# Patient Record
Sex: FEMALE | Race: WHITE | NOT HISPANIC OR LATINO | Employment: UNEMPLOYED | URBAN - METROPOLITAN AREA
[De-identification: names, ages, dates, MRNs, and addresses within clinical notes are randomized per-mention and may not be internally consistent; named-entity substitution may affect disease eponyms.]

---

## 2017-01-03 ENCOUNTER — COMMUNICATION - HEALTHEAST (OUTPATIENT)
Dept: OTOLARYNGOLOGY | Facility: CLINIC | Age: 57
End: 2017-01-03

## 2017-01-04 ENCOUNTER — COMMUNICATION - HEALTHEAST (OUTPATIENT)
Dept: ADMINISTRATIVE | Facility: CLINIC | Age: 57
End: 2017-01-04

## 2017-02-08 ENCOUNTER — OFFICE VISIT - HEALTHEAST (OUTPATIENT)
Dept: INTERNAL MEDICINE | Facility: CLINIC | Age: 57
End: 2017-02-08

## 2017-02-08 DIAGNOSIS — I10 ESSENTIAL HYPERTENSION: ICD-10-CM

## 2017-02-17 ENCOUNTER — COMMUNICATION - HEALTHEAST (OUTPATIENT)
Dept: INTERNAL MEDICINE | Facility: CLINIC | Age: 57
End: 2017-02-17

## 2017-02-17 DIAGNOSIS — I10 HTN (HYPERTENSION): ICD-10-CM

## 2017-03-08 ENCOUNTER — COMMUNICATION - HEALTHEAST (OUTPATIENT)
Dept: SCHEDULING | Facility: CLINIC | Age: 57
End: 2017-03-08

## 2017-03-08 DIAGNOSIS — I10 HTN (HYPERTENSION): ICD-10-CM

## 2017-03-10 ENCOUNTER — AMBULATORY - HEALTHEAST (OUTPATIENT)
Dept: LAB | Facility: CLINIC | Age: 57
End: 2017-03-10

## 2017-03-10 ENCOUNTER — COMMUNICATION - HEALTHEAST (OUTPATIENT)
Dept: INTERNAL MEDICINE | Facility: CLINIC | Age: 57
End: 2017-03-10

## 2017-03-10 DIAGNOSIS — I10 HTN (HYPERTENSION): ICD-10-CM

## 2017-04-19 ENCOUNTER — COMMUNICATION - HEALTHEAST (OUTPATIENT)
Dept: INTERNAL MEDICINE | Facility: CLINIC | Age: 57
End: 2017-04-19

## 2017-04-19 ENCOUNTER — RECORDS - HEALTHEAST (OUTPATIENT)
Dept: ADMINISTRATIVE | Facility: OTHER | Age: 57
End: 2017-04-19

## 2017-04-19 ENCOUNTER — OFFICE VISIT - HEALTHEAST (OUTPATIENT)
Dept: INTERNAL MEDICINE | Facility: CLINIC | Age: 57
End: 2017-04-19

## 2017-04-19 DIAGNOSIS — I10 HTN (HYPERTENSION): ICD-10-CM

## 2017-06-05 ENCOUNTER — AMBULATORY - HEALTHEAST (OUTPATIENT)
Dept: NURSING | Facility: CLINIC | Age: 57
End: 2017-06-05

## 2017-06-12 ENCOUNTER — COMMUNICATION - HEALTHEAST (OUTPATIENT)
Dept: SCHEDULING | Facility: CLINIC | Age: 57
End: 2017-06-12

## 2017-07-11 ENCOUNTER — OFFICE VISIT - HEALTHEAST (OUTPATIENT)
Dept: INTERNAL MEDICINE | Facility: CLINIC | Age: 57
End: 2017-07-11

## 2017-07-11 DIAGNOSIS — I10 ESSENTIAL HYPERTENSION WITH GOAL BLOOD PRESSURE LESS THAN 140/90: ICD-10-CM

## 2017-07-11 DIAGNOSIS — E78.00 PURE HYPERCHOLESTEROLEMIA: ICD-10-CM

## 2017-07-11 DIAGNOSIS — I10 HTN (HYPERTENSION): ICD-10-CM

## 2017-08-01 ENCOUNTER — COMMUNICATION - HEALTHEAST (OUTPATIENT)
Dept: INTERNAL MEDICINE | Facility: CLINIC | Age: 57
End: 2017-08-01

## 2017-10-10 ENCOUNTER — RECORDS - HEALTHEAST (OUTPATIENT)
Dept: ADMINISTRATIVE | Facility: OTHER | Age: 57
End: 2017-10-10

## 2017-11-20 ENCOUNTER — AMBULATORY - HEALTHEAST (OUTPATIENT)
Dept: NURSING | Facility: CLINIC | Age: 57
End: 2017-11-20

## 2018-01-08 ENCOUNTER — TRANSFERRED RECORDS (OUTPATIENT)
Dept: HEALTH INFORMATION MANAGEMENT | Facility: CLINIC | Age: 58
End: 2018-01-08
Payer: COMMERCIAL

## 2018-01-09 ENCOUNTER — RECORDS - HEALTHEAST (OUTPATIENT)
Dept: ADMINISTRATIVE | Facility: OTHER | Age: 58
End: 2018-01-09

## 2018-01-10 ENCOUNTER — OFFICE VISIT - HEALTHEAST (OUTPATIENT)
Dept: INTERNAL MEDICINE | Facility: CLINIC | Age: 58
End: 2018-01-10

## 2018-01-10 DIAGNOSIS — I10 ESSENTIAL HYPERTENSION: ICD-10-CM

## 2018-01-10 DIAGNOSIS — E78.00 PURE HYPERCHOLESTEROLEMIA: ICD-10-CM

## 2018-01-10 DIAGNOSIS — J34.9 SINUS DISEASE: ICD-10-CM

## 2018-01-10 DIAGNOSIS — Z12.11 SCREENING FOR COLON CANCER: ICD-10-CM

## 2018-01-10 DIAGNOSIS — G47.33 OBSTRUCTIVE SLEEP APNEA: ICD-10-CM

## 2018-01-10 LAB
ALBUMIN SERPL-MCNC: 3.9 G/DL (ref 3.5–5)
ALP SERPL-CCNC: 85 U/L (ref 45–120)
ALT SERPL W P-5'-P-CCNC: 16 U/L (ref 0–45)
ANION GAP SERPL CALCULATED.3IONS-SCNC: 11 MMOL/L (ref 5–18)
AST SERPL W P-5'-P-CCNC: 12 U/L (ref 0–40)
BILIRUB SERPL-MCNC: 0.6 MG/DL (ref 0–1)
BUN SERPL-MCNC: 15 MG/DL (ref 8–22)
CALCIUM SERPL-MCNC: 9.9 MG/DL (ref 8.5–10.5)
CHLORIDE BLD-SCNC: 106 MMOL/L (ref 98–107)
CHOLEST SERPL-MCNC: 240 MG/DL
CO2 SERPL-SCNC: 27 MMOL/L (ref 22–31)
CREAT SERPL-MCNC: 0.74 MG/DL (ref 0.6–1.1)
FASTING STATUS PATIENT QL REPORTED: YES
GFR SERPL CREATININE-BSD FRML MDRD: >60 ML/MIN/1.73M2
GLUCOSE BLD-MCNC: 76 MG/DL (ref 70–125)
HDLC SERPL-MCNC: 57 MG/DL
LDLC SERPL CALC-MCNC: 163 MG/DL
POTASSIUM BLD-SCNC: 4.7 MMOL/L (ref 3.5–5)
PROT SERPL-MCNC: 7.9 G/DL (ref 6–8)
SODIUM SERPL-SCNC: 144 MMOL/L (ref 136–145)
TRIGL SERPL-MCNC: 101 MG/DL

## 2018-01-23 ENCOUNTER — COMMUNICATION - HEALTHEAST (OUTPATIENT)
Dept: INTERNAL MEDICINE | Facility: CLINIC | Age: 58
End: 2018-01-23

## 2018-02-02 ENCOUNTER — COMMUNICATION - HEALTHEAST (OUTPATIENT)
Dept: SCHEDULING | Facility: CLINIC | Age: 58
End: 2018-02-02

## 2018-02-06 ENCOUNTER — OFFICE VISIT - HEALTHEAST (OUTPATIENT)
Dept: INTERNAL MEDICINE | Facility: CLINIC | Age: 58
End: 2018-02-06

## 2018-02-06 ENCOUNTER — RECORDS - HEALTHEAST (OUTPATIENT)
Dept: GENERAL RADIOLOGY | Facility: CLINIC | Age: 58
End: 2018-02-06

## 2018-02-06 DIAGNOSIS — R05.9 COUGH: ICD-10-CM

## 2018-02-06 DIAGNOSIS — G47.33 OBSTRUCTIVE SLEEP APNEA: ICD-10-CM

## 2018-02-06 DIAGNOSIS — E78.00 PURE HYPERCHOLESTEROLEMIA: ICD-10-CM

## 2018-02-06 DIAGNOSIS — Z11.59 NEED FOR HEPATITIS C SCREENING TEST: ICD-10-CM

## 2018-02-06 DIAGNOSIS — I10 ESSENTIAL HYPERTENSION: ICD-10-CM

## 2018-02-06 DIAGNOSIS — Z01.810 PREOP CARDIOVASCULAR EXAM: ICD-10-CM

## 2018-02-06 LAB
ANION GAP SERPL CALCULATED.3IONS-SCNC: 9 MMOL/L (ref 5–18)
ATRIAL RATE - MUSE: 77 BPM
BUN SERPL-MCNC: 9 MG/DL (ref 8–22)
CALCIUM SERPL-MCNC: 9.6 MG/DL (ref 8.5–10.5)
CHLORIDE BLD-SCNC: 104 MMOL/L (ref 98–107)
CO2 SERPL-SCNC: 28 MMOL/L (ref 22–31)
CREAT SERPL-MCNC: 0.74 MG/DL (ref 0.6–1.1)
DIASTOLIC BLOOD PRESSURE - MUSE: NORMAL MMHG
ERYTHROCYTE [DISTWIDTH] IN BLOOD BY AUTOMATED COUNT: 12 % (ref 11–14.5)
GFR SERPL CREATININE-BSD FRML MDRD: >60 ML/MIN/1.73M2
GLUCOSE BLD-MCNC: 89 MG/DL (ref 70–125)
HCT VFR BLD AUTO: 41.3 % (ref 35–47)
HGB BLD-MCNC: 13.7 G/DL (ref 12–16)
INR PPP: 1.03 (ref 0.9–1.1)
INTERPRETATION ECG - MUSE: NORMAL
MCH RBC QN AUTO: 28.8 PG (ref 27–34)
MCHC RBC AUTO-ENTMCNC: 33.3 G/DL (ref 32–36)
MCV RBC AUTO: 86 FL (ref 80–100)
P AXIS - MUSE: 25 DEGREES
PLATELET # BLD AUTO: 474 THOU/UL (ref 140–440)
PMV BLD AUTO: 6.8 FL (ref 7–10)
POTASSIUM BLD-SCNC: 4 MMOL/L (ref 3.5–5)
PR INTERVAL - MUSE: 140 MS
QRS DURATION - MUSE: 70 MS
QT - MUSE: 378 MS
QTC - MUSE: 427 MS
R AXIS - MUSE: 45 DEGREES
RBC # BLD AUTO: 4.77 MILL/UL (ref 3.8–5.4)
SODIUM SERPL-SCNC: 141 MMOL/L (ref 136–145)
SYSTOLIC BLOOD PRESSURE - MUSE: NORMAL MMHG
T AXIS - MUSE: 17 DEGREES
VENTRICULAR RATE- MUSE: 77 BPM
WBC: 8.4 THOU/UL (ref 4–11)

## 2018-02-06 ASSESSMENT — MIFFLIN-ST. JEOR: SCORE: 1371.4

## 2018-02-07 ENCOUNTER — RECORDS - HEALTHEAST (OUTPATIENT)
Dept: ADMINISTRATIVE | Facility: OTHER | Age: 58
End: 2018-02-07

## 2018-02-07 LAB
HCV AB SERPL QL IA: NEGATIVE
PAP SMEAR - HIM PATIENT REPORTED: NORMAL

## 2018-02-08 ENCOUNTER — COMMUNICATION - HEALTHEAST (OUTPATIENT)
Dept: SCHEDULING | Facility: CLINIC | Age: 58
End: 2018-02-08

## 2018-02-21 ENCOUNTER — TRANSFERRED RECORDS (OUTPATIENT)
Dept: HEALTH INFORMATION MANAGEMENT | Facility: CLINIC | Age: 58
End: 2018-02-21
Payer: COMMERCIAL

## 2018-03-05 ENCOUNTER — RECORDS - HEALTHEAST (OUTPATIENT)
Dept: ADMINISTRATIVE | Facility: OTHER | Age: 58
End: 2018-03-05

## 2018-04-02 ENCOUNTER — RECORDS - HEALTHEAST (OUTPATIENT)
Dept: ADMINISTRATIVE | Facility: OTHER | Age: 58
End: 2018-04-02

## 2018-05-08 ENCOUNTER — COMMUNICATION - HEALTHEAST (OUTPATIENT)
Dept: INTERNAL MEDICINE | Facility: CLINIC | Age: 58
End: 2018-05-08

## 2018-05-08 DIAGNOSIS — I10 HTN (HYPERTENSION): ICD-10-CM

## 2018-06-13 ENCOUNTER — OFFICE VISIT - HEALTHEAST (OUTPATIENT)
Dept: INTERNAL MEDICINE | Facility: CLINIC | Age: 58
End: 2018-06-13

## 2018-06-13 DIAGNOSIS — I10 ESSENTIAL HYPERTENSION: ICD-10-CM

## 2018-06-13 DIAGNOSIS — L65.9 HAIR LOSS: ICD-10-CM

## 2018-06-13 LAB — TSH SERPL DL<=0.005 MIU/L-ACNC: 1 UIU/ML (ref 0.3–5)

## 2018-06-17 LAB — DHEA SERPL-MCNC: 1.63 NG/ML (ref 0.63–4.7)

## 2018-08-07 ENCOUNTER — COMMUNICATION - HEALTHEAST (OUTPATIENT)
Dept: INTERNAL MEDICINE | Facility: CLINIC | Age: 58
End: 2018-08-07

## 2018-08-07 DIAGNOSIS — I10 ESSENTIAL HYPERTENSION WITH GOAL BLOOD PRESSURE LESS THAN 140/90: ICD-10-CM

## 2018-10-31 ENCOUNTER — RECORDS - HEALTHEAST (OUTPATIENT)
Dept: ADMINISTRATIVE | Facility: OTHER | Age: 58
End: 2018-10-31

## 2018-11-05 ENCOUNTER — AMBULATORY - HEALTHEAST (OUTPATIENT)
Dept: NURSING | Facility: CLINIC | Age: 58
End: 2018-11-05

## 2019-02-02 ENCOUNTER — COMMUNICATION - HEALTHEAST (OUTPATIENT)
Dept: INTERNAL MEDICINE | Facility: CLINIC | Age: 59
End: 2019-02-02

## 2019-02-02 DIAGNOSIS — I10 HTN (HYPERTENSION): ICD-10-CM

## 2019-02-26 ENCOUNTER — AMBULATORY - HEALTHEAST (OUTPATIENT)
Dept: INTERNAL MEDICINE | Facility: CLINIC | Age: 59
End: 2019-02-26

## 2019-02-26 ENCOUNTER — OFFICE VISIT - HEALTHEAST (OUTPATIENT)
Dept: INTERNAL MEDICINE | Facility: CLINIC | Age: 59
End: 2019-02-26

## 2019-02-26 DIAGNOSIS — E78.00 PURE HYPERCHOLESTEROLEMIA: ICD-10-CM

## 2019-02-26 DIAGNOSIS — I10 HTN (HYPERTENSION): ICD-10-CM

## 2019-02-26 DIAGNOSIS — I10 ESSENTIAL HYPERTENSION WITH GOAL BLOOD PRESSURE LESS THAN 140/90: ICD-10-CM

## 2019-02-26 LAB
ERYTHROCYTE [DISTWIDTH] IN BLOOD BY AUTOMATED COUNT: 12.8 % (ref 11–14.5)
HCT VFR BLD AUTO: 40.2 % (ref 35–47)
HGB BLD-MCNC: 13.7 G/DL (ref 12–16)
MCH RBC QN AUTO: 29.3 PG (ref 27–34)
MCHC RBC AUTO-ENTMCNC: 34 G/DL (ref 32–36)
MCV RBC AUTO: 86 FL (ref 80–100)
PLATELET # BLD AUTO: 290 THOU/UL (ref 140–440)
PMV BLD AUTO: 7.2 FL (ref 7–10)
RBC # BLD AUTO: 4.66 MILL/UL (ref 3.8–5.4)
WBC: 7 THOU/UL (ref 4–11)

## 2019-02-27 LAB
ALBUMIN SERPL-MCNC: 4.1 G/DL (ref 3.5–5)
ALP SERPL-CCNC: 88 U/L (ref 45–120)
ALT SERPL W P-5'-P-CCNC: 14 U/L (ref 0–45)
ANION GAP SERPL CALCULATED.3IONS-SCNC: 14 MMOL/L (ref 5–18)
AST SERPL W P-5'-P-CCNC: 15 U/L (ref 0–40)
BILIRUB SERPL-MCNC: 0.5 MG/DL (ref 0–1)
BUN SERPL-MCNC: 11 MG/DL (ref 8–22)
CALCIUM SERPL-MCNC: 9.8 MG/DL (ref 8.5–10.5)
CHLORIDE BLD-SCNC: 105 MMOL/L (ref 98–107)
CHOLEST SERPL-MCNC: 234 MG/DL
CO2 SERPL-SCNC: 23 MMOL/L (ref 22–31)
CREAT SERPL-MCNC: 0.74 MG/DL (ref 0.6–1.1)
FASTING STATUS PATIENT QL REPORTED: YES
GFR SERPL CREATININE-BSD FRML MDRD: >60 ML/MIN/1.73M2
GLUCOSE BLD-MCNC: 90 MG/DL (ref 70–125)
HDLC SERPL-MCNC: 61 MG/DL
LDLC SERPL CALC-MCNC: 149 MG/DL
POTASSIUM BLD-SCNC: 4 MMOL/L (ref 3.5–5)
PROT SERPL-MCNC: 7.5 G/DL (ref 6–8)
SODIUM SERPL-SCNC: 142 MMOL/L (ref 136–145)
TRIGL SERPL-MCNC: 121 MG/DL
TSH SERPL DL<=0.005 MIU/L-ACNC: 1.49 UIU/ML (ref 0.3–5)

## 2019-03-19 ENCOUNTER — RECORDS - HEALTHEAST (OUTPATIENT)
Dept: ADMINISTRATIVE | Facility: OTHER | Age: 59
End: 2019-03-19

## 2019-04-09 ENCOUNTER — TRANSFERRED RECORDS (OUTPATIENT)
Dept: HEALTH INFORMATION MANAGEMENT | Facility: CLINIC | Age: 59
End: 2019-04-09
Payer: COMMERCIAL

## 2019-04-09 ENCOUNTER — RECORDS - HEALTHEAST (OUTPATIENT)
Dept: ADMINISTRATIVE | Facility: OTHER | Age: 59
End: 2019-04-09

## 2019-04-09 LAB
HPV_EXT - HISTORICAL: NORMAL
PAP SMEAR - HIM PATIENT REPORTED: NORMAL

## 2019-06-10 ENCOUNTER — COMMUNICATION - HEALTHEAST (OUTPATIENT)
Dept: INTERNAL MEDICINE | Facility: CLINIC | Age: 59
End: 2019-06-10

## 2019-06-10 DIAGNOSIS — R05.9 COUGH: ICD-10-CM

## 2019-06-10 DIAGNOSIS — J34.9 SINUS DISEASE: ICD-10-CM

## 2019-06-11 ENCOUNTER — RECORDS - HEALTHEAST (OUTPATIENT)
Dept: ADMINISTRATIVE | Facility: OTHER | Age: 59
End: 2019-06-11

## 2019-06-24 ENCOUNTER — RECORDS - HEALTHEAST (OUTPATIENT)
Dept: HEALTH INFORMATION MANAGEMENT | Facility: CLINIC | Age: 59
End: 2019-06-24

## 2019-07-16 ENCOUNTER — OFFICE VISIT - HEALTHEAST (OUTPATIENT)
Dept: INTERNAL MEDICINE | Facility: CLINIC | Age: 59
End: 2019-07-16

## 2019-07-16 DIAGNOSIS — G47.33 OBSTRUCTIVE SLEEP APNEA: ICD-10-CM

## 2019-07-16 DIAGNOSIS — I10 ESSENTIAL HYPERTENSION WITH GOAL BLOOD PRESSURE LESS THAN 140/90: ICD-10-CM

## 2019-07-16 DIAGNOSIS — E78.00 PURE HYPERCHOLESTEROLEMIA: ICD-10-CM
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
## 2019-07-16 DIAGNOSIS — R05.3 CHRONIC COUGH: ICD-10-CM

## 2019-07-16 DIAGNOSIS — S30.861A TICK BITE OF ABDOMEN, INITIAL ENCOUNTER: ICD-10-CM

## 2019-07-16 ASSESSMENT — MIFFLIN-ST. JEOR: SCORE: 1434.05

## 2019-07-17 ENCOUNTER — COMMUNICATION - HEALTHEAST (OUTPATIENT)
Dept: INTERNAL MEDICINE | Facility: CLINIC | Age: 59
End: 2019-07-17

## 2019-07-17 LAB — B BURGDOR IGG+IGM SER QL: 0.09 INDEX VALUE

## 2019-07-29 ENCOUNTER — COMMUNICATION - HEALTHEAST (OUTPATIENT)
Dept: INTERNAL MEDICINE | Facility: CLINIC | Age: 59
End: 2019-07-29

## 2019-07-29 DIAGNOSIS — I10 HTN (HYPERTENSION): ICD-10-CM

## 2019-07-30 ENCOUNTER — HOSPITAL ENCOUNTER (OUTPATIENT)
Dept: CT IMAGING | Facility: CLINIC | Age: 59
Discharge: HOME | End: 2019-07-30
Attending: INTERNAL MEDICINE

## 2019-07-30 ENCOUNTER — HOSPITAL ENCOUNTER (OUTPATIENT)
Dept: RESPIRATORY THERAPY | Facility: CLINIC | Age: 59
Discharge: HOME | End: 2019-07-30
Attending: INTERNAL MEDICINE

## 2019-07-30 DIAGNOSIS — R05.3 CHRONIC COUGH: ICD-10-CM

## 2019-07-30 LAB — HGB BLD-MCNC: 14 G/DL (ref 12–16)

## 2019-08-01 ENCOUNTER — COMMUNICATION - HEALTHEAST (OUTPATIENT)
Dept: INTERNAL MEDICINE | Facility: CLINIC | Age: 59
End: 2019-08-01

## 2019-08-27 ENCOUNTER — MEDICAL CORRESPONDENCE (OUTPATIENT)
Dept: HEALTH INFORMATION MANAGEMENT | Facility: CLINIC | Age: 59
End: 2019-08-27

## 2019-08-27 ENCOUNTER — COMMUNICATION - HEALTHEAST (OUTPATIENT)
Dept: INTERNAL MEDICINE | Facility: CLINIC | Age: 59
End: 2019-08-27

## 2019-08-27 ENCOUNTER — OFFICE VISIT - HEALTHEAST (OUTPATIENT)
Dept: INTERNAL MEDICINE | Facility: CLINIC | Age: 59
End: 2019-08-27

## 2019-08-27 DIAGNOSIS — J30.89 NON-SEASONAL ALLERGIC RHINITIS DUE TO OTHER ALLERGIC TRIGGER: ICD-10-CM

## 2019-08-27 DIAGNOSIS — I25.10 ASYMPTOMATIC ARTERIOSCLEROSIS OF CORONARY ARTERY: ICD-10-CM

## 2019-08-27 DIAGNOSIS — I10 ESSENTIAL HYPERTENSION: ICD-10-CM

## 2019-08-27 DIAGNOSIS — J47.9 BRONCHIECTASIS WITHOUT ACUTE EXACERBATION (H): ICD-10-CM

## 2019-08-27 DIAGNOSIS — J34.9 SINUS DISEASE: ICD-10-CM

## 2019-08-27 DIAGNOSIS — R05.3 CHRONIC COUGH: ICD-10-CM

## 2019-08-27 ASSESSMENT — MIFFLIN-ST. JEOR: SCORE: 1447.66

## 2019-08-29 ENCOUNTER — COMMUNICATION - HEALTHEAST (OUTPATIENT)
Dept: INTERNAL MEDICINE | Facility: CLINIC | Age: 59
End: 2019-08-29

## 2019-08-29 DIAGNOSIS — J34.9 SINUS DISEASE: ICD-10-CM

## 2019-09-20 ENCOUNTER — OFFICE VISIT - HEALTHEAST (OUTPATIENT)
Dept: PULMONOLOGY | Facility: OTHER | Age: 59
End: 2019-09-20

## 2019-09-20 DIAGNOSIS — T78.40XA ALLERGIC STATE, INITIAL ENCOUNTER: ICD-10-CM

## 2019-09-20 DIAGNOSIS — R05.3 CHRONIC COUGH: ICD-10-CM

## 2019-09-20 DIAGNOSIS — J47.9 BRONCHIECTASIS WITHOUT ACUTE EXACERBATION (H): ICD-10-CM

## 2019-09-20 DIAGNOSIS — J45.30 MILD PERSISTENT ASTHMA WITHOUT COMPLICATION: ICD-10-CM

## 2019-09-20 ASSESSMENT — MIFFLIN-ST. JEOR: SCORE: 1431.78

## 2019-10-07 ENCOUNTER — OFFICE VISIT - HEALTHEAST (OUTPATIENT)
Dept: ALLERGY | Facility: CLINIC | Age: 59
End: 2019-10-07

## 2019-10-07 DIAGNOSIS — J30.1 SEASONAL ALLERGIC RHINITIS DUE TO POLLEN: ICD-10-CM

## 2019-10-07 DIAGNOSIS — J45.30 MILD PERSISTENT ASTHMA WITHOUT COMPLICATION: ICD-10-CM

## 2019-10-07 ASSESSMENT — MIFFLIN-ST. JEOR: SCORE: 1452.65

## 2019-10-29 ENCOUNTER — COMMUNICATION - HEALTHEAST (OUTPATIENT)
Dept: INTERNAL MEDICINE | Facility: CLINIC | Age: 59
End: 2019-10-29

## 2019-10-29 DIAGNOSIS — I25.10 CORONARY ATHEROSCLEROSIS OF NATIVE CORONARY ARTERY: Primary | ICD-10-CM

## 2019-10-29 PROCEDURE — 36415 COLL VENOUS BLD VENIPUNCTURE: CPT | Performed by: INTERNAL MEDICINE

## 2019-10-29 PROCEDURE — 80061 LIPID PANEL: CPT | Performed by: INTERNAL MEDICINE

## 2019-10-29 PROCEDURE — 80076 HEPATIC FUNCTION PANEL: CPT | Performed by: INTERNAL MEDICINE

## 2019-10-30 LAB
ALBUMIN SERPL-MCNC: 3.7 G/DL (ref 3.4–5)
ALP SERPL-CCNC: 90 U/L (ref 40–150)
ALT SERPL W P-5'-P-CCNC: 23 U/L (ref 0–50)
AST SERPL W P-5'-P-CCNC: 11 U/L (ref 0–45)
BILIRUB DIRECT SERPL-MCNC: 0.1 MG/DL (ref 0–0.2)
BILIRUB SERPL-MCNC: 0.6 MG/DL (ref 0.2–1.3)
CHOLEST SERPL-MCNC: 128 MG/DL
HDLC SERPL-MCNC: 49 MG/DL
LDLC SERPL CALC-MCNC: 55 MG/DL
NONHDLC SERPL-MCNC: 79 MG/DL
PROT SERPL-MCNC: 7.8 G/DL (ref 6.8–8.8)
TRIGL SERPL-MCNC: 122 MG/DL

## 2019-11-20 ENCOUNTER — COMMUNICATION - HEALTHEAST (OUTPATIENT)
Dept: INTERNAL MEDICINE | Facility: CLINIC | Age: 59
End: 2019-11-20

## 2019-11-20 DIAGNOSIS — I25.10 ASYMPTOMATIC ARTERIOSCLEROSIS OF CORONARY ARTERY: ICD-10-CM

## 2019-11-21 ENCOUNTER — RECORDS - HEALTHEAST (OUTPATIENT)
Dept: ADMINISTRATIVE | Facility: OTHER | Age: 59
End: 2019-11-21

## 2019-12-10 ENCOUNTER — OFFICE VISIT - HEALTHEAST (OUTPATIENT)
Dept: INTERNAL MEDICINE | Facility: CLINIC | Age: 59
End: 2019-12-10

## 2019-12-10 DIAGNOSIS — I25.10 ASYMPTOMATIC ARTERIOSCLEROSIS OF CORONARY ARTERY: ICD-10-CM

## 2019-12-10 DIAGNOSIS — C44.310 BCC (BASAL CELL CARCINOMA), FACE: ICD-10-CM

## 2019-12-10 DIAGNOSIS — J47.9 BRONCHIECTASIS WITHOUT ACUTE EXACERBATION (H): ICD-10-CM

## 2019-12-10 DIAGNOSIS — Z00.00 ROUTINE GENERAL MEDICAL EXAMINATION AT A HEALTH CARE FACILITY: ICD-10-CM

## 2019-12-10 DIAGNOSIS — J30.89 NON-SEASONAL ALLERGIC RHINITIS DUE TO OTHER ALLERGIC TRIGGER: ICD-10-CM

## 2019-12-10 DIAGNOSIS — G47.33 OBSTRUCTIVE SLEEP APNEA: ICD-10-CM

## 2019-12-10 DIAGNOSIS — I10 ESSENTIAL HYPERTENSION: ICD-10-CM

## 2019-12-10 LAB
ALBUMIN SERPL-MCNC: 4.1 G/DL (ref 3.5–5)
ALBUMIN UR-MCNC: NEGATIVE MG/DL
ALP SERPL-CCNC: 85 U/L (ref 45–120)
ALT SERPL W P-5'-P-CCNC: 22 U/L (ref 0–45)
ANION GAP SERPL CALCULATED.3IONS-SCNC: 11 MMOL/L (ref 5–18)
APPEARANCE UR: CLEAR
AST SERPL W P-5'-P-CCNC: 17 U/L (ref 0–40)
BACTERIA #/AREA URNS HPF: ABNORMAL HPF
BILIRUB SERPL-MCNC: 0.7 MG/DL (ref 0–1)
BILIRUB UR QL STRIP: NEGATIVE
BUN SERPL-MCNC: 9 MG/DL (ref 8–22)
CALCIUM SERPL-MCNC: 9.7 MG/DL (ref 8.5–10.5)
CHLORIDE BLD-SCNC: 106 MMOL/L (ref 98–107)
CHOLEST SERPL-MCNC: 148 MG/DL
CO2 SERPL-SCNC: 25 MMOL/L (ref 22–31)
COLOR UR AUTO: YELLOW
CREAT SERPL-MCNC: 0.71 MG/DL (ref 0.6–1.1)
ERYTHROCYTE [DISTWIDTH] IN BLOOD BY AUTOMATED COUNT: 12.6 % (ref 11–14.5)
FASTING STATUS PATIENT QL REPORTED: YES
GFR SERPL CREATININE-BSD FRML MDRD: >60 ML/MIN/1.73M2
GLUCOSE BLD-MCNC: 87 MG/DL (ref 70–125)
GLUCOSE UR STRIP-MCNC: NEGATIVE MG/DL
HCT VFR BLD AUTO: 40.5 % (ref 35–47)
HDLC SERPL-MCNC: 54 MG/DL
HGB BLD-MCNC: 13.7 G/DL (ref 12–16)
HGB UR QL STRIP: ABNORMAL
KETONES UR STRIP-MCNC: NEGATIVE MG/DL
LDLC SERPL CALC-MCNC: 72 MG/DL
LEUKOCYTE ESTERASE UR QL STRIP: NEGATIVE
MCH RBC QN AUTO: 29.6 PG (ref 27–34)
MCHC RBC AUTO-ENTMCNC: 34 G/DL (ref 32–36)
MCV RBC AUTO: 87 FL (ref 80–100)
NITRATE UR QL: NEGATIVE
PH UR STRIP: 6.5 [PH] (ref 5–8)
PLATELET # BLD AUTO: 286 THOU/UL (ref 140–440)
PMV BLD AUTO: 7.7 FL (ref 7–10)
POTASSIUM BLD-SCNC: 3.8 MMOL/L (ref 3.5–5)
PROT SERPL-MCNC: 8 G/DL (ref 6–8)
RBC # BLD AUTO: 4.65 MILL/UL (ref 3.8–5.4)
RBC #/AREA URNS AUTO: ABNORMAL HPF
SODIUM SERPL-SCNC: 142 MMOL/L (ref 136–145)
SP GR UR STRIP: 1.01 (ref 1–1.03)
SQUAMOUS #/AREA URNS AUTO: ABNORMAL LPF
TRIGL SERPL-MCNC: 111 MG/DL
TSH SERPL DL<=0.005 MIU/L-ACNC: 1.13 UIU/ML (ref 0.3–5)
UROBILINOGEN UR STRIP-ACNC: ABNORMAL
WBC #/AREA URNS AUTO: ABNORMAL HPF
WBC: 8.5 THOU/UL (ref 4–11)

## 2019-12-10 ASSESSMENT — MIFFLIN-ST. JEOR: SCORE: 1429.51

## 2019-12-12 ENCOUNTER — COMMUNICATION - HEALTHEAST (OUTPATIENT)
Dept: INTERNAL MEDICINE | Facility: CLINIC | Age: 59
End: 2019-12-12

## 2020-01-11 ENCOUNTER — COMMUNICATION - HEALTHEAST (OUTPATIENT)
Dept: INTERNAL MEDICINE | Facility: CLINIC | Age: 60
End: 2020-01-11

## 2020-01-11 DIAGNOSIS — I10 ESSENTIAL HYPERTENSION WITH GOAL BLOOD PRESSURE LESS THAN 140/90: ICD-10-CM

## 2020-02-03 ENCOUNTER — OFFICE VISIT (OUTPATIENT)
Dept: URGENT CARE | Facility: URGENT CARE | Age: 60
End: 2020-02-03
Payer: COMMERCIAL

## 2020-02-03 VITALS
TEMPERATURE: 98.1 F | WEIGHT: 197.1 LBS | OXYGEN SATURATION: 96 % | HEART RATE: 66 BPM | DIASTOLIC BLOOD PRESSURE: 86 MMHG | SYSTOLIC BLOOD PRESSURE: 138 MMHG
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
## 2020-02-03 DIAGNOSIS — S09.90XA CLOSED HEAD INJURY, INITIAL ENCOUNTER: Primary | ICD-10-CM

## 2020-02-03 PROCEDURE — 99204 OFFICE O/P NEW MOD 45 MIN: CPT | Performed by: FAMILY MEDICINE

## 2020-02-03 RX ORDER — DEXAMETHASONE 4 MG/1
TABLET ORAL
COMMUNITY
Start: 2019-12-13 | End: 2022-03-03

## 2020-02-03 RX ORDER — ROSUVASTATIN CALCIUM 10 MG/1
TABLET, COATED ORAL
COMMUNITY
Start: 2019-11-21 | End: 2021-10-06

## 2020-02-03 RX ORDER — AMLODIPINE BESYLATE 5 MG/1
TABLET ORAL
COMMUNITY
Start: 2020-01-14 | End: 2021-10-06

## 2020-02-03 RX ORDER — CLINDAMYCIN HCL 300 MG
CAPSULE ORAL
Refills: 1 | COMMUNITY
Start: 2019-10-02 | End: 2024-06-17

## 2020-02-03 RX ORDER — IRBESARTAN 300 MG/1
TABLET ORAL
COMMUNITY
Start: 2020-01-14 | End: 2021-10-06

## 2020-02-03 ASSESSMENT — ENCOUNTER SYMPTOMS
DECREASED CONCENTRATION: 0
WEAKNESS: 0
AGITATION: 0
FATIGUE: 0
BACK PAIN: 0
HEADACHES: 1
TROUBLE SWALLOWING: 0
BRUISES/BLEEDS EASILY: 0
SPEECH DIFFICULTY: 0
VOMITING: 0
NECK STIFFNESS: 0
DIZZINESS: 0
NAUSEA: 1
NUMBNESS: 0
RESPIRATORY NEGATIVE: 1

## 2020-02-03 NOTE — PROGRESS NOTES
"SUBJECTIVE:   Myriam Atkins is a 59 year old female presenting with a chief complaint of   Chief Complaint   Patient presents with     Urgent Care     Headache     Pt fell outside on ice and hit back of head on ice. Pt says she is a little nauseated and slightly dizzy. Pain \"changes\" and no specific pain number.        She is a new patient of [facility].    Head Injury  Onset of symptoms was this morning.  Mechanism of Injury: fell on [employer] driveway this morning around 9:30  Loss of consciousness: No LOC.    Course of illness is improving.  Pain in back of head and swelling have gone down a fair amount.  The head injury did not results in any open wound.   Current and Associated symptoms: Currently has mild headache posteriorly without radiation.  She also feels somewhat nauseated \"like motion sickness\" but she thinks this may be because she hasn't eaten anything yet this morning.  Treatment measures tried include: none.    Review of Systems   Constitutional: Negative for fatigue.   HENT: Negative for ear discharge, hearing loss and trouble swallowing.    Eyes: Negative for visual disturbance.   Respiratory: Negative.    Cardiovascular: Negative for chest pain.   Gastrointestinal: Positive for nausea. Negative for vomiting.   Musculoskeletal: Negative for back pain and neck stiffness.   Skin: Negative for rash.   Neurological: Positive for headaches. Negative for dizziness, speech difficulty, weakness and numbness.   Hematological: Does not bruise/bleed easily.   Psychiatric/Behavioral: Negative for agitation and decreased concentration.       No past medical history on file.   HTN  No personal history of any clotting issues.    No family history on file.   Dad has a platelet issue, but this developed in old age.  Mom had blood clots after surgery.    Current Outpatient Medications   Medication Sig Dispense Refill     amLODIPine (NORVASC) 5 MG tablet TAKE 1 TABLET(5 MG) BY MOUTH DAILY       cholecalciferol (VITAMIN " D-1000 MAX ST) 25 MCG (1000 UT) TABS Take 2,000 Units by mouth       clindamycin (CLEOCIN) 300 MG capsule TK 2  CS PO 1 H BEFORE DENTAL APPOINTMENT  1     FLOVENT  MCG/ACT inhaler INHALE 2 PUFFS PO D AT 8 AM       irbesartan (AVAPRO) 300 MG tablet TAKE 1 TABLET(300 MG) BY MOUTH DAILY       rosuvastatin (CRESTOR) 10 MG tablet TAKE 1 TABLET(10 MG) BY MOUTH AT BEDTIME       Social History     Tobacco Use     Smoking status: Never Smoker     Smokeless tobacco: Never Used   Substance Use Topics     Alcohol use: Not on file       OBJECTIVE  /86   Pulse 66   Temp 98.1  F (36.7  C) (Tympanic)   Wt 89.4 kg (197 lb 1.6 oz)   SpO2 96%     Physical Exam  Constitutional:       General: She is not in acute distress.     Appearance: Normal appearance. She is not ill-appearing.   HENT:      Head: Normocephalic. Contusion present. No Villatoro's sign or laceration.      Jaw: No pain on movement.        Comments: Only very minimal swelling in the area of the contusion.     Right Ear: Tympanic membrane, ear canal and external ear normal.      Left Ear: Tympanic membrane, ear canal and external ear normal.      Nose: No rhinorrhea.      Mouth/Throat:      Mouth: Mucous membranes are moist.      Pharynx: No oropharyngeal exudate or posterior oropharyngeal erythema.   Eyes:      General: No scleral icterus.        Right eye: No discharge.         Left eye: No discharge.      Extraocular Movements: Extraocular movements intact.      Conjunctiva/sclera: Conjunctivae normal.      Pupils: Pupils are equal, round, and reactive to light.   Neck:      Musculoskeletal: Normal range of motion and neck supple. No muscular tenderness.   Cardiovascular:      Rate and Rhythm: Normal rate and regular rhythm.      Heart sounds: Normal heart sounds. No murmur.   Pulmonary:      Effort: Pulmonary effort is normal.      Breath sounds: Normal breath sounds.   Musculoskeletal:         General: No deformity.   Skin:     General: Skin is warm.       Capillary Refill: Capillary refill takes less than 2 seconds.   Neurological:      General: No focal deficit present.      Mental Status: She is alert.      Cranial Nerves: No cranial nerve deficit.      Motor: No weakness.      Coordination: Coordination normal.      Gait: Gait normal.      Deep Tendon Reflexes: Reflexes normal.   Psychiatric:         Mood and Affect: Mood normal.         Behavior: Behavior normal.         Thought Content: Thought content normal.         Judgment: Judgment normal.         ASSESSMENT:      ICD-10-CM    1. Closed head injury, initial encounter S09.90XA    2. Fall due to slipping on ice or snow, initial encounter W00.9XXA         Medical Decision Making:  Based on the [place] [facility] CT guidelines, imaging is not indicated at this time.  No evidence of skull fracture or other serious trauma on exam at this time.  Possible very mild concussion, but current symptoms are quite minimal.    PLAN:  Continue to monitor symptoms and physical signs.  Use acetaminophen to help with soreness.  Ice to back of head.  Discussed that neck and back may feel more sore tomorrow and that it's best to try to stay ahead of the pain with OTC medications.    Followup:  Reviewed warning signs to watch for and what should prompt follow up in ER.  Provided patient with written information about head injuries and signs to watch for.

## 2020-02-03 NOTE — PATIENT INSTRUCTIONS
Tylenol up to 650 mg every 6 hours today and tomorrow.    Watch for warning signs as discussed.      Patient Education     * Head Injury, no wake-up (Adult)    You have a head injury. It doesn t look serious right now. But symptoms of a more serious problem may appear later. This could be a mild brain injury (concussion), or bruising or bleeding in the brain. You or someone caring for you will need to watch for the symptoms listed below for at least the next 24 hours. When you get home, be sure to follow any care instructions you re given.  Home care  Watch for the following symptoms  Call 9-1-1 if you have any of these symptoms over the next hours or days:  1. Severe headache or headache that gets worse  2. Nausea and repeated throwing up (vomiting)  3. Dizziness or changes in eyesight (vision changes)  4. Bothered by bright light or loud noise  5. Sleep changes (trouble falling asleep or unusually sleepy or groggy)  6. Changes in the way you act or talk (personality or speech changes)  7. Feeling confused or forgetting things (memory loss)  8. Trouble walking or clumsiness  9. Passing out or fainting (even for a short time)  10. Won t wake up  11. Stiff neck  12. Weakness or numbness in any part of the body  13. Seizures  Do you have signs of a concussion?  A concussion is an injury to the brain caused by shaking. If you were knocked out, that s a sign you may have a concussion. But watch for these signs, too:    Upset stomach (nausea)    Throwing up (vomiting)    Feeling dizzy or confused    Headache    Loss of memory  If you have any of the above signs:    Don t return to sports or any activity where you might hurt your head again.    Wait until all symptoms have been gone for 2 full weeks, and your doctor has cleared you to return to sports.  You could get a serious brain injury if you get hurt again before fully recovering.  General care    You don t need to stay awake or be woken during the night.    For  pain, you may use:  ? Tylenol (acetaminophen) 650 to 1000 mg every 6 hours OR  ? Motrin or Advil (ibuprofen) 600 mg every 6 to 8 hours with food  NOTE: If you have chronic liver or kidney disease or ever had a stomach ulcer or GI bleeding, talk with your doctor before using these medicines.    Don t take aspirin after a head injury.    For swelling and to help with pain: Put a cold source to the injured area for up to 20 minutes at a time. Do this as often as directed. Use a cold pack or bag of ice wrapped in a thin towel. Never put a cold source directly on the skin.    For cuts and scrapes: Care for them as the doctor or nurse directed.    For the next 24 hours (or longer, if your doctor advises it):  ? Don t drink alcohol, use sedatives, or use other medicines that make you sleepy.  ? Don t drive or use large machines.  ? Don t do anything tiring, such as heavy lifting or straining.  ? Limit tasks where you need to focus or concentrate. This includes reading, using a smartphone or computer, watching TV and playing video games.  ? Don t return to sports or other activities that could cause another head injury.  Follow-up care  Follow up with your doctor if symptoms don t get better after 24 hours, or as directed.  When to call the doctor  Call your doctor right away if any of these occur:    Pain doesn t get better or gets worse    New or increased swelling or bruising    Fever of 100.4 F (38 C) or higher, or as directed by your doctor    Increased redness, warmth, draining or bleeding from the injury    Fluid drainage or bleeding from the nose or ears    Any pushed-in spot or bony bump in the injured area  Date Last Reviewed: 9/26/2015  Modifications clinically reviewed by Bert Pan DO, MBA, LUNA, Director of Physician Informatics for Emergency Medicine, [facility] on 8/27/18.    2602-6904 [employer]. [address] [place] [address], [place] [address]. All rights reserved. This  information is not intended as a substitute for professional medical care. Always follow your healthcare professional's instructions. This information has been modified by your health care provider with permission from the publisher.

## 2020-02-09 ENCOUNTER — COMMUNICATION - HEALTHEAST (OUTPATIENT)
Dept: INTERNAL MEDICINE | Facility: CLINIC | Age: 60
End: 2020-02-09

## 2020-02-09 DIAGNOSIS — J47.9 BRONCHIECTASIS WITHOUT ACUTE EXACERBATION (H): ICD-10-CM

## 2020-06-26 ENCOUNTER — COMMUNICATION - HEALTHEAST (OUTPATIENT)
Dept: INTERNAL MEDICINE | Facility: CLINIC | Age: 60
End: 2020-06-26

## 2020-08-13 ENCOUNTER — OFFICE VISIT - HEALTHEAST (OUTPATIENT)
Dept: INTERNAL MEDICINE | Facility: CLINIC | Age: 60
End: 2020-08-13

## 2020-08-13 DIAGNOSIS — G47.33 OBSTRUCTIVE SLEEP APNEA: ICD-10-CM

## 2020-08-13 DIAGNOSIS — J47.9 BRONCHIECTASIS WITHOUT ACUTE EXACERBATION (H): ICD-10-CM

## 2020-08-13 DIAGNOSIS — I25.10 ASYMPTOMATIC ARTERIOSCLEROSIS OF CORONARY ARTERY: ICD-10-CM

## 2020-08-13 DIAGNOSIS — I10 ESSENTIAL HYPERTENSION: ICD-10-CM

## 2020-08-13 LAB
ALBUMIN SERPL-MCNC: 4.2 G/DL (ref 3.5–5)
ALP SERPL-CCNC: 87 U/L (ref 45–120)
ALT SERPL W P-5'-P-CCNC: 18 U/L (ref 0–45)
ANION GAP SERPL CALCULATED.3IONS-SCNC: 10 MMOL/L (ref 5–18)
AST SERPL W P-5'-P-CCNC: 16 U/L (ref 0–40)
BILIRUB SERPL-MCNC: 0.8 MG/DL (ref 0–1)
BUN SERPL-MCNC: 8 MG/DL (ref 8–22)
CALCIUM SERPL-MCNC: 9.8 MG/DL (ref 8.5–10.5)
CHLORIDE BLD-SCNC: 106 MMOL/L (ref 98–107)
CHOLEST SERPL-MCNC: 150 MG/DL
CO2 SERPL-SCNC: 25 MMOL/L (ref 22–31)
CREAT SERPL-MCNC: 0.72 MG/DL (ref 0.6–1.1)
FASTING STATUS PATIENT QL REPORTED: YES
GFR SERPL CREATININE-BSD FRML MDRD: >60 ML/MIN/1.73M2
GLUCOSE BLD-MCNC: 96 MG/DL (ref 70–125)
HDLC SERPL-MCNC: 52 MG/DL
LDLC SERPL CALC-MCNC: 72 MG/DL
POTASSIUM BLD-SCNC: 4.3 MMOL/L (ref 3.5–5)
PROT SERPL-MCNC: 8.1 G/DL (ref 6–8)
SODIUM SERPL-SCNC: 141 MMOL/L (ref 136–145)
TRIGL SERPL-MCNC: 131 MG/DL

## 2020-08-18 ENCOUNTER — COMMUNICATION - HEALTHEAST (OUTPATIENT)
Dept: INTERNAL MEDICINE | Facility: CLINIC | Age: 60
End: 2020-08-18

## 2020-08-18 ENCOUNTER — AMBULATORY - HEALTHEAST (OUTPATIENT)
Dept: INTERNAL MEDICINE | Facility: CLINIC | Age: 60
End: 2020-08-18

## 2020-08-18 DIAGNOSIS — E88.09 HYPERPROTEINEMIA: ICD-10-CM

## 2020-08-25 ENCOUNTER — COMMUNICATION - HEALTHEAST (OUTPATIENT)
Dept: INTERNAL MEDICINE | Facility: CLINIC | Age: 60
End: 2020-08-25

## 2020-08-25 DIAGNOSIS — J34.9 SINUS DISEASE: ICD-10-CM

## 2020-08-26 ENCOUNTER — AMBULATORY - HEALTHEAST (OUTPATIENT)
Dept: LAB | Facility: CLINIC | Age: 60
End: 2020-08-26

## 2020-08-26 DIAGNOSIS — E88.09 HYPERPROTEINEMIA: ICD-10-CM

## 2020-08-28 LAB
ALBUMIN PERCENT: 60.9 % (ref 51–67)
ALBUMIN SERPL ELPH-MCNC: 4.6 G/DL (ref 3.2–4.7)
ALPHA 1 PERCENT: 2.2 % (ref 2–4)
ALPHA 2 PERCENT: 9.5 % (ref 5–13)
ALPHA1 GLOB SERPL ELPH-MCNC: 0.2 G/DL (ref 0.1–0.3)
ALPHA2 GLOB SERPL ELPH-MCNC: 0.7 G/DL (ref 0.4–0.9)
B-GLOBULIN SERPL ELPH-MCNC: 0.8 G/DL (ref 0.7–1.2)
BETA PERCENT: 11 % (ref 10–17)
GAMMA GLOB SERPL ELPH-MCNC: 1.2 G/DL (ref 0.6–1.4)
GAMMA GLOBULIN PERCENT: 16.4 % (ref 9–20)
PATH ICD:: NORMAL
PROT PATTERN SERPL ELPH-IMP: NORMAL
PROT SERPL-MCNC: 7.6 G/DL (ref 6–8)
REVIEWING PATHOLOGIST: NORMAL

## 2020-08-31 ENCOUNTER — COMMUNICATION - HEALTHEAST (OUTPATIENT)
Dept: INTERNAL MEDICINE | Facility: CLINIC | Age: 60
End: 2020-08-31

## 2020-12-27 ENCOUNTER — COMMUNICATION - HEALTHEAST (OUTPATIENT)
Dept: INTERNAL MEDICINE | Facility: CLINIC | Age: 60
End: 2020-12-27

## 2020-12-27 DIAGNOSIS — I25.10 ASYMPTOMATIC ARTERIOSCLEROSIS OF CORONARY ARTERY: ICD-10-CM

## 2021-01-04 ENCOUNTER — COMMUNICATION - HEALTHEAST (OUTPATIENT)
Dept: INTERNAL MEDICINE | Facility: CLINIC | Age: 61
End: 2021-01-04

## 2021-01-04 DIAGNOSIS — I10 ESSENTIAL HYPERTENSION WITH GOAL BLOOD PRESSURE LESS THAN 140/90: ICD-10-CM

## 2021-02-17 ENCOUNTER — COMMUNICATION - HEALTHEAST (OUTPATIENT)
Dept: INTERNAL MEDICINE | Facility: CLINIC | Age: 61
End: 2021-02-17

## 2021-02-17 DIAGNOSIS — J47.9 BRONCHIECTASIS WITHOUT ACUTE EXACERBATION (H): ICD-10-CM

## 2021-04-14 ENCOUNTER — OFFICE VISIT - HEALTHEAST (OUTPATIENT)
Dept: INTERNAL MEDICINE | Facility: CLINIC | Age: 61
End: 2021-04-14

## 2021-04-14 DIAGNOSIS — J30.89 NON-SEASONAL ALLERGIC RHINITIS DUE TO OTHER ALLERGIC TRIGGER: ICD-10-CM

## 2021-04-14 DIAGNOSIS — Z12.11 ENCOUNTER FOR SCREENING COLONOSCOPY: ICD-10-CM

## 2021-04-14 DIAGNOSIS — I10 ESSENTIAL HYPERTENSION: ICD-10-CM

## 2021-04-14 DIAGNOSIS — J47.9 BRONCHIECTASIS WITHOUT ACUTE EXACERBATION (H): ICD-10-CM

## 2021-04-14 DIAGNOSIS — Z00.00 ROUTINE GENERAL MEDICAL EXAMINATION AT A HEALTH CARE FACILITY: ICD-10-CM

## 2021-04-14 DIAGNOSIS — C44.310 BCC (BASAL CELL CARCINOMA), FACE: ICD-10-CM

## 2021-04-14 DIAGNOSIS — R39.15 URINARY URGENCY: ICD-10-CM

## 2021-04-14 DIAGNOSIS — G47.33 OBSTRUCTIVE SLEEP APNEA: ICD-10-CM

## 2021-04-14 DIAGNOSIS — I25.10 ASYMPTOMATIC ARTERIOSCLEROSIS OF CORONARY ARTERY: ICD-10-CM

## 2021-04-14 LAB
ALBUMIN SERPL-MCNC: 4.1 G/DL (ref 3.5–5)
ALBUMIN UR-MCNC: NEGATIVE G/DL
ALP SERPL-CCNC: 76 U/L (ref 45–120)
ALT SERPL W P-5'-P-CCNC: 27 U/L (ref 0–45)
ANION GAP SERPL CALCULATED.3IONS-SCNC: 10 MMOL/L (ref 5–18)
APPEARANCE UR: CLEAR
AST SERPL W P-5'-P-CCNC: 18 U/L (ref 0–40)
BILIRUB SERPL-MCNC: 0.7 MG/DL (ref 0–1)
BILIRUB UR QL STRIP: NEGATIVE
BUN SERPL-MCNC: 9 MG/DL (ref 8–22)
CALCIUM SERPL-MCNC: 9.2 MG/DL (ref 8.5–10.5)
CHLORIDE BLD-SCNC: 105 MMOL/L (ref 98–107)
CHOLEST SERPL-MCNC: 145 MG/DL
CO2 SERPL-SCNC: 24 MMOL/L (ref 22–31)
COLOR UR AUTO: YELLOW
CREAT SERPL-MCNC: 0.71 MG/DL (ref 0.6–1.1)
ERYTHROCYTE [DISTWIDTH] IN BLOOD BY AUTOMATED COUNT: 13.3 % (ref 11–14.5)
FASTING STATUS PATIENT QL REPORTED: YES
GFR SERPL CREATININE-BSD FRML MDRD: >60 ML/MIN/1.73M2
GLUCOSE BLD-MCNC: 88 MG/DL (ref 70–125)
GLUCOSE UR STRIP-MCNC: NEGATIVE MG/DL
HCT VFR BLD AUTO: 42.8 % (ref 35–47)
HDLC SERPL-MCNC: 53 MG/DL
HGB BLD-MCNC: 14 G/DL (ref 12–16)
HGB UR QL STRIP: NEGATIVE
KETONES UR STRIP-MCNC: NEGATIVE MG/DL
LDLC SERPL CALC-MCNC: 69 MG/DL
LEUKOCYTE ESTERASE UR QL STRIP: NEGATIVE
MCH RBC QN AUTO: 28.5 PG (ref 27–34)
MCHC RBC AUTO-ENTMCNC: 32.7 G/DL (ref 32–36)
MCV RBC AUTO: 87 FL (ref 80–100)
NITRATE UR QL: NEGATIVE
PH UR STRIP: 7 [PH] (ref 5–8)
PLATELET # BLD AUTO: 258 THOU/UL (ref 140–440)
PMV BLD AUTO: 8.9 FL (ref 7–10)
POTASSIUM BLD-SCNC: 4.1 MMOL/L (ref 3.5–5)
PROT SERPL-MCNC: 7.7 G/DL (ref 6–8)
RBC # BLD AUTO: 4.91 MILL/UL (ref 3.8–5.4)
SODIUM SERPL-SCNC: 139 MMOL/L (ref 136–145)
SP GR UR STRIP: 1.01 (ref 1–1.03)
TRIGL SERPL-MCNC: 117 MG/DL
UROBILINOGEN UR STRIP-ACNC: NORMAL
WBC: 6.7 THOU/UL (ref 4–11)

## 2021-04-14 ASSESSMENT — MIFFLIN-ST. JEOR: SCORE: 1397.76

## 2021-04-16 ENCOUNTER — COMMUNICATION - HEALTHEAST (OUTPATIENT)
Dept: INTERNAL MEDICINE | Facility: CLINIC | Age: 61
End: 2021-04-16

## 2021-04-16 DIAGNOSIS — J47.9 BRONCHIECTASIS WITHOUT ACUTE EXACERBATION (H): ICD-10-CM

## 2021-04-29 ENCOUNTER — RECORDS - HEALTHEAST (OUTPATIENT)
Dept: ADMINISTRATIVE | Facility: OTHER | Age: 61
End: 2021-04-29

## 2021-05-28 ASSESSMENT — ASTHMA QUESTIONNAIRES
ACT_TOTALSCORE: 21
ACT_TOTALSCORE: 17
ACT_TOTALSCORE: 22

## 2021-05-29 NOTE — TELEPHONE ENCOUNTER
Patient has an appointment scheduled for 7/19/19 with Dr. Mariano. Routing refill.  Katia Hall CMA ............... 4:55 PM, 06/11/19

## 2021-05-29 NOTE — TELEPHONE ENCOUNTER
Former patient of lynn Hart & has not established care with another provider.  Please assign refill request to covering provider per Clinic standard process.      Refill Approved    Rx renewed per Medication Renewal Policy. Medication was last renewed on 11/2/16.1/10/18    Taylor Davila, [facility] Connection Triage/Med Refill 6/11/2019     Requested Prescriptions   Pending Prescriptions Disp Refills     mometasone (NASONEX) 50 mcg/actuation nasal spray [Pharmacy Med Name: MOMETASONE 50MCG NASAL SPRAY (120)]  0     Sig: SHAKE LIQUID AND USE 2 SPRAYS IN EACH NOSTRIL DAILY       Nasal Steroid Refill Protocol Passed - 6/10/2019 10:13 AM        Passed - Patient has had office visit/physical in last 2 years     Last office visit with prescriber/PCP: 6/13/2018 OR same dept: 2/26/2019 Anson Graves CNP OR same specialty: 2/26/2019 Anson Graves CNP Last physical: 2/6/2018 Last MTM visit: Visit date not found    Next appt within 3 mo: Visit date not found  Next physical within 3 mo: Visit date not found  Prescriber OR PCP: Lynn Hart MD  Last diagnosis associated with med order: 1. Sinus disease  - mometasone (NASONEX) 50 mcg/actuation nasal spray [Pharmacy Med Name: MOMETASONE 50MCG NASAL SPRAY (120)]; SHAKE LIQUID AND USE 2 SPRAYS IN EACH NOSTRIL DAILY; Refill: 0    2. Cough  - albuterol (PROAIR HFA;PROVENTIL HFA;VENTOLIN HFA) 90 mcg/actuation inhaler; Inhale 2 puffs every 6 (six) hours as needed for wheezing.  Dispense: 18 g; Refill: 1     If protocol passes may refill for 12 months if within 3 months of last provider visit (or a total of 15 months).              albuterol (PROAIR HFA;PROVENTIL HFA;VENTOLIN HFA) 90 mcg/actuation inhaler 18 g 1     Sig: Inhale 2 puffs every 6 (six) hours as needed for wheezing.       Albuterol/Levalbuterol Refill Protocol Passed - 6/10/2019 10:13 AM        Passed - PCP or prescribing provider visit in last year     Last office visit with prescriber/PCP: 6/13/2018 Lynn Hart  MD [facility] OR same dept: 2/26/2019 Anson Graves CNP OR same specialty: 2/26/2019 Anson Graves CNP Last physical: 2/6/2018       Next appt within 3 mo: Visit date not found  Next physical within 3 mo: Visit date not found  Prescriber OR PCP: Lynn Hart MD  Last diagnosis associated with med order: 1. Sinus disease  - mometasone (NASONEX) 50 mcg/actuation nasal spray [Pharmacy Med Name: MOMETASONE 50MCG NASAL SPRAY (120)]; SHAKE LIQUID AND USE 2 SPRAYS IN EACH NOSTRIL DAILY; Refill: 0    2. Cough  - albuterol (PROAIR HFA;PROVENTIL HFA;VENTOLIN HFA) 90 mcg/actuation inhaler; Inhale 2 puffs every 6 (six) hours as needed for wheezing.  Dispense: 18 g; Refill: 1    If protocol passes may refill for 6 months if within 3 months of last provider visit (or a total of 9 months). If patient requesting >1 inhaler per month refill x 6 months and have patient make appointment with provider.

## 2021-05-29 NOTE — TELEPHONE ENCOUNTER
Patient called to request these and is reviewing providers to establish care and is due in August for medication check as instructed by Anson Graves CNP on 2/26/2019.

## 2021-05-30 ENCOUNTER — RECORDS - HEALTHEAST (OUTPATIENT)
Dept: ADMINISTRATIVE | Facility: CLINIC | Age: 61
End: 2021-05-30

## 2021-05-30 VITALS — WEIGHT: 194.38 LBS | BODY MASS INDEX: 32.85 KG/M2

## 2021-05-30 VITALS — BODY MASS INDEX: 31.79 KG/M2 | WEIGHT: 188.13 LBS

## 2021-05-30 NOTE — PROGRESS NOTES
RESPIRATORY CARE NOTE     Patient Name: Myriam Atkins  Today's Date: 7/30/2019     Complete PFT done. Pt performed tests with good effort. Test results meet ATS criteria. Albuterol neb given. Results scanned into epic. Pt left in no distress.       Mary Grace Dunlap, LRT    RESPIRATORY CARE NOTE     Patient Name: Myriam Atkins  Today's Date: 7/30/2019     Problem List  Patient Active Problem List   Diagnosis     Osteoarthritis Of The Knee     Serum Total Cholesterol Was Elevated; CT Calcium Score 3 4/19/17     Essential hypertension     Obstructive sleep apnea                           Mary Grace Dunlap LRT

## 2021-05-30 NOTE — PROGRESS NOTES
Office Visit - Follow Up   Myriam Atkins   58 y.o. female    Date of Visit: 7/16/2019    Chief Complaint   Patient presents with     Establish Care     self-referred; previous PCP was Dr. Lynn Hart      Hypertension     bp & med check, refills      Asthma     having to inhaler more frequently - not sure if it's allergy related      Insect Bite     removed tick x 5 days - wants to be tested for lymes         Assessment and Plan   1. Chronic cough  Concerned about the chronic cough and the increasing use of the albuterol.  The cough at least is been going on since March.  Check CT chest.  She is a significant secondhand smoking history.  No first-hand smoking likely.  We will also have her undergo full PFTs and follow-up with me thereafter.  - albuterol (PROAIR HFA;PROVENTIL HFA;VENTOLIN HFA) 90 mcg/actuation inhaler; Inhale 2 puffs every 6 (six) hours as needed for wheezing.  Dispense: 18 g; Refill: 3  - CT Chest Without Contrast; Future  - PFT Complete; Future    2. Essential hypertension with goal blood pressure less than 140/90  Good control.  Continue regimen.  - irbesartan (AVAPRO) 300 MG tablet; Take 1 tablet (300 mg total) by mouth daily.  Dispense: 90 tablet; Refill: 1  - amLODIPine (NORVASC) 5 MG tablet; Take 1 tablet (5 mg total) by mouth daily.  Dispense: 90 tablet; Refill: 1    3. Obstructive sleep apnea  Continue CPAP therapy.    4. Tick bite of abdomen, initial encounter  I reassured her that I do not think that this was even likely a tick but given her exposure to the [facility] and multiple ticks on her dogs etc. we will check Lyme.  - Lyme Antibody Cascade    5. Serum Total Cholesterol Was Elevated; CT Calcium Score 3 4/19/17  She is currently on no medications for lipid lowering.  She is decided to forego that apparently.  Continued blood pressure management.  Continue healthy eating.        Return in about 4 weeks (around 8/13/2019).     History of Present Illness   This 58 y.o.  "old new patient to clinic formally followed by Dr. Lynn Hart at the [facility].  Here to establish care.  Extensive visit.  She is a lot of things to discuss.  She notes that she is been given a inhaler for albuterol in the past due to coughing and chest congestion.  She is been utilizing it more and more recently.  She does not have a history of smoking or asthma.  Mother and aunt both  of lung cancer help.  She wonders of its allergies.  She is using the inhaler several days a week.  She does have sleep apnea and is using that.  She also notes she pulled something out of her bellybutton.  She thinks it might be a tick.  No rashes.  Want to be checked for Lyme because she has a cabin in [place], [place].    She has a history of knee arthroplasties bilaterally.    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  Helps care for her father who is alive at 85.  She has 4 grandchildren.  [place]  She reads and does some quilting.  [place] and went to [place].   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  They live in [place].    Review of Systems: A comprehensive review of systems was negative except as noted.     Medications, Allergies and Problem List   Reviewed, reconciled and updated  Post Discharge Medication Reconciliation Status:      Physical Exam   General Appearance:   Delightful woman in no distress.  Overweight.  Lungs are clear.  Heart is regular.  HEENT is unremarkable.  Abdomen is obese soft nontender.  Tiny red dot in an area of her umbilicus.  The lesion that she brings in which she thought was a tick actually looks more like a comedone plug.    /80 (Patient Site: Right Arm, Patient Position: Sitting, Cuff Size: Adult Regular)   Pulse 74   Ht 5' 4\" (1.626 m)   Wt 196 lb (88.9 kg)   SpO2 97%   BMI 33.64 kg/m      \     Additional Information   Current Outpatient Medications   Medication Sig Dispense Refill     albuterol (PROAIR " HFA;PROVENTIL HFA;VENTOLIN HFA) 90 mcg/actuation inhaler Inhale 2 puffs every 6 (six) hours as needed for wheezing. 18 g 3     amLODIPine (NORVASC) 5 MG tablet Take 1 tablet (5 mg total) by mouth daily. 90 tablet 1     cholecalciferol, vitamin D3, 1,000 unit tablet Take 2,000 Units by mouth daily.       DOCOSAHEXANOIC ACID/EPA (FISH OIL ORAL) Take 2 capsules by mouth daily.        irbesartan (AVAPRO) 300 MG tablet Take 1 tablet (300 mg total) by mouth daily. 90 tablet 1     mometasone (NASONEX) 50 mcg/actuation nasal spray SHAKE LIQUID AND USE 2 SPRAYS IN EACH NOSTRIL DAILY 17 g 0     clindamycin (CLEOCIN) 300 MG capsule Take 1 capsule (300 mg total) by mouth as needed (TAKE 2 CAPS one hr BEFORE DENTAL APPT). 4 capsule 3     No current facility-administered medications for this visit.      Allergies   Allergen Reactions     Hydrochlorothiazide Other (See Comments)     Fatigue and confusion     Latex      Hypersentivity      Penicillins      Sulfamethoxazole-Trimethoprim      Tingling in hands       Social History     Tobacco Use     Smoking status: Never Smoker     Smokeless tobacco: Never Used   Substance Use Topics     Alcohol use: Yes     Alcohol/week: 4.2 oz     Types: 7 Glasses of wine per week     Drug use: Not on file       Review and/or order of clinical lab tests:  Review and/or order of radiology tests:  Review and/or order of medicine tests:  Discussion of test results with performing physician:  Decision to obtain old records and/or obtain history from someone other than the patient:  Review and summarization of old records and/or obtaining history from someone other than the patient and.or discussion of case with another health care provider:  Independent visualization of image, tracing or specimen itself:    Time: total time spent with the patient was 40 minutes of which >50% was spent in counseling and coordination of care     Musa Mariano MD

## 2021-05-31 VITALS — BODY MASS INDEX: 31.52 KG/M2 | WEIGHT: 186.5 LBS

## 2021-05-31 VITALS — BODY MASS INDEX: 31.28 KG/M2 | WEIGHT: 185.06 LBS

## 2021-05-31 NOTE — PROGRESS NOTES
Office Visit - Follow Up   Myriam Atkins   59 y.o. female    Date of Visit: 8/27/2019    Chief Complaint   Patient presents with     Cough     1 mo f/u - review results of CT & PFT test     Referral     ? if she should see a allergist         Assessment and Plan   1. Bronchiectasis without acute exacerbation (H)  Trial of inhaled steroid and meet with pulmonary for further evaluation.  - Ambulatory referral to Pulmonology  - fluticasone propionate (FLOVENT HFA) 110 mcg/actuation inhaler; Inhale 2 puffs Daily at 8:00 am..  Dispense: 1 Inhaler; Refill: 2    2. Chronic cough  As above.    3. Asymptomatic arteriosclerosis of coronary artery  Discussed she would be best off with a statin for primary prevention.  Begin rosuvastatin 10 mg daily.  I discussed with her potential complications that can occur with this medication as well as side effects etc.  She will return in about 8 weeks for labs.  - rosuvastatin (CRESTOR) 10 MG tablet; Take 1 tablet (10 mg total) by mouth at bedtime.  Dispense: 30 tablet; Refill: 2  - Lipid Cascade; Future  - Hepatic Profile; Future    4. Essential hypertension  Blood pressures not great.  I have suggested she follows as an outpatient.  Return in about 3 months for physical recheck at that time.    5. Non-seasonal allergic rhinitis due to other allergic trigger  Continue her nasal steroid.        Return in about 3 months (around 11/27/2019) for Annual physical.     History of Present Illness   This 59 y.o. old new patient here for follow-up.  I seen her once before.  At that time she was complaining of a lot of coughing.  She also has chronic allergies.  Certain smells seem to make things worse.  She is on nasal steroid but only the unscented variety.  She was having months of chronic cough.  She underwent further evaluation including CT of the chest which showed evidence of bronchiectasis as well as pulmonary function testing which were essentially normal.  She is utilizing albuterol  "as needed.  She is also using her nasal steroid as noted.    She is also found to have coronary disease on CT scan.  Mild but it is present.  She has multiple cardiac risk factors.    Review of Systems: A comprehensive review of systems was negative except as noted.     Medications, Allergies and Problem List   Reviewed, reconciled and updated  Post Discharge Medication Reconciliation Status:      Physical Exam   General Appearance:   Very pleasant woman in no distress.  Good spirits.  Further exam deferred.  Blood pressure is on the higher side.    /80 (Patient Site: Right Arm, Patient Position: Sitting, Cuff Size: Adult Regular)   Pulse 76   Ht 5' 4\" (1.626 m)   Wt 199 lb (90.3 kg)   SpO2 95%   BMI 34.16 kg/m           Additional Information   Current Outpatient Medications   Medication Sig Dispense Refill     albuterol (PROAIR HFA;PROVENTIL HFA;VENTOLIN HFA) 90 mcg/actuation inhaler Inhale 2 puffs every 6 (six) hours as needed for wheezing. 18 g 3     amLODIPine (NORVASC) 5 MG tablet Take 1 tablet (5 mg total) by mouth daily. 90 tablet 1     cholecalciferol, vitamin D3, 1,000 unit tablet Take 2,000 Units by mouth daily.       clindamycin (CLEOCIN) 300 MG capsule Take 1 capsule (300 mg total) by mouth as needed (TAKE 2 CAPS one hr BEFORE DENTAL APPT). 4 capsule 3     DOCOSAHEXANOIC ACID/EPA (FISH OIL ORAL) Take 2 capsules by mouth daily.        irbesartan (AVAPRO) 300 MG tablet Take 1 tablet (300 mg total) by mouth daily. 90 tablet 1     fluticasone propionate (FLOVENT HFA) 110 mcg/actuation inhaler Inhale 2 puffs Daily at 8:00 am.. 1 Inhaler 2     mometasone (NASONEX) 50 mcg/actuation nasal spray USE 2 SPRAYS IN EACH NOSTRIL DAILY. Needs to be unscented or it won't work for patient. 17 g 3     rosuvastatin (CRESTOR) 10 MG tablet Take 1 tablet (10 mg total) by mouth at bedtime. 30 tablet 2     No current facility-administered medications for this visit.      Allergies   Allergen Reactions     " Hydrochlorothiazide Other (See Comments)     Fatigue and confusion     Latex      Hypersentivity      Penicillins      Sulfamethoxazole-Trimethoprim      Tingling in hands       Social History     Tobacco Use     Smoking status: Never Smoker     Smokeless tobacco: Never Used   Substance Use Topics     Alcohol use: Yes     Alcohol/week: 4.2 oz     Types: 7 Glasses of wine per week     Drug use: Not on file       Review and/or order of clinical lab tests:  Review and/or order of radiology tests:  Review and/or order of medicine tests:  Discussion of test results with performing physician:  Decision to obtain old records and/or obtain history from someone other than the patient:  Review and summarization of old records and/or obtaining history from someone other than the patient and.or discussion of case with another health care provider:  Independent visualization of image, tracing or specimen itself:    Time: not applicable     Musa Mariano MD

## 2021-05-31 NOTE — TELEPHONE ENCOUNTER
----- Message from Musa Mariano MD sent at 7/31/2019  6:06 PM CDT -----  Please call pt:    Lung scan shows some chronic bronchitis type changes of the airways.  You also have some evidence of minimal plaque in the arteries around the heart.  We will discuss this further at your follow-up.

## 2021-05-31 NOTE — TELEPHONE ENCOUNTER
August 27 is fine unless she has had some new change in her symptoms such as fever or shortness of breath.

## 2021-05-31 NOTE — TELEPHONE ENCOUNTER
Patient Returning Call  Reason for call:  Results  Information relayed to patient:  The writer read the following to patient per Dr Mariano: Lung scan shows some chronic bronchitis type changes of the airways.  You also have some evidence of minimal plaque in the arteries around the heart.  We will discuss this further at your follow-up.    result letter dated 8/1/19.  Patient has additional questions:  Yes   If YES, what are your questions/concerns: Patient has an appointment on August 27 2019.  IS this date ok?  Should she come in sooner?  She will be out of town  August 17-August 22.  Please advise.   Okay to leave a detailed message?:Yes

## 2021-05-31 NOTE — TELEPHONE ENCOUNTER
Prior authorization patient would like to start PA process for getting her Nasonex 2 spray into each nostril daily. Needs to be unscented or it won't work for her. Patient reports that she is super sensitive to smell and the nasonex has work for her for several years.

## 2021-06-01 VITALS — HEIGHT: 64 IN | BODY MASS INDEX: 31.1 KG/M2 | WEIGHT: 182.19 LBS

## 2021-06-01 VITALS — WEIGHT: 185.2 LBS | BODY MASS INDEX: 31.79 KG/M2

## 2021-06-01 NOTE — PATIENT INSTRUCTIONS - HE
Thank you for coming in for your appointment to discuss your cough.     Your imaging showed very mild bronchiectasis. Bronchiectasis is a permanent enlargement of airways that can be congenital or potentially result of frequent respiratory infections. Not all persons with bronchiectasis have any respiratory symptoms (such as cough) and not all persons with cough have bronchiectasis.     Your lung function testing showed normal lung function, normal lung volumes, and normal diffusion capacity (measure of how well oxygen passes through the lung tissue).     Plan:     Continue with Flovent -- you will likely need to continue using it for the rest of your life.     OK to use Flovent once daily -- OK to do outside of 8 am    If your breathing gets worse overtime or you have a cold coming on, start using it twice a day    Try using albuterol 15-30 minutes before exercise to see if it impacts your breathing (in a good way)    Flu shot, pneumonia shot, shingles shot    Please, remember that appropriate use of inhalers is essential to their efficacy. If you cannot remember the instructions on how to properly use the prescribed inhalers, you can visit your pharmacist to request a demonstration, or you can review a video online. There are many helpful videos on YouTube that you can access to review instructions and demonstrations on the use of different inhalers. You should exercise common sense when looking for videos -- best and most informative inhaler videos come from health care organizations.    You are welcome to follow up on an as needed basis if your breathing worsen, or you are welcome to follow here once a year for your asthma.    If you have any questions or concerns, please, call our clinic at [phone].     My Asthma Action Plan    Name: Myriam tAkins   YOB: 1960  Date: 9/20/2019   My doctor: Sherley Grier MD   My clinic: [facility]        My Control Medicine:  Fluticasone propionate (Flovent HFA) - 110 mcg 2 puffs once a day (can increase to twice a day)  My Rescue Medicine: Albuterol (Proair/Ventolin/Proventil HFA) 2-4 puffs EVERY 4 HOURS as needed. Use a spacer if recommended by your provider.   My Oral Steroid Medicine: NONE My Asthma Severity:   Mild Persistent  Know your asthma triggers: smoke, upper respiratory infections, pollens, strong odors and fumes and exercise or sports               GREEN ZONE   Good Control    I feel good    No cough or wheeze    Can work, sleep and play without asthma symptoms     Take your asthma control medicine every day.     1. If exercise triggers your asthma, take your rescue medication    15 minutes before exercise or sports, and    During exercise if you have asthma symptoms  2. Spacer to use with inhaler: If you have a spacer, make sure to use it with your inhaler             YELLOW ZONE Getting Worse  I have ANY of these:    I do not feel good    Cough or wheeze    Chest feels tight    Wake up at night 1. Keep taking your Green Zone medications  2. Start taking your rescue medicine:    every 20 minutes for up to 1 hour. Then every 4 hours for 24-48 hours.  3. If you stay in the Yellow Zone for more than 12-24 hours, contact your doctor.  4. If you do not return to the Green Zone in 12-24 hours or you get worse, start taking your oral steroid medicine if prescribed by your provider.           RED ZONE Medical Alert - Get Help  I have ANY of these:    I feel awful    Medicine is not helping    Breathing getting harder    Trouble walking or talking    Nose opens wide to breathe     1. Take your rescue medicine NOW  2. If your provider has prescribed an oral steroid medicine, start taking it NOW  3. Call your doctor NOW  4. If you are still in the Red Zone after 20 minutes and you have not reached your doctor:    Take your rescue medicine again and    Call 911 or go to the emergency room right away    See your regular doctor within 2  weeks of an Emergency Room or Urgent Care visit for follow-up treatment.          Annual Reminders:  Meet with Asthma Educator,  Flu Shot in the Fall, consider Pneumonia Vaccination for patients with asthma (aged 19 and older).    Pharmacy:   [id number] - [place] - [address] AT [facility] [address]  [address] [address]  Phone: [phone] Fax: [phone]                            Asthma Triggers  How To Control Things That Make Your Asthma Worse    Triggers are things that make your asthma worse.  Look at the list below to help you find your triggers and what you can do about them.  You can help prevent asthma flare-ups by staying away from your triggers.      Trigger                                                          What you can do   Cigarette Smoke  Tobacco smoke can make asthma worse. Do not allow smoking in your home, car or around you.  Be sure no one smokes at a child s day care or school.  If you smoke, ask your health care provider for ways to help you quit.  Ask family members to quit too.  Ask your health care provider for a referral to Quit Plan to help you quit smoking, or call 3-487-904-PLAN.     Colds, Flu, Bronchitis  These are common triggers of asthma. Wash your hands often.  Don t touch your eyes, nose or mouth.  Get a flu shot every year.     Dust Mites  These are tiny bugs that live in cloth or carpet. They are too small to see. Wash sheets and blankets in hot water every week.   Encase pillows and mattress in dust mite proof covers.  Avoid having carpet if you can. If you have carpet, vacuum weekly.   Use a dust mask and HEPA vacuum.   Pollen and Outdoor Mold  Some people are allergic to trees, grass, or weed pollen, or molds. Try to keep your windows closed.  Limit time out doors when pollen count is high.   Ask you health care provider about taking medicine during allergy season.     Animal Dander  Some people are allergic to  skin flakes, urine or saliva from pets with fur or feathers. Keep pets with fur or feathers out of your home.    If you can t keep the pet outdoors, then keep the pet out of your bedroom.  Keep the bedroom door closed.  Keep pets off cloth furniture and away from stuffed toys.     Mice, Rats, and Cockroaches   Some people are allergic to the waste from these pests.   Cover food and garbage.  Clean up spills and food crumbs.  Store grease in the refrigerator.   Keep food out of the bedroom.   Indoor Mold  This can be a trigger if your home has high moisture. Fix leaking faucets, pipes, or other sources of water.   Clean moldy surfaces.  Dehumidify basement if it is damp and smelly.   Smoke, Strong Odors, and Sprays  These can reduce air quality. Stay away from strong odors and sprays, such as perfume, powder, hair spray, paints, smoke incense, paint, cleaning products, candles and new carpet.   Exercise or Sports  Some people with asthma have this trigger. Be active!  Ask your doctor about taking medicine before sports or exercise to prevent symptoms.    Warm up for 5-10 minutes before and after sports or exercise.     Other Triggers of Asthma  Cold air:  Cover your nose and mouth with a scarf.  Sometimes laughing or crying can be a trigger.  Some medicines and food can trigger asthma.

## 2021-06-01 NOTE — PROGRESS NOTES
Pulmonary Clinic Outpatient Consultation  9/20/2019     Assessment and Plan:   59 year old non-smoker with the most pertinent medical history of obesity, HTN, chronic rhinitis, seasonal and environmental allergies, presenting for evaluation of chronic cough, recurrent bronchitis, and exertional dyspnea.  Patient's clinical presentation is significant with asthma.  Based on my review of her imaging her bronchiectasis is nominal and is unlikely to be the sole cause of her respiratory symptoms (might cause cough, but would not cause progressively worsening exertional dyspnea).    #.  Mild persistent asthma, improved control since starting Flovent once daily.  #.  Very mild bronchiectasis, will suspect this to be purely radiographic rather than clinical finding.  #.  Seasonal and environmental allergies, significant.      Continue Flovent daily; can increase use to twice daily if her respiratory symptoms worsen, during a flare of her allergy symptoms, or during a respiratory illness    Reviewed patient's imaging with her and reassured her of the results    Per her request I referred patient to Allergy -- I believe she may benefit from starting an over-the-counter medication such as cetirizine or potentially montelukast.    Patient received Pneumovax-23 and influenza vaccines today    Patient should return to clinic on an as-needed basis for a follow-up if her asthma worsens, otherwise she can follow-up once a year for her asthma.  Since she has mild asthma but appears to be well controlled, it would be appropriate for her to be followed up by her PCP, which patient states she would prefer at this time.    I appreciate the opportunity to participate in the care of Myriam Atkins.  Please, feel free to contact me at any time.    Sherley Grier MD  Pulmonary and Critical Care   ______________________________________________________________________________    CC: Bronchiectasis    HPI:   Myriam Atkins is a 59 y.o. never smoker  with history of HTN, obesity, chronic rhinitis, presenting today for evaluation of incidentally noted bronchiectasis on chest imaging.    Patient reports that over the last 6 months she has noted increasing exertional dyspnea that is most pronounced on inclines or when she carries laundry.  She has had lifelong respiratory issues, primarily cough that would be triggered by strong smells or scents, as well as environmental allergies.  Sometimes she would get cough with physical activity.  She had frequent episodes of bronchitis as a child as well as a young adult.  She reports being slower than most kids her age due to her breathing.  She started using albuterol over 10 years ago.  She normally uses it when she has significant bronchiectasis episodes.  However over the last 6 months she has noticed increasing her albuterol use.  Albuterol always has been helpful for her.    She reports significant seasonal (usually fall) and environmental allergies (particularly strong sense and aerosolized chemicals).  She used to get allergy shots when her kids were younger, and would like to reestablish care with an allergist now.    Patient was prescribed Flovent by her PCP couple months ago and has been using it every morning.  She reports that her breathing and her cough have improved since starting Flovent.    She has been diagnosed with LENI about 4 years ago and has been successfully using CPAP.    ROS:  Review of Systems - 10 point ROS reviewed and noted to be negative w/ exceptions as detailed in the HPI.    PMH:  Patient Active Problem List    Diagnosis Date Noted     Asymptomatic arteriosclerosis of coronary artery 08/27/2019     Bronchiectasis without acute exacerbation (H) 08/27/2019     Non-seasonal allergic rhinitis 08/27/2019     Obstructive sleep apnea      Osteoarthritis Of The Knee      Serum Total Cholesterol Was Elevated; CT Calcium Score 3 4/19/17      Essential hypertension      PSH:  Past Surgical History:    Procedure Laterality Date     JOINT REPLACEMENT       Allergies:  Allergies   Allergen Reactions     Hydrochlorothiazide Other (See Comments)     Fatigue and confusion     Latex      Hypersentivity      Penicillins      Sulfamethoxazole-Trimethoprim      Tingling in hands       Family HX:  Family History   Problem Relation Age of Onset     Colon cancer Paternal Grandmother      Colon cancer Paternal Aunt      Lung cancer Mother      Thyroid disease Maternal Aunt      Social Hx:  Social History     Socioeconomic History     Marital status:      Spouse name: Not on file     Number of children: Not on file     Years of education: Not on file     Highest education level: Not on file   Occupational History     Not on file   Social Needs     Financial resource strain: Not on file     Food insecurity:     Worry: Not on file     Inability: Not on file     Transportation needs:     Medical: Not on file     Non-medical: Not on file   Tobacco Use     Smoking status: Never Smoker     Smokeless tobacco: Never Used   Substance and Sexual Activity     Alcohol use: Yes     Alcohol/week: 4.2 oz     Types: 7 Glasses of wine per week     Drug use: Not on file     Sexual activity: Not on file   Lifestyle     Physical activity:     Days per week: Not on file     Minutes per session: Not on file     Stress: Not on file   Relationships     Social connections:     Talks on phone: Not on file     Gets together: Not on file     Attends [employer] service: Not on file     Active member of club or organization: Not on file     Attends meetings of clubs or organizations: Not on file     Relationship status: Not on file     Intimate partner violence:     Fear of current or ex partner: Not on file     Emotionally abused: Not on file     Physically abused: Not on file     Forced sexual activity: Not on file   Other Topics Concern     Not on file   Social History Narrative     Not on file     Current Meds:  Current Outpatient Medications  "  Medication Sig Dispense Refill     albuterol (PROAIR HFA;PROVENTIL HFA;VENTOLIN HFA) 90 mcg/actuation inhaler Inhale 2 puffs every 6 (six) hours as needed for wheezing. 18 g 3     amLODIPine (NORVASC) 5 MG tablet Take 1 tablet (5 mg total) by mouth daily. 90 tablet 1     cholecalciferol, vitamin D3, 1,000 unit tablet Take 2,000 Units by mouth daily.       clindamycin (CLEOCIN) 300 MG capsule Take 1 capsule (300 mg total) by mouth as needed (TAKE 2 CAPS one hr BEFORE DENTAL APPT). 4 capsule 3     DOCOSAHEXANOIC ACID/EPA (FISH OIL ORAL) Take 2 capsules by mouth daily.        fluticasone propionate (FLOVENT HFA) 110 mcg/actuation inhaler Inhale 2 puffs Daily at 8:00 am.. 1 Inhaler 2     irbesartan (AVAPRO) 300 MG tablet Take 1 tablet (300 mg total) by mouth daily. 90 tablet 1     mometasone (NASONEX) 50 mcg/actuation nasal spray USE 2 SPRAYS IN EACH NOSTRIL DAILY. Needs to be unscented or it won't work for patient. 17 g 3     rosuvastatin (CRESTOR) 10 MG tablet Take 1 tablet (10 mg total) by mouth at bedtime. 30 tablet 2     No current facility-administered medications for this visit.      Physical Exam:  /74   Pulse 68   Resp 20   Ht 5' 4\" (1.626 m)   Wt 195 lb 8 oz (88.7 kg)   SpO2 98% Comment: RA  BMI 33.56 kg/m    Gen: obese  womanalert, oriented, no distress  HEENT: no oropharyngeal lesions, no cervical or supraclavicular lymphadenopathy; no stridor  CV: RRR, no M/G/R  Resp: equal bilateral air entry, breath sounds clear throughout, no focal crackles or wheezes; able to converse in full sentences w/ no respiratory distress  Abd: soft, nontender, no palpable organomegaly  Skin: no apparent rashes  Ext: no cyanosis, clubbing or edema  Neuro: alert, nonfocal    Labs: personally reviewed in the EMR.    Imaging studies: personally reviewed and interpreted. Below are the Radiology interpretations.  #. CT chest, 7/30/19: On personal review there is normal lung parenchyma, no acute " lesions/opacities, nominal bronchiectasis.  LUNGS AND PLEURA: Patient has mild cylindrical bronchiectasis in both lower lobes. No retained secretions or peribronchial thickening. No parenchymal disease or pulmonary nodules.  MEDIASTINUM: No adenopathy. Minimal coronary artery calcifications.  LIMITED UPPER ABDOMEN: Unremarkable.  MUSCULOSKELETAL: Unremarkable.    PFT's (7/30/2019): Normal spirometry without clinically significant bronchodilator response (obeyed with significant response and smaller airways), normal lung volumes, normal.  Normal testing does not exclude asthma in an appropriate clinical setting.  FEV1/FVC is 0.83 and is normal. FEV1 is 2.64 L (106 % predicted) and is normal. FVC is 3.18 L (101 % predicted) and normal. There was no improvement in spirometry after a single inhaled dose of bronchodilator. TLC is 5.13 L (103 % predicted) and is normal. DLCO is 108 % predicted and is normal when it is corrected for hemoglobin.

## 2021-06-02 VITALS — BODY MASS INDEX: 33.47 KG/M2 | WEIGHT: 195 LBS

## 2021-06-02 NOTE — TELEPHONE ENCOUNTER
Who is calling:  Patient   Reason for Call:  Patient is requesting to send Lab orders from [facility] to [facility] in [place] . Fax # [phone] . Patient is at Lab in [place] .   Date of last appointment with primary care: 8/27/19  Okay to leave a detailed message: No

## 2021-06-02 NOTE — PROGRESS NOTES
Chief complaint: Allergies, possible asthma    History of present illness: This is a pleasant 59-year-old woman I was asked to see for evaluation by Dr Grier in regards to allergies.  She had allergies lifelong and was on allergy shots 25 to 30 years ago.  She reports that over the last year or so she has been having some difficulties with breathing.  She states that she is exposed to strong smells, she will develop coughing fits.  She states that she was diagnosed with possible asthma recently.  She is currently taking Flovent 110 mcg 2 puffs daily.  She reports that with this she feels that she has more energy.  That she had less coughing fits but does still experience these symptoms if exposed to strong smells.  For example, this week and she was in [place] at a play.  She states that she sat down and had a width of a strong smell.  She started coughing and took her albuterol inhaler which resolved the symptoms.  She does not take any allergy medication regularly and occasion.  She states that she was diagnosed with food allergies many years ago.  She states with citric acid foods she would have itchy throat and itchy mouth.  She states this happens with several fruits and vegetables.  If she eats in the baked or processed form this does not seem to happen.  No hives, swelling or shortness of breath after eating foods, however.    Past medical history: Tonsillectomy, sinus surgery, knee replacement, hypertension, obstructive sleep apnea    Social history: She lives at home with central air and basement, no pets at home, non-smoking environment, no exposure to mold but thinks there might be some mold on the roof of her house    Family history: Mother, brother and children with allergies    Review of Systems performed as above and the remainder is negative    Current Outpatient Medications:      albuterol (PROAIR HFA;PROVENTIL HFA;VENTOLIN HFA) 90 mcg/actuation inhaler, Inhale 2 puffs every 6 (six) hours as needed  "for wheezing., Disp: 18 g, Rfl: 3     amLODIPine (NORVASC) 5 MG tablet, Take 1 tablet (5 mg total) by mouth daily., Disp: 90 tablet, Rfl: 1     cholecalciferol, vitamin D3, 1,000 unit tablet, Take 2,000 Units by mouth daily., Disp: , Rfl:      clindamycin (CLEOCIN) 300 MG capsule, Take 1 capsule (300 mg total) by mouth as needed (TAKE 2 CAPS one hr BEFORE DENTAL APPT)., Disp: 4 capsule, Rfl: 3     DOCOSAHEXANOIC ACID/EPA (FISH OIL ORAL), Take 2 capsules by mouth daily. , Disp: , Rfl:      fluticasone propionate (FLOVENT HFA) 110 mcg/actuation inhaler, Inhale 2 puffs Daily at 8:00 am.., Disp: 1 Inhaler, Rfl: 12     irbesartan (AVAPRO) 300 MG tablet, Take 1 tablet (300 mg total) by mouth daily., Disp: 90 tablet, Rfl: 1     mometasone (NASONEX) 50 mcg/actuation nasal spray, USE 2 SPRAYS IN EACH NOSTRIL DAILY. Needs to be unscented or it won't work for patient., Disp: 17 g, Rfl: 3     rosuvastatin (CRESTOR) 10 MG tablet, Take 1 tablet (10 mg total) by mouth at bedtime., Disp: 30 tablet, Rfl: 2    Allergies   Allergen Reactions     Hydrochlorothiazide Other (See Comments)     Fatigue and confusion     Latex      Hypersentivity      Penicillins      Sulfamethoxazole-Trimethoprim      Tingling in hands         Resp 16   Ht 5' 4\" (1.626 m)   Wt 200 lb 1.6 oz (90.8 kg)   BMI 34.35 kg/m    Gen: Pleasant female not in acute distress  HEENT: Eyes no erythema of the bulbar or palpebral conjunctiva, no edema. Ears: TMs well visualized, no effusions. Nose: No congestion, mucosa normal. Mouth: Throat clear, no lip or tongue edema.   Cardiac: Regular rate and rhythm, no murmurs, rubs or gallops  Respiratory: Clear to auscultation bilaterally, no adventitious breath sounds  Lymph: No supraclavicular or cervical lymphadenopathy  Skin: No rashes or lesions  Psych: Alert and oriented times 3    FENO: 21    Last Percutaneous Allergy Test Results  Trees  Ty, White  1:20 H  (W/F in mm): 0*0 (10/07/19 1103)  Birch Mix 1:20 H (W/F in " mm): 0*0 (10/07/19 1103)  Lares, Common 1:20 H (W/F in mm): 0*0 (10/07/19 1103)  Elm, American 1:20 H (W/F in mm): 0*0 (10/07/19 1103)  Swain, Shagbark 1:20 H (W/F in mm): 0*0 (10/07/19 1103)  Maple, Hard/Sugar 1:20 H (W/F in mm): 0*0 (10/07/19 1103)  Novelty Mix 1:20 H (W/F in mm): 0*0 (10/07/19 1103)  [place], Red 1:20 H (W/F in mm): 0*0 (10/07/19 1103)  [place], American 1:20 H (W/F in mm): 0*0 (10/07/19 1103)  Mon[place]uma Tree 1:20 H (W/F in mm): 0*0 (10/07/19 1103)  Dust Mites  D. Pteronyssinus Mite 30,000 AU/ML H (W/F in mm): 0*0 (10/07/19 1103)  D. Farinae Mite 30,000 AU/ML H (W/F in mm: 0*0 (10/07/19 1103)  Grasses  Grass Mix #4 10,000 BAU/ML H: 0*0 (10/07/19 1103)  Tanner Grass 1:20 H (W/F in mm): 0*0 (10/07/19 1103)  Cockroach  Cockroach Mix 1:10 H (W/F in mm): 0*0 (10/07/19 1103)  Molds/Fungi  Alternaria Tenuis 1:10 H (W/F in mm): 0*0 (10/07/19 1103)  Aspergillus Fumigatus 1:10 H (W/F in mm): 0*0 (10/07/19 1103)  Homodendrum Cladosporioides 1:10 H (W/F in mm): 0*0 (10/07/19 1103)  Penicillin Notatum 1:10 H (W/F in mm): 0*0 (10/07/19 1103)  Epicoccum 1:10 H (W/F in mm): 0*0 (10/07/19 1103)  Weeds  Ragweed, Short 1:20 H (W/F in mm): 11/40 (10/07/19 1103)  Dock, Sorrel 1:20 H (W/F in mm): 0*0 (10/07/19 1103)  Lamb's Quarter 1:20 H (W/F in mm): 0*0 (10/07/19 1103)  Pigweed, Rough Red Root 1:20 H  (W/F in mm): 0*0 (10/07/19 1103)  Plantain, English 1:20 H  (W/F in mm): 0*0 (10/07/19 1103)  Sagebrush, Mugwort 1:20 H  (W/F in mm): 5/20 (10/07/19 1103)  Animal  Cat 10,000 BAU/ML H (W/F in mm): 0*0 (10/07/19 1103)  Dog 1:10 H (W/F in mm): 0*0 (10/07/19 1103)  Controls  Device Type: QUINTIP (10/07/19 1103)  Neg. control: 50% Glycerine/Saline H (W/F in mm): 0*0 (10/07/19 1103)  Pos. control: Histamine 6mg/ML (W/F in mms): 4/10 (10/07/19 1103)    Impression report and plan:  1.  Allergic rhinitis to ragweed  2.  Mild persistent asthma  3.  Oral allergy syndrome    Patient has oral allergy syndrome rather than true  IgE mediated food allergy.  Explained the difference.  Food allergy testing for this reason is not necessary.  Recommended antihistamine or Nasonex.  She is currently using Nasonex.  Could add montelukast to this regimen.  Cautioned her to the rare side effect of mood disturbance.  She has a component of nonallergic and allergic rhinitis.  She also has a component of nonallergic asthma.  Stated that allergic symptoms would improve with allergy shots.  I went over the risks and benefits of allergy shots.  I stated risks include hives, swelling, shortness of breath.  I did state that one in 2.5 million shot administrations can result in death.  I stated they must wait in the office for 30 minutes following the shot and carry an epinephrine device on the day of the shot.  I stated that shots are effective in about 90% of patients.  I stated that they should check with the insurance company prior to proceeding.  They understand the risks and benefits and will let me know how she would like to proceed.  She has a consent form with her.

## 2021-06-02 NOTE — TELEPHONE ENCOUNTER
Called made via the Simran Lundberg from our clinic has already faxed the orders over to [place] location.

## 2021-06-02 NOTE — PATIENT INSTRUCTIONS - HE
Ragweed, Mugwort---Aug-October    Montelukast plus antihistamine August 1st---    Could consider allergy shots

## 2021-06-03 VITALS
BODY MASS INDEX: 33.38 KG/M2 | RESPIRATION RATE: 20 BRPM | DIASTOLIC BLOOD PRESSURE: 74 MMHG | HEART RATE: 68 BPM | SYSTOLIC BLOOD PRESSURE: 114 MMHG | OXYGEN SATURATION: 98 % | WEIGHT: 195.5 LBS | HEIGHT: 64 IN

## 2021-06-03 VITALS — RESPIRATION RATE: 16 BRPM | WEIGHT: 200.1 LBS | BODY MASS INDEX: 34.16 KG/M2 | HEIGHT: 64 IN

## 2021-06-03 VITALS — BODY MASS INDEX: 33.46 KG/M2 | HEIGHT: 64 IN | WEIGHT: 196 LBS

## 2021-06-03 VITALS — WEIGHT: 199 LBS | BODY MASS INDEX: 33.97 KG/M2 | HEIGHT: 64 IN

## 2021-06-03 NOTE — TELEPHONE ENCOUNTER
Refill Approved    Rx renewed per Medication Renewal Policy. Medication was last renewed on 8/27/19.    Taylor Davila, [facility] Triage/Med Refill 11/21/2019     Requested Prescriptions   Pending Prescriptions Disp Refills     rosuvastatin (CRESTOR) 10 MG tablet [Pharmacy Med Name: ROSUVASTATIN 10MG TABLETS] 30 tablet 0     Sig: TAKE 1 TABLET(10 MG) BY MOUTH AT BEDTIME       Statins Refill Protocol (Hmg CoA Reductase Inhibitors) Passed - 11/20/2019  3:26 AM        Passed - PCP or prescribing provider visit in past 12 months      Last office visit with prescriber/PCP: 8/27/2019 Musa Mariano MD OR same dept: 8/27/2019 Musa Mariano MD OR same specialty: 8/27/2019 Musa Mariano MD  Last physical: Visit date not found Last MTM visit: Visit date not found   Next visit within 3 mo: Visit date not found  Next physical within 3 mo: Visit date not found  Prescriber OR PCP: Musa Mariano MD  Last diagnosis associated with med order: 1. Asymptomatic arteriosclerosis of coronary artery  - rosuvastatin (CRESTOR) 10 MG tablet [Pharmacy Med Name: ROSUVASTATIN 10MG TABLETS]; TAKE 1 TABLET(10 MG) BY MOUTH AT BEDTIME  Dispense: 30 tablet; Refill: 0    If protocol passes may refill for 12 months if within 3 months of last provider visit (or a total of 15 months).

## 2021-06-04 VITALS
OXYGEN SATURATION: 98 % | WEIGHT: 192 LBS | DIASTOLIC BLOOD PRESSURE: 70 MMHG | SYSTOLIC BLOOD PRESSURE: 118 MMHG | HEART RATE: 67 BPM | BODY MASS INDEX: 32.96 KG/M2

## 2021-06-04 VITALS
OXYGEN SATURATION: 97 % | HEIGHT: 64 IN | DIASTOLIC BLOOD PRESSURE: 80 MMHG | WEIGHT: 195 LBS | SYSTOLIC BLOOD PRESSURE: 122 MMHG | HEART RATE: 68 BPM | BODY MASS INDEX: 33.29 KG/M2

## 2021-06-04 NOTE — PROGRESS NOTES
Office Visit - Physical   Myriam Atkins   59 y.o.  female    Date of visit: 12/10/2019  Physician: Musa Mariano MD     Assessment and Plan   1. Routine general medical examination at a health care facility  She lives a healthy lifestyle.  Up-to-date on her health maintenance.  We will get her shingles vaccine will probably wait until she gets back from [place].  We will try to track down her bone density.  Counseled on calcium and vitamin D intake today.  - Lipid Cascade  - Comprehensive Metabolic Panel  - HM2(CBC w/o Differential)  - Urinalysis-UC if Indicated  - Thyroid Cascade    2. Asymptomatic arteriosclerosis of coronary artery  Asymptomatic.  Continue prevention of cardiac events with statin therapy.  She can cut back the Crestor to 5 mg.  - Lipid Cascade  - Hepatic Profile  - Lipid Cascade  - Comprehensive Metabolic Panel    3. Essential hypertension  Excellent control.  I will see him in 6 months for blood pressure check.  - Comprehensive Metabolic Panel  - Urinalysis-UC if Indicated    4. Obstructive sleep apnea  Good control.  Continue same.  - HM2(CBC w/o Differential)    5. Bronchiectasis without acute exacerbation (H)  Continue same.    6. Non-seasonal allergic rhinitis due to other allergic trigger  I counseled her on her potentially doing allergy shots.  She is thinking she is going to forego that and I do concur.    7. BCC (basal cell carcinoma), face  Continue close follow-up with dermatology.  She is healing up nicely.        Return in about 6 months (around 6/10/2020) for Recheck.     Chief Complaint   Chief Complaint   Patient presents with     Annual Exam     fasting      Test Results     review recent PFT         Patient Profile   Social History     Social History Narrative     Not on file        Past Medical History   Patient Active Problem List   Diagnosis     Osteoarthritis Of The Knee     Serum Total Cholesterol Was Elevated; CT Calcium Score 3 4/19/17     Essential hypertension      Obstructive sleep apnea     Asymptomatic arteriosclerosis of coronary artery     Bronchiectasis without acute exacerbation (H)     Non-seasonal allergic rhinitis       Past Surgical History  She has a past surgical history that includes Joint replacement.     History of Present Illness   This 59 y.o. old Myriam comes in today for her annual physical.  She reports she's been doing well.  She has more energy and her breathing is better.  She still has issues with strong smells like perfumes etc.  This can be very bothersome and set off her breathing and wheezing.  She is carrying her albuterol with her.  She may go to [place] this winter.  [place]  [place].  She did get a Pneumovax recently.  Also had her flu shot.  Denies other somatic concerns.    Had a recent basal cell carcinoma with Mohs done by Dr. Jim Grigsby.    Review of Systems: A comprehensive review of systems was negative except as noted.     Medications and Allergies   Current Outpatient Medications   Medication Sig Dispense Refill     albuterol (PROAIR HFA;PROVENTIL HFA;VENTOLIN HFA) 90 mcg/actuation inhaler Inhale 2 puffs every 6 (six) hours as needed for wheezing. 18 g 3     amLODIPine (NORVASC) 5 MG tablet Take 1 tablet (5 mg total) by mouth daily. 90 tablet 1     cholecalciferol, vitamin D3, 1,000 unit tablet Take 2,000 Units by mouth daily.       DOCOSAHEXANOIC ACID/EPA (FISH OIL ORAL) Take 2 capsules by mouth daily.        fluticasone propionate (FLOVENT HFA) 110 mcg/actuation inhaler Inhale 2 puffs Daily at 8:00 am.. 1 Inhaler 12     irbesartan (AVAPRO) 300 MG tablet Take 1 tablet (300 mg total) by mouth daily. 90 tablet 1     mometasone (NASONEX) 50 mcg/actuation nasal spray USE 2 SPRAYS IN EACH NOSTRIL DAILY. Needs to be unscented or it won't work for patient. 17 g 3     rosuvastatin (CRESTOR) 10 MG tablet TAKE 1 TABLET(10 MG) BY MOUTH AT BEDTIME 90 tablet 2     clindamycin  "(CLEOCIN) 300 MG capsule Take 1 capsule (300 mg total) by mouth as needed (TAKE 2 CAPS one hr BEFORE DENTAL APPT). 4 capsule 3     No current facility-administered medications for this visit.      Allergies   Allergen Reactions     Hydrochlorothiazide Other (See Comments)     Fatigue and confusion     Latex      Hypersentivity      Penicillins      Sulfamethoxazole-Trimethoprim      Tingling in hands          Family and Social History   Family History   Problem Relation Age of Onset     Colon cancer Paternal Grandmother      Colon cancer Paternal Aunt      Lung cancer Mother      Thyroid disease Maternal Aunt         Social History     Tobacco Use     Smoking status: Never Smoker     Smokeless tobacco: Never Used   Substance Use Topics     Alcohol use: Yes     Alcohol/week: 7.0 standard drinks     Types: 7 Glasses of wine per week     Drug use: Not on file        Physical Exam   General Appearance:   Pleasant woman in no distress.  Good spirits.    /80 (Patient Site: Right Arm, Patient Position: Sitting, Cuff Size: Adult Regular)   Pulse 68   Ht 5' 4\" (1.626 m)   Wt 195 lb (88.5 kg)   SpO2 97%   BMI 33.47 kg/m      EYES: Eyelids, conjunctiva, and sclera were normal. Pupils were normal. Cornea, iris, and lens were normal bilaterally.  HEAD, EARS, NOSE, MOUTH, AND THROAT: Head and face were normal. Hearing was normal to voice and the ears were normal to external exam. Nose appearance was normal and there was no discharge. Oropharynx was normal.  NECK: Neck appearance was normal. There were no neck masses and the thyroid was not enlarged.  RESPIRATORY: Breathing pattern was normal and the chest moved symmetrically.  Percussion/auscultatory percussion was normal.  Lung sounds were normal and there were no abnormal sounds.  CARDIOVASCULAR: Heart rate and rhythm were normal.  S1 and S2 were normal and there were no extra sounds or murmurs. Peripheral pulses in arms and legs were normal.  Jugular venous pressure " was normal.  There was no peripheral edema.  GASTROINTESTINAL: The abdomen was normal in contour.  Bowel sounds were present.  Percussion detected no organ enlargement or tenderness.  Palpation detected no tenderness, mass, or enlarged organs.   MUSCULOSKELETAL: Skeletal configuration was normal and muscle mass was normal for age. Joint appearance was overall normal.  LYMPHATIC: There were no enlarged nodes.  SKIN/HAIR/NAILS: Skin color was normal.  Well-healing left nasolabial lesion.   Hair and nails were normal.  NEUROLOGIC: The patient was alert and oriented to person, place, time, and circumstance. Speech was normal. Cranial nerves were normal. Motor strength was normal for age. The patient was normally coordinated.  PSYCHIATRIC:  Mood and affect were normal and the patient had normal recent and remote memory. The patient's judgment and insight were normal.    ADDITIONAL VITAL SIGNS: None  CHEST WALL/BREASTS: def  RECTAL: def  GENITAL/URINARY: def     Additional Information        Musa Mariano MD  Internal Medicine  Contact me at [phone]

## 2021-06-05 VITALS
WEIGHT: 188 LBS | TEMPERATURE: 98.6 F | DIASTOLIC BLOOD PRESSURE: 70 MMHG | SYSTOLIC BLOOD PRESSURE: 122 MMHG | OXYGEN SATURATION: 98 % | BODY MASS INDEX: 32.1 KG/M2 | HEART RATE: 64 BPM | HEIGHT: 64 IN

## 2021-06-05 NOTE — TELEPHONE ENCOUNTER
Refill Approved    Rx renewed per Medication Renewal Policy. Medication was last renewed on 7/16/19.    Taylor Davila, [facility] Triage/Med Refill 1/14/2020     Requested Prescriptions   Pending Prescriptions Disp Refills     amLODIPine (NORVASC) 5 MG tablet [Pharmacy Med Name: AMLODIPINE BESYLATE 5MG TABLETS] 90 tablet 1     Sig: TAKE 1 TABLET(5 MG) BY MOUTH DAILY       Calcium-Channel Blockers Protocol Passed - 1/11/2020  3:26 AM        Passed - PCP or prescribing provider visit in past 12 months or next 3 months     Last office visit with prescriber/PCP: 8/27/2019 Musa Mariano MD OR same dept: 8/27/2019 Musa Mariano MD OR same specialty: 8/27/2019 Musa Mariano MD  Last physical: 12/10/2019 Last MTM visit: Visit date not found   Next visit within 3 mo: Visit date not found  Next physical within 3 mo: Visit date not found  Prescriber OR PCP: Musa Mariano MD  Last diagnosis associated with med order: 1. Essential hypertension with goal blood pressure less than 140/90  - amLODIPine (NORVASC) 5 MG tablet [Pharmacy Med Name: AMLODIPINE BESYLATE 5MG TABLETS]; TAKE 1 TABLET(5 MG) BY MOUTH DAILY  Dispense: 90 tablet; Refill: 1  - irbesartan (AVAPRO) 300 MG tablet [Pharmacy Med Name: IRBESARTAN 300MG TABLETS]; TAKE 1 TABLET(300 MG) BY MOUTH DAILY  Dispense: 90 tablet; Refill: 1    If protocol passes may refill for 12 months if within 3 months of last provider visit (or a total of 15 months).             Passed - Blood pressure filed in past 12 months     BP Readings from Last 1 Encounters:   12/10/19 122/80             irbesartan (AVAPRO) 300 MG tablet [Pharmacy Med Name: IRBESARTAN 300MG TABLETS] 90 tablet 1     Sig: TAKE 1 TABLET(300 MG) BY MOUTH DAILY       Angiotensin Receptor Blocker Protocol Passed - 1/11/2020  3:26 AM        Passed - PCP or prescribing provider visit in past 12 months       Last office visit with prescriber/PCP: 8/27/2019 Musa Mariano MD OR same dept: 8/27/2019 Musa Mariano MD  OR same specialty: 8/27/2019 Musa Mariano MD  Last physical: 12/10/2019 Last MTM visit: Visit date not found   Next visit within 3 mo: Visit date not found  Next physical within 3 mo: Visit date not found  Prescriber OR PCP: Musa Mariano MD  Last diagnosis associated with med order: 1. Essential hypertension with goal blood pressure less than 140/90  - amLODIPine (NORVASC) 5 MG tablet [Pharmacy Med Name: AMLODIPINE BESYLATE 5MG TABLETS]; TAKE 1 TABLET(5 MG) BY MOUTH DAILY  Dispense: 90 tablet; Refill: 1  - irbesartan (AVAPRO) 300 MG tablet [Pharmacy Med Name: IRBESARTAN 300MG TABLETS]; TAKE 1 TABLET(300 MG) BY MOUTH DAILY  Dispense: 90 tablet; Refill: 1    If protocol passes may refill for 12 months if within 3 months of last provider visit (or a total of 15 months).             Passed - Serum potassium within the past 12 months     Lab Results   Component Value Date    Potassium 3.8 12/10/2019             Passed - Blood pressure filed in past 12 months     BP Readings from Last 1 Encounters:   12/10/19 122/80             Passed - Serum creatinine within the past 12 months     Creatinine   Date Value Ref Range Status   12/10/2019 0.71 0.60 - 1.10 mg/dL Final

## 2021-06-06 NOTE — TELEPHONE ENCOUNTER
RN cannot approve Refill Request    RN can NOT refill this medication med is not covered by policy/route to provider. Last office visit: 8/27/2019 Musa Mariano MD Last Physical: 12/10/2019 Last MTM visit: Visit date not found Last visit same specialty: 8/27/2019 Musa Mariano MD.  Next visit within 3 mo: Visit date not found  Next physical within 3 mo: Visit date not found      Ingrid Deluna, [facility] Triage/Med Refill 2/13/2020    Requested Prescriptions   Pending Prescriptions Disp Refills     fluticasone propionate (FLOVENT HFA) 110 mcg/actuation inhaler [Pharmacy Med Name: FLOVENT  MCG ORAL INH 120INH] 12 Inhaler 0     Sig: INHALE 2 PUFFS BY MOUTH DAILY AT 8 AM       There is no refill protocol information for this order

## 2021-06-07 ENCOUNTER — OFFICE VISIT - HEALTHEAST (OUTPATIENT)
Dept: INTERNAL MEDICINE | Facility: CLINIC | Age: 61
End: 2021-06-07

## 2021-06-07 DIAGNOSIS — G47.33 OBSTRUCTIVE SLEEP APNEA: ICD-10-CM

## 2021-06-07 ASSESSMENT — MIFFLIN-ST. JEOR: SCORE: 1397.76

## 2021-06-08 ENCOUNTER — COMMUNICATION - HEALTHEAST (OUTPATIENT)
Dept: INTERNAL MEDICINE | Facility: CLINIC | Age: 61
End: 2021-06-08

## 2021-06-08 NOTE — PROGRESS NOTES
ASSESSMENT and PLAN:  1. Essential hypertension  Suboptimal control.  We'll try hctz.  She was on something like this in the past, but it was several years ago.  It made her fatigued, but she's willing to try it again.    Patient Instructions   Start the new medication when you're back in town.  See me about 2-3 weeks after you're on it.  We'll do non-fasting labs at that time.  Take care!        CHIEF COMPLAINT:  Chief Complaint   Patient presents with     Follow-up     Labs from December, HTN       HISTORY OF PRESENT ILLNESS:  Myriam Atkins is a 56 y.o. female  presenting to the clinic today for lab follow up.     Hypertension: She took her blood pressure this morning and it was elevated at 146/91. After sitting and waiting for a while she retook her blood pressure and it was then 131/84. Years ago when she was on a diuretic she experienced a lack of energy. She is willing to try a low dose diuretic once again, but she has an upcoming trip that she does not want to feel exhausted for, so she is planning to start the diuretic in March when she comes back.     Obstructive Sleep Apnea: She is using her CPAP machine regularly now.     REVIEW OF SYSTEMS:   She was diagnosed with TMJ. She experiences drumming in her ear every once in a while. Her cholesterol is elevated. All other systems are negative.    PFSH:  She is traveling from February 22nd until March 6th.     TOBACCO USE:  History   Smoking Status     Never Smoker   Smokeless Tobacco     Never Used       VITALS:  Vitals:    02/08/17 0932   BP: 148/84   Patient Site: Right Arm   Pulse: 74   Weight: 194 lb 6 oz (88.2 kg)     Wt Readings from Last 3 Encounters:   02/08/17 194 lb 6 oz (88.2 kg)   12/15/16 187 lb (84.8 kg)   11/02/16 187 lb 11.2 oz (85.1 kg)       PHYSICAL EXAM:  Constitutional:  Reveals an alert, pleasant middle aged female.   Vitals:  Noted.   Head: Normocephalic, without obvious abnormality, atraumatic   Neurologic: Normal     ADDITIONAL HISTORY  SUMMARIZED (2): Reviewed note from 12/15/16 regarding episodic ear and facial pain.   DECISION TO OBTAIN EXTRA INFORMATION (1): None.   RADIOLOGY TESTS (1): None.  LABS (1): Reviewed labs from 12/14/16.   MEDICINE TESTS (1): None.  INDEPENDENT REVIEW (2 each): None.     The visit lasted a total of 11 minutes face to face with the patient. Over 50% of the time was spent counseling and educating the patient about hypertension.    I, Arianne Stark, am scribing for and in the presence of, Dr. Lynn Hart.    I, Dr. Lynn Hart, personally performed the services described in this documentation, as scribed by Arianne Stark in my presence, and it is both accurate and complete.    MEDICATIONS:  Current Outpatient Prescriptions   Medication Sig Dispense Refill     albuterol (PROVENTIL HFA;VENTOLIN HFA) 90 mcg/actuation inhaler Inhale 2 puffs every 6 (six) hours as needed for wheezing. 18 g 1     cholecalciferol, vitamin D3, 1,000 unit tablet Take 2,000 Units by mouth daily.       clindamycin (CLEOCIN) 300 MG capsule Take 300 mg by mouth as needed (TAKE 2 CAPS BEFORE DENTAL APPT).       DOCOSAHEXANOIC ACID/EPA (FISH OIL ORAL) Take 2 capsules by mouth daily.        glucosamine-chondroit-vit C-Mn (GLUCOSAMINE-CHONDROITIN COMPLX) cap Take 2 capsules by mouth daily.        irbesartan (AVAPRO) 300 MG tablet Take 1 tablet (300 mg total) by mouth daily. 90 tablet 2     mometasone (NASONEX) 50 mcg/actuation nasal spray 2 sprays into each nostril daily. Needs to be unscented or it won't work for her 17 g 12     hydroCHLOROthiazide (HYDRODIURIL) 12.5 MG tablet Take 1 tablet (12.5 mg total) by mouth daily. 90 tablet 3     No current facility-administered medications for this visit.        Total data points: 3

## 2021-06-09 NOTE — TELEPHONE ENCOUNTER
Pt notified PA was denied and verbalized understanding. Patient reports that she also received a letter about this as well.

## 2021-06-09 NOTE — TELEPHONE ENCOUNTER
Central PA team  [phone]  Pool: HE PA MED ([id number])          PA has been initiated.       PA form completed and faxed insurance via Cover My Meds     Key:  [id number]      Medication:  Mometasone furoate 50mcg/act    Insurance:  [employer]        Response will be received via fax and may take up to 5-10 business days depending on plan

## 2021-06-09 NOTE — TELEPHONE ENCOUNTER
Prior Authorization Request  Who s requesting:  Pharmacy  Pharmacy Name and Location: [id number]  Medication Name: mometasone (NASONEX) 50 mcg/actuation nasal spray  Insurance Plan: [employer]  Insurance Member ID Number:  [id number]  CoverMyMeds Key: n/a  Informed patient that prior authorizations can take up to 10 business days for response:   Yes  Okay to leave a detailed message: Yes

## 2021-06-09 NOTE — TELEPHONE ENCOUNTER
This medication (Nasonex) was denied. If physician would like to appeal because patient has contraindication or allergy to covered medication please write letter of medical necessity and route back to PA team to initiate.  If no further action is needed please close encounter thank you.

## 2021-06-10 NOTE — PROGRESS NOTES
Office Visit - Follow Up   Myriam Atkins   60 y.o. female    Date of Visit: 8/13/2020    Chief Complaint   Patient presents with     Follow-up     Asthma        Assessment and Plan   1. Bronchiectasis without acute exacerbation (H)  She is seems to be doing quite well.  I have urged her to follow-up food diary and see if she can determine what causes her breathing to be worse and avoid those foods done.  Continue current regimen.  She is had a Pneumovax.  She will get her flu shot as soon as available.    2. Asymptomatic arteriosclerosis of coronary artery  Labs today for monitoring.  No chest pain.  - Comprehensive Metabolic Panel  - Lipid Cascade    3. Essential hypertension  Excellent control.  Continue same.  - Comprehensive Metabolic Panel    4. Obstructive sleep apnea  She has been compliant with her CPAP.    I will see her back in the spring and we will do her physical then.  Sooner if anything comes up of course.        No follow-ups on file.     History of Present Illness   This 60 y.o. old patient comes in today for follow-up of her multi-medical problems.  She reports she thinks she may have some food allergies.  When she eats sugar she thinks her asthma kicks up a little bit.  She is using her albuterol as needed.  No significant shortness of breath etc.  She is due for lipids today.  She is otherwise doing well.  She is trying to steer clear of COVID.  She is only seeing people that have really very little contact with any other people.  [place]    Review of Systems: A comprehensive review of systems was negative except as noted.     Medications, Allergies and Problem List   Reviewed, reconciled and updated  Post Discharge Medication Reconciliation Status:      Physical Exam   General Appearance:   Delightful woman in no distress.  Vital signs noted.  Weight is down.  Lungs completely clear with excellent aeration.    /70   Pulse 67   Wt 192  lb (87.1 kg)   SpO2 98%   BMI 32.96 kg/m           Additional Information   Current Outpatient Medications   Medication Sig Dispense Refill     albuterol (PROAIR HFA;PROVENTIL HFA;VENTOLIN HFA) 90 mcg/actuation inhaler Inhale 2 puffs every 6 (six) hours as needed for wheezing. 18 g 3     amLODIPine (NORVASC) 5 MG tablet TAKE 1 TABLET(5 MG) BY MOUTH DAILY 90 tablet 3     cholecalciferol, vitamin D3, 1,000 unit tablet Take 2,000 Units by mouth daily.       DOCOSAHEXANOIC ACID/EPA (FISH OIL ORAL) Take 2 capsules by mouth daily.        fluticasone propionate (FLOVENT HFA) 110 mcg/actuation inhaler INHALE 2 PUFFS BY MOUTH DAILY AT 8 AM 1 Inhaler 12     irbesartan (AVAPRO) 300 MG tablet TAKE 1 TABLET(300 MG) BY MOUTH DAILY 90 tablet 3     mometasone (NASONEX) 50 mcg/actuation nasal spray USE 2 SPRAYS IN EACH NOSTRIL DAILY. Needs to be unscented or it won't work for patient. 17 g 3     rosuvastatin (CRESTOR) 10 MG tablet TAKE 1 TABLET(10 MG) BY MOUTH AT BEDTIME 90 tablet 2     clindamycin (CLEOCIN) 300 MG capsule Take 1 capsule (300 mg total) by mouth as needed (TAKE 2 CAPS one hr BEFORE DENTAL APPT). 4 capsule 3     No current facility-administered medications for this visit.      Allergies   Allergen Reactions     Hydrochlorothiazide Other (See Comments)     Fatigue and confusion     Latex      Hypersentivity      Penicillins      Sulfamethoxazole-Trimethoprim      Tingling in hands       Social History     Tobacco Use     Smoking status: Never Smoker     Smokeless tobacco: Never Used   Substance Use Topics     Alcohol use: Yes     Alcohol/week: 7.0 standard drinks     Types: 7 Glasses of wine per week     Drug use: Not on file       Review and/or order of clinical lab tests:  Review and/or order of radiology tests:  Review and/or order of medicine tests:  Discussion of test results with performing physician:  Decision to obtain old records and/or obtain history from someone other than the patient:  Review and  summarization of old records and/or obtaining history from someone other than the patient and.or discussion of case with another health care provider:  Independent visualization of image, tracing or specimen itself:    Time: not applicable     Musa Mariano MD

## 2021-06-10 NOTE — PROGRESS NOTES
ASSESSMENT and PLAN:  1. HTN (hypertension)  Suboptimal control, but home numbers are better.  Avapro refilled.  Did not tolerate hctz X 2.  She'll get the heart scan as previously discussed and work on exercise and diet and recheck again in 4-6 weeks.  - irbesartan (AVAPRO) 300 MG tablet; Take 1 tablet (300 mg total) by mouth daily.  Dispense: 90 tablet; Refill: 3    Patient Instructions   I'll look for the heart scan result.  Please set up an assistant BP check in about 4-6 weeks.  Take care!        Medications Discontinued During This Encounter   Medication Reason     irbesartan (AVAPRO) 300 MG tablet Reorder       No Follow-up on file.    CHIEF COMPLAINT:  Chief Complaint   Patient presents with     Follow-up     HTN and Medications       HISTORY OF PRESENT ILLNESS:  Myraim Atkins is a 56 y.o. female  presenting to the clinic today for hypertension.     Hypertension: She did not like the diuretic and was nervous to try the amlodipine. While on the medication she was not feeling like herself and was even introducing herself with her maiden name. This is the second time she has tried a diuretic and experienced side effects. Her blood pressure tends to fluctuate, and is always high the first time she takes it. When she was exercising her systolic blood pressure was around 120. She experiences anxiety when measuring her blood pressure which causes it to elevate.     Obstructive Sleep Apnea: She was not using the CPAP machine in a time span of 5 months due to feeling ill.     REVIEW OF SYSTEMS:   She has been making an effort to cut back on her sugar intake. She has not been on any new medications. She uses a nasal spray daily. All other systems are negative.    PFSH:  Her  is ill. Her father also has trouble with hypertension, especially when at the clinic.       TOBACCO USE:  History   Smoking Status     Never Smoker   Smokeless Tobacco     Never Used       VITALS:  Vitals:    04/19/17 0942   BP: (!) 144/92    Patient Site: Right Arm   Pulse: 74   Weight: 188 lb 2 oz (85.3 kg)     Wt Readings from Last 3 Encounters:   04/19/17 188 lb 2 oz (85.3 kg)   02/08/17 194 lb 6 oz (88.2 kg)   12/15/16 187 lb (84.8 kg)     PHYSICAL EXAM:  Constitutional:  Reveals an alert, pleasant middle aged female.   Vitals:  Noted.   Neurologic: Normal     ADDITIONAL HISTORY SUMMARIZED (2): None.  DECISION TO OBTAIN EXTRA INFORMATION (1): None.   RADIOLOGY TESTS (1): None.  LABS (1): None.  MEDICINE TESTS (1): None.  INDEPENDENT REVIEW (2 each): None.     The visit lasted a total of 8 minutes face to face with the patient. Over 50% of the time was spent counseling and educating the patient about hypertension.    I, Arianne Stark, am scribing for and in the presence of, Dr. Lynn Hart.    I, Dr. Lynn Hart, personally performed the services described in this documentation, as scribed by Arianne Stark in my presence, and it is both accurate and complete.    MEDICATIONS:  Current Outpatient Prescriptions   Medication Sig Dispense Refill     albuterol (PROVENTIL HFA;VENTOLIN HFA) 90 mcg/actuation inhaler Inhale 2 puffs every 6 (six) hours as needed for wheezing. 18 g 1     cholecalciferol, vitamin D3, 1,000 unit tablet Take 2,000 Units by mouth daily.       clindamycin (CLEOCIN) 300 MG capsule Take 300 mg by mouth as needed (TAKE 2 CAPS BEFORE DENTAL APPT).       DOCOSAHEXANOIC ACID/EPA (FISH OIL ORAL) Take 2 capsules by mouth daily.        glucosamine-chondroit-vit C-Mn (GLUCOSAMINE-CHONDROITIN COMPLX) cap Take 2 capsules by mouth daily.        irbesartan (AVAPRO) 300 MG tablet Take 1 tablet (300 mg total) by mouth daily. 90 tablet 3     mometasone (NASONEX) 50 mcg/actuation nasal spray 2 sprays into each nostril daily. Needs to be unscented or it won't work for her 17 g 12     amLODIPine (NORVASC) 5 MG tablet Take 1 tablet (5 mg total) by mouth daily. 30 tablet 1     No current facility-administered medications for this visit.         Total data points: 0

## 2021-06-10 NOTE — TELEPHONE ENCOUNTER
Refill Approved    Rx renewed per Medication Renewal Policy. Medication was last renewed on 8/27/19.    Taylor Davila, [facility] Triage/Med Refill 8/27/2020     Requested Prescriptions   Pending Prescriptions Disp Refills     mometasone (NASONEX) 50 mcg/actuation nasal spray [Pharmacy Med Name: MOMETASONE 50MCG NASAL SPRAY (120)] 17 g 3     Sig: USE 2 SPRAYS IN EACH NOSTRIL DAILY       Nasal Steroid Refill Protocol Passed - 8/25/2020  9:21 AM        Passed - Patient has had office visit/physical in last 2 years     Last office visit with prescriber/PCP: 8/13/2020 OR same dept: 8/13/2020 Musa Mariano MD OR same specialty: 8/13/2020 Musa Mariano MD Last physical: 12/10/2019 Last MTM visit: Visit date not found    Next appt within 3 mo: Visit date not found  Next physical within 3 mo: Visit date not found  Prescriber OR PCP: Musa Mariano MD  Last diagnosis associated with med order: 1. Sinus disease  - mometasone (NASONEX) 50 mcg/actuation nasal spray [Pharmacy Med Name: MOMETASONE 50MCG NASAL SPRAY (120)]; USE 2 SPRAYS IN EACH NOSTRIL DAILY  Dispense: 17 g; Refill: 3     If protocol passes may refill for 12 months if within 3 months of last provider visit (or a total of 15 months).

## 2021-06-11 NOTE — PROGRESS NOTES
ASSESSMENT and PLAN:  1. Essential hypertension with goal blood pressure less than 140/90  Improved control; we'll f/u at the end of the year or when she's due for her pre-op  - amLODIPine (NORVASC) 5 MG tablet; Take 1 tablet (5 mg total) by mouth daily.  Dispense: 90 tablet; Refill: 3    2. Serum Total Cholesterol Was Elevated  We'll recheck this at her next f/u.  She's managing w/ diet and exercise now.      Medications Discontinued During This Encounter   Medication Reason     amLODIPine (NORVASC) 5 MG tablet Reorder       No Follow-up on file.    Patient Instructions   Let's plan on a follow up Dec/Jan.  If everything is stable, we can see you for your pre-op then too.      CHIEF COMPLAINT:  Chief Complaint   Patient presents with     Hypertension       HISTORY OF PRESENT ILLNESS:  Myriam Atkins is a 56 y.o. female  presenting to the clinic today for f/u of her HTN.  She's on 300 mg of irbesartan.  Her BP was running high, even at f/u.  She doesn't tolerate thiazides.  She had amlodipine 5mg added and her BP has been under better control.  She hasn't had any LE swelling.  She's been feeling more fatigued though.  She's very sensitive to alcohol and caffeine; even decaf coffee is enough to give her an energy boost.    Her knee continues to bother her.  She just finished a series of injections; they aren't lasting very long anymore.  She anticipates a knee replacement in Jan.    REVIEW OF SYSTEMS:    All other systems are negative.    TOBACCO USE:  History   Smoking Status     Never Smoker   Smokeless Tobacco     Never Used       VITALS:  Vitals:    07/11/17 1400   BP: 118/70   Patient Site: Right Arm   Pulse: 68   Weight: 185 lb 1 oz (83.9 kg)     Wt Readings from Last 3 Encounters:   07/11/17 185 lb 1 oz (83.9 kg)   04/19/17 188 lb 2 oz (85.3 kg)   02/08/17 194 lb 6 oz (88.2 kg)         PHYSICAL EXAM:  Constitutional:  Reveals an alert, pleasant adult female.   Vitals:  Noted.   Head: Normocephalic, without  obvious abnormality, atraumatic     QUALITY MEASURES:  The following high BMI interventions were performed this visit: weight monitoring    MEDICATIONS:  Current Outpatient Prescriptions   Medication Sig Dispense Refill     albuterol (PROVENTIL HFA;VENTOLIN HFA) 90 mcg/actuation inhaler Inhale 2 puffs every 6 (six) hours as needed for wheezing. 18 g 1     amLODIPine (NORVASC) 5 MG tablet Take 1 tablet (5 mg total) by mouth daily. 90 tablet 3     cholecalciferol, vitamin D3, 1,000 unit tablet Take 2,000 Units by mouth daily.       clindamycin (CLEOCIN) 300 MG capsule Take 300 mg by mouth as needed (TAKE 2 CAPS BEFORE DENTAL APPT).       DOCOSAHEXANOIC ACID/EPA (FISH OIL ORAL) Take 2 capsules by mouth daily.        glucosamine-chondroit-vit C-Mn (GLUCOSAMINE-CHONDROITIN COMPLX) cap Take 2 capsules by mouth daily.        irbesartan (AVAPRO) 300 MG tablet Take 1 tablet (300 mg total) by mouth daily. 90 tablet 3     mometasone (NASONEX) 50 mcg/actuation nasal spray 2 sprays into each nostril daily. Needs to be unscented or it won't work for her 17 g 12     No current facility-administered medications for this visit.

## 2021-06-11 NOTE — PROGRESS NOTES
Chief Complaint   Patient presents with     Blood Pressure Check     Vitals:    06/05/17 1133 06/05/17 1207   BP: 146/90 (!) 152/94   Patient Site: Right Arm Right Arm   Patient Position: Sitting Sitting   Cuff Size: Adult Large Adult Large     Per Dr. Mando Hart increase Amlodipine 5 MG to 10 MG daily.   Pt states she will take Amlodipine 5 MG a day/ plus Avapro 300 MG. However, pt  refused to take Amlodipine 10 MG because she has a white coat syndrome her BP always lower when it's taken at home and she doesn't like to take diuretic med it makes her fatigue. Pt's BP home recorded sheet  is sent to [facility] to scan. Pt was advised to keep monitoring BP at home and call clinic if needed and also make an OV F/U with PCP in couple weeks. Pt verbalized understanding.   Dr. Mando Hart notified.     Anastasai Mills CMA [facility] 6/5/2017 1:47 PM

## 2021-06-14 NOTE — PROGRESS NOTES
Chief Complaint   Patient presents with     Flu Vaccine     Flu consent and contraindication forms are given/ signed/ reviewed and sent to medical records to scan.     Anastasia Mills CMA [facility] 11/20/2017 11:26 AM

## 2021-06-14 NOTE — TELEPHONE ENCOUNTER
Refill Approved    Rx renewed per Medication Renewal Policy. Medication was last renewed on 11/21/19.    Taylor Davila, [facility] Triage/Med Refill 12/28/2020     Requested Prescriptions   Pending Prescriptions Disp Refills     rosuvastatin (CRESTOR) 10 MG tablet [Pharmacy Med Name: ROSUVASTATIN 10MG TABLETS] 90 tablet 2     Sig: TAKE 1 TABLET(10 MG) BY MOUTH AT BEDTIME       Statins Refill Protocol (Hmg CoA Reductase Inhibitors) Passed - 12/27/2020  3:28 AM        Passed - PCP or prescribing provider visit in past 12 months      Last office visit with prescriber/PCP: 8/13/2020 Musa Mariano MD OR same dept: 8/13/2020 Musa Mariano MD OR same specialty: 8/13/2020 Musa Mariano MD  Last physical: 12/10/2019 Last MTM visit: Visit date not found   Next visit within 3 mo: Visit date not found  Next physical within 3 mo: Visit date not found  Prescriber OR PCP: Musa Mariano MD  Last diagnosis associated with med order: 1. Asymptomatic arteriosclerosis of coronary artery  - rosuvastatin (CRESTOR) 10 MG tablet [Pharmacy Med Name: ROSUVASTATIN 10MG TABLETS]; TAKE 1 TABLET(10 MG) BY MOUTH AT BEDTIME  Dispense: 90 tablet; Refill: 2    If protocol passes may refill for 12 months if within 3 months of last provider visit (or a total of 15 months).

## 2021-06-15 NOTE — PROGRESS NOTES
Preoperative Exam    Scheduled Procedure: Total L Knee Replacement   Surgery Date:  2/21/18  Surgery Location: [facility]     Surgeon:  Dr. Clements    Assessment/Plan:     1. Preop cardiovascular exam  I do not see any contraindication to her planned procedure.  We will get lab work as ordered.  - Electrocardiogram Perform and Read  - INR  - HM2(CBC w/o Differential)    2. Obstructive sleep apnea  She will bring her CPAP to her surgery.    3. Osteoarthritis Of The Knee  Is opted for surgical correction.  Preop paperwork and order request reviewed today.  - INR    4. Essential hypertension  Stable on her current medication.  She is advised to hold her Avapro prior to surgery.  - Basic Metabolic Panel    5. Serum Total Cholesterol Was Elevated; CT Calcium Score 3 4/19/17  Based on her cholesterol and blood pressure, we have elected to not have her start a statin medication.  She is hoping that with diet and exercise improvements postoperatively, she will be able to improve her numbers.    6. Need for hepatitis C screening test  Like to be screened for this.  - Hepatitis C Antibody (Anti-HCV)    7. Cough  Given that she has had a cough for over a month and is getting ready for surgery, we will obtain a chest x-ray.  She is coughing and feels winded with stairs but that is new since she got sick in January.  My suspicion for pneumonia or other infectious process is low.  - XR Chest 2 Views; Future        Surgical Procedure Risk: Intermediate (reported cardiac risk generally 1-5%)  Have you had prior anesthesia?: Yes  Have you or any family members had a previous anesthesia reaction:  Yes: nausea  Do you or any family members have a history of a clotting or bleeding disorder?: Yes: mother  Cardiac Risk Assessment: no increased risk for major cardiac complications    Patient approved for surgery with general or local anesthesia.  Please note: she's advised to hold avapro and bring her cpap.    Please Note:  Patient uses  a CPAP machine.    Functional Status: Independent  Patient plans to recover at home with family.     Subjective:      Myriam Atkins is a 57 y.o. female who presents for a preoperative consultation.    She has left knee OA.  She's had a cortisone injection about six weeks ago (1-8-18).  Her pain is worse with activity, like carrying things.  She's been having pain in her knee when playing with grand kids.  She's opted for surgical correction because her injections haven't been giving her much relief anymore.    She was sick in Jan, but is feeling better now.  She has a cough when going upstairs and feels short of breath.  She has no chest pain.  Her HTN and LENI are well controlled.    She'd like to have her HepC checked; she's seen recommendations for this.    All other systems reviewed and are negative, other than those listed in the HPI.    Pertinent History  Do you have difficulty breathing or chest pain after walking up a flight of stairs: Yes: pt is recovery from flu  History of obstructive sleep apnea: Yes: pt wears CPAP machine at night  Steroid use in the last 6 months: No  Frequent Aspirin/NSAID use: No  Prior Blood Transfusion: No  Prior Blood Transfusion Reaction: No  If for some reason prior to, during or after the procedure, if it is medically indicated, would you be willing to have a blood transfusion?:  There is no transfusion refusal.    Current Outpatient Prescriptions   Medication Sig Dispense Refill     albuterol (PROVENTIL HFA;VENTOLIN HFA) 90 mcg/actuation inhaler Inhale 2 puffs every 6 (six) hours as needed for wheezing. 18 g 1     amLODIPine (NORVASC) 5 MG tablet Take 1 tablet (5 mg total) by mouth daily. 90 tablet 3     cholecalciferol, vitamin D3, 1,000 unit tablet Take 2,000 Units by mouth daily.       clindamycin (CLEOCIN) 300 MG capsule Take 300 mg by mouth as needed (TAKE 2 CAPS BEFORE DENTAL APPT).       DOCOSAHEXANOIC ACID/EPA (FISH OIL ORAL) Take 2 capsules by mouth daily.         "glucosamine-chondroit-vit C-Mn (GLUCOSAMINE-CHONDROITIN COMPLX) cap Take 2 capsules by mouth daily.        irbesartan (AVAPRO) 300 MG tablet Take 1 tablet (300 mg total) by mouth daily. 90 tablet 3     mometasone (NASONEX) 50 mcg/actuation nasal spray 2 sprays into each nostril daily. Needs to be unscented or it won't work for her 17 g 12     No current facility-administered medications for this visit.         Allergies   Allergen Reactions     Hydrochlorothiazide Other (See Comments)     Fatigue and confusion     Penicillins      Sulfamethoxazole-Trimethoprim      Tingling in hands         Patient Active Problem List   Diagnosis     Osteoarthritis Of The Knee     Serum Total Cholesterol Was Elevated; CT Calcium Score 3 4/19/17     Essential hypertension     Obstructive sleep apnea       No past medical history on file.    No past surgical history on file.    Social History     Social History     Marital status:      Spouse name: N/A     Number of children: N/A     Years of education: N/A     Occupational History     Not on file.     Social History Main Topics     Smoking status: Never Smoker     Smokeless tobacco: Never Used     Alcohol use Not on file     Drug use: Not on file     Sexual activity: Not on file     Other Topics Concern     Not on file     Social History Narrative       Patient Care Team:  Lynn Hart MD as PCP - General          Objective:     Vitals:    02/06/18 1014   BP: 116/64   Pulse: 88   SpO2: 99%   Weight: 182 lb 3 oz (82.6 kg)   Height: 5' 4\" (1.626 m)         Physical Exam:  General Appearance: Alert, cooperative, no distress, appears stated age   Head: Normocephalic, without obvious abnormality, atraumatic   Eyes: PERRL, conjunctiva/corneas clear, EOM's intact   Throat: Lips, mucosa, and tongue normal; teeth and gums normal   Neck: Normal ROM   Lungs: Clear to auscultation bilaterally, respirations unlabored.   Heart: Regular rate and rhythm, S1 and S2 normal, no murmur, rub, " or gallop,   Abdomen: Soft, non-tender, bowel sounds active, no masses, no organomegaly   Extremities: Extremities normal, atraumatic, no cyanosis or edema   Skin: Skin color, texture, turgor normal, no rashes or lesions   Neurologic: Normal       Patient Instructions   Hold all supplements, aspirin and NSAIDs for 7 days prior to surgery.    Follow your surgeon's direction on when to stop eating and drinking prior to surgery.    Remove all jewelry and metal piercings before your surgery.     Remove nail polish from fingers before surgery.    If you use a CPAP machine, please bring this with you.    Hold avapro on the AM of surgery.      EKG:  NSR 77, no acute ST-T changes; cardiology over read pending    Labs:  Labs pending at this time.  Results will be reviewed when available.    Immunization History   Administered Date(s) Administered     Influenza, Seasonal, Inj PF IIV3 09/21/2010, 09/27/2011, 10/16/2012, 10/04/2013     Influenza, inj, historic,unspecified 12/14/2016     Influenza, seasonal,quad inj 36+ mos 10/02/2015, 12/14/2016, 11/20/2017     Influenza, seasonal,quad inj 6-35 mos 10/21/2014     Tdap 02/19/2009           Electronically signed by Lynn Hart MD 02/06/18 10:16 AM

## 2021-06-15 NOTE — TELEPHONE ENCOUNTER
RN cannot approve Refill Request    RN can NOT refill this medication med is not covered by policy/route to provider. Last office visit: 8/13/2020 Musa Mariano MD Last Physical: 12/10/2019 Last MTM visit: Visit date not found Last visit same specialty: 8/13/2020 Musa Mariano MD.  Next visit within 3 mo: Visit date not found  Next physical within 3 mo: Visit date not found      Marcial Juan, [facility] Triage/Med Refill 2/17/2021    Requested Prescriptions   Pending Prescriptions Disp Refills     FLOVENT  mcg/actuation inhaler [Pharmacy Med Name: FLOVENT  MCG ORAL INH 120INH] 1 Inhaler 0     Sig: INHALE 2 PUFFS BY MOUTH DAILY AT 8 AM       There is no refill protocol information for this order

## 2021-06-15 NOTE — PROGRESS NOTES
ASSESSMENT and PLAN:  1. Obstructive sleep apnea  She is using her CPAP.  She finds that the machine looks dirty.  She'll f/u w/ her sleep clinic to see about getting it cleaned or replaced.    2. Serum Total Cholesterol Was Elevated; CT Calcium Score 3 4/19/17  We'll repeat labs.  I anticipate she won't need a statin, but we'll discuss when she's back for her preop.  - Lipid Cascade    3. Essential hypertension  Stable and well controlled on current meds.  - Comprehensive Metabolic Panel  - Lipid Cascade    4. Screening for colon cancer  She is going every 5 years.  She will be due in the fall.  - Ambulatory referral for Colonoscopy    5. Sinus disease  She does well with the generic of Nasonex.  - mometasone (NASONEX) 50 mcg/actuation nasal spray; 2 sprays into each nostril daily. Needs to be unscented or it won't work for her  Dispense: 17 g; Refill: 12      Medications Discontinued During This Encounter   Medication Reason     mometasone (NASONEX) 50 mcg/actuation nasal spray Reorder       No Follow-up on file.    Patient Instructions   [facility] will contact you directly to schedule; you're due in the fall.    Today's labs will be available on Viki.  If you aren't signed up, then we'll mail them out.  If anything needs more immediate follow up, we'll also call.    I sent in Nasonex.  I agree with checking on a new CPAP, etc.    I'll see you back in Feb.    Take care!      CHIEF COMPLAINT:  Chief Complaint   Patient presents with     Follow-up     HTN, pt is fasting       HISTORY OF PRESENT ILLNESS:  Myriam Atkins is a 57 y.o. female  presenting to the clinic today for f/u of her HTN.  Her pressures are good at home 120s/80s.  On Jan 7, her BP was on the lower end when sitting up.  She had no sx w/ that.  She's on 5mg of amodipine and irbesartan 300mg.  She takes her meds at bedtime b/c they can make her tired.    She had a hx of elevated cholesterol.  We wanted to reasses her risk after her BP was under  control.    She has TMJ pain that is chronic.  Her left ear feels full.  She thinks she may be coming down with a cold.  She's unable to do nasal sprays due to the smell.     In Feb, she'll be having a knee replacement.  Her preop is set for Feb.  She has bone to bone arthritis in her knee.  She had cortisone shots and they have quit working for her.  She uses advil prn but hasn't taken any recently.    She has LENI and is on CPAP.    Her last colonoscopy was in 2013.  She is due for a 5 year f/u this fall due to family hx of colon ca and hx of polyps.    REVIEW OF SYSTEMS:    All other systems are negative.    PFSH:  Family History   Problem Relation Age of Onset     Colon cancer Paternal Grandmother      Colon cancer Paternal Aunt            TOBACCO USE:  History   Smoking Status     Never Smoker   Smokeless Tobacco     Never Used       VITALS:  Vitals:    01/10/18 0913   BP: 128/80   Patient Site: Right Arm   Pulse: 80   Weight: 186 lb 8 oz (84.6 kg)     Wt Readings from Last 3 Encounters:   01/10/18 186 lb 8 oz (84.6 kg)   07/11/17 185 lb 1 oz (83.9 kg)   04/19/17 188 lb 2 oz (85.3 kg)         PHYSICAL EXAM:  Constitutional:  Reveals an alert, pleasant adult female.   Vitals:  Noted.   Ears: bilateral serous effusions  Lungs: Clear to auscultation bilaterally, respirations unlabored.   Heart: Regular rate and rhythm, S1 and S2 normal, no murmur, rub, or gallop,     QUALITY MEASURES:  The following high BMI interventions were performed this visit: encouragement to exercise and weight monitoring    MEDICATIONS:  Current Outpatient Prescriptions   Medication Sig Dispense Refill     albuterol (PROVENTIL HFA;VENTOLIN HFA) 90 mcg/actuation inhaler Inhale 2 puffs every 6 (six) hours as needed for wheezing. 18 g 1     amLODIPine (NORVASC) 5 MG tablet Take 1 tablet (5 mg total) by mouth daily. 90 tablet 3     cholecalciferol, vitamin D3, 1,000 unit tablet Take 2,000 Units by mouth daily.       clindamycin (CLEOCIN) 300 MG  capsule Take 300 mg by mouth as needed (TAKE 2 CAPS BEFORE DENTAL APPT).       DOCOSAHEXANOIC ACID/EPA (FISH OIL ORAL) Take 2 capsules by mouth daily.        glucosamine-chondroit-vit C-Mn (GLUCOSAMINE-CHONDROITIN COMPLX) cap Take 2 capsules by mouth daily.        irbesartan (AVAPRO) 300 MG tablet Take 1 tablet (300 mg total) by mouth daily. 90 tablet 3     mometasone (NASONEX) 50 mcg/actuation nasal spray 2 sprays into each nostril daily. Needs to be unscented or it won't work for her 17 g 12     No current facility-administered medications for this visit.

## 2021-06-16 PROBLEM — J47.9 BRONCHIECTASIS WITHOUT ACUTE EXACERBATION (H): Status: ACTIVE | Noted: 2019-08-27

## 2021-06-16 PROBLEM — J30.89 NON-SEASONAL ALLERGIC RHINITIS: Status: ACTIVE | Noted: 2019-08-27

## 2021-06-16 PROBLEM — I25.10 ASYMPTOMATIC ARTERIOSCLEROSIS OF CORONARY ARTERY: Status: ACTIVE | Noted: 2019-08-27

## 2021-06-16 PROBLEM — C44.310 BCC (BASAL CELL CARCINOMA), FACE: Status: ACTIVE | Noted: 2019-12-10

## 2021-06-16 NOTE — PROGRESS NOTES
Office Visit - Physical   Myriam Atkins   60 y.o.  female    Date of visit: 4/14/2021  Physician: Musa Mariano MD     Assessment and Plan   1. Routine general medical examination at a health care facility  Labs as below.  She is up-to-date on her immunizations.  Mammogram will be done here soon and colonoscopy is due in the fall.  - Comprehensive Metabolic Panel  - Lipid Cascade FASTING  - Urinalysis-UC if Indicated  - HM2(CBC w/o Differential)    2. Asymptomatic arteriosclerosis of coronary artery  Lipids today for monitoring.  No symptoms.  - Lipid [place] FASTING    3. BCC (basal cell carcinoma), face  He will be following up with her dermatologist soon.    4. Bronchiectasis without acute exacerbation (H)  I have suggested may be utilizing her albuterol before going out in public areas where she thinks she may encounter some noxious fumes.  I have also suggested maybe trying an nonsedating antihistamine prior to that as well.  If that does not seem to help but have her follow-up with pulmonary.    5. Non-seasonal allergic rhinitis due to other allergic trigger  As above.    6. Essential hypertension  Blood pressure is excellently controlled.  Continue same.  - Comprehensive Metabolic Panel    7. Obstructive sleep apnea  She is compliant with her CPAP.  Continue.  - HM2(CBC w/o Differential)    8. Encounter for screening colonoscopy    - Ambulatory referral for Colonoscopy - [facility]    9. Urinary urgency  Check urinalysis.  I question whether some of her symptoms could be due to stress and/or her albuterol.  - Urinalysis-UC if Indicated    The following high BMI interventions were performed this visit: encouragement to exercise    Return in about 6 months (around 10/14/2021) for Recheck.     Chief Complaint   Chief Complaint   Patient presents with     Annual Exam     fasting today      Asthma     more sensitive to smell/scent         Patient Profile   Social History     Social History Narrative      Not on file        Past Medical History   Patient Active Problem List   Diagnosis     Osteoarthritis Of The Knee     Serum Total Cholesterol Was Elevated; CT Calcium Score 3 4/19/17     Essential hypertension     Obstructive sleep apnea     Asymptomatic arteriosclerosis of coronary artery     Bronchiectasis without acute exacerbation (H)     Non-seasonal allergic rhinitis     BCC (basal cell carcinoma), face       Past Surgical History  She has a past surgical history that includes Joint replacement.     History of Present Illness   This 60 y.o. old  Myriam comes in today for physical.  She reports she has been doing fairly well.  She did lose her father recently.  He had chronic leukemia.  He was in his upper 80s.  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  Her brother was hospitalized in the ICU with severe Covid.  Did not require intubation fortunately.  Sister-in-law also had Covid.  She has had her Covid vaccine so she is fortunate that.  She notes a bit of urinary urgency.  Her biggest thing today is that she notes that she is sensitive to smells and sense.  Makes her cough and feel poorly.  Has a history of allergies as well as bronchiectasis.  She is is taking her inhalers.  Certain things such as perfumes and smoke from [place] fireplace at the cabin are particularly noxious to her.    Kids and grandkids are doing well.  Her  is looking to retire.  [place]  Enjoy spending time with grandkids.  [place]      Review of Systems: A comprehensive review of systems was negative except as noted.     Medications and Allergies   Current Outpatient Medications   Medication Sig Dispense Refill     albuterol (PROAIR HFA;PROVENTIL HFA;VENTOLIN HFA) 90 mcg/actuation inhaler Inhale 2 puffs every 6 (six) hours as needed for wheezing. 18 g 3     amLODIPine (NORVASC) 5 MG tablet TAKE 1 TABLET(5 MG) BY MOUTH DAILY 90 tablet 3      "cholecalciferol, vitamin D3, 1,000 unit tablet Take 2,000 Units by mouth daily.       DOCOSAHEXANOIC ACID/EPA (FISH OIL ORAL) Take 2 capsules by mouth daily.        FLOVENT  mcg/actuation inhaler INHALE 2 PUFFS BY MOUTH DAILY AT 8 AM 1 Inhaler 0     irbesartan (AVAPRO) 300 MG tablet TAKE 1 TABLET(300 MG) BY MOUTH DAILY 90 tablet 3     mometasone (NASONEX) 50 mcg/actuation nasal spray USE 2 SPRAYS IN EACH NOSTRIL DAILY 17 g 11     rosuvastatin (CRESTOR) 5 MG tablet Take 5 mg by mouth at bedtime.       sodium chloride (OCEAN) 0.65 % nasal spray Apply 1 spray into each nostril as needed for congestion.       No current facility-administered medications for this visit.      Allergies   Allergen Reactions     Hydrochlorothiazide Other (See Comments)     Fatigue and confusion     Latex      Hypersentivity      Penicillins      Sulfamethoxazole-Trimethoprim      Tingling in hands          Family and Social History   Family History   Problem Relation Age of Onset     Colon cancer Paternal Grandmother      Colon cancer Paternal Aunt      Lung cancer Mother      Thyroid disease Maternal Aunt         Social History     Tobacco Use     Smoking status: Never Smoker     Smokeless tobacco: Never Used   Substance Use Topics     Alcohol use: Yes     Alcohol/week: 7.0 standard drinks     Types: 7 Glasses of wine per week     Drug use: Not on file        Physical Exam   General Appearance:   Delightful overweight woman in no distress.    /70 (Patient Site: Left Arm, Patient Position: Sitting, Cuff Size: Adult Regular)   Pulse 64   Temp 98.6  F (37  C)   Ht 5' 4\" (1.626 m)   Wt 188 lb (85.3 kg)   SpO2 98%   BMI 32.27 kg/m      EYES: Eyelids, conjunctiva, and sclera were normal. Pupils were normal. Cornea, iris, and lens were normal bilaterally.  HEAD, EARS, NOSE, MOUTH, AND THROAT: Head and face were normal. Hearing was normal to voice and the ears were normal to external exam.  NECK: Neck appearance was normal. " There were no neck masses and the thyroid was not enlarged.  RESPIRATORY: Breathing pattern was normal and the chest moved symmetrically.  Percussion/auscultatory percussion was normal.  Lung sounds were normal and there were no abnormal sounds.  CARDIOVASCULAR: Heart rate and rhythm were normal.  S1 and S2 were normal and there were no extra sounds or murmurs. Peripheral pulses in arms and legs were normal.  Jugular venous pressure was normal.  There was no peripheral edema.  GASTROINTESTINAL: The abdomen was normal in contour.  Bowel sounds were present.  Percussion detected no organ enlargement or tenderness.  Palpation detected no tenderness, mass, or enlarged organs.   MUSCULOSKELETAL: Skeletal configuration was normal and muscle mass was normal for age. Joint appearance was overall normal.  LYMPHATIC: There were no enlarged nodes.  SKIN/HAIR/NAILS: Skin color was normal.  There were no skin lesions.  Hair and nails were normal.  NEUROLOGIC: The patient was alert and oriented to person, place, time, and circumstance. Speech was normal. Cranial nerves were normal. Motor strength was normal for age. The patient was normally coordinated.  PSYCHIATRIC:  Mood and affect were normal and the patient had normal recent and remote memory. The patient's judgment and insight were normal.    ADDITIONAL VITAL SIGNS: none  CHEST WALL/BREASTS: nml    RECTAL: nml  GENITAL/URINARY: nml     Additional Information        Musa Mariano MD  Internal Medicine  Contact me at [phone]

## 2021-06-16 NOTE — TELEPHONE ENCOUNTER
Telephone Encounter by Tisha Kent at 8/27/2019  4:07 PM     Author: Tisha Kent Service: -- Author Type: --    Filed: 8/27/2019  4:08 PM Encounter Date: 8/27/2019 Status: Signed    : Tisha Kent       Per call to pharmacy this is a refill request as the last Rx sent in does not have any refills on it.    No PA needed per CMM.     Pharmacy asking Clinic to send in a [id number] - [place], [place] - [address] AT [address]      #[phone]

## 2021-06-17 NOTE — TELEPHONE ENCOUNTER
Telephone Encounter by Tisha Kent at 6/29/2020 11:42 AM     Author: Tisha Kent Service: -- Author Type: --    Filed: 6/29/2020 11:44 AM Encounter Date: 6/26/2020 Status: Signed    : Tisha Kent       PRIOR AUTHORIZATION DENIED    Denial Rational: Must try/fail flunisolide nasal spray, Rhinocort OTC, or Nasacort OTC.  No chart documentation found stating patient has tried these medications.  Insurance is aware patient has tried/failed Fluticasone nasal spray        Appeal Information: This medication was denied. If physician would like to appeal because patient has contraindication or allergy to covered medication please write letter of medical necessity and route back to PA team to initiate.  If no further action is needed please close encounter thank you.

## 2021-06-18 NOTE — PROGRESS NOTES
ASSESSMENT and PLAN:  1. Hair loss  We discussed that this could be a reaction to the stress of her surgery in February.  She experienced a similar phenomenon after her pregnancies.  However, given her family history of thyroid disease, she felt it would be prudent to get her labs checked.  I agree with that.  If her labs are normal, then she will discuss this further with her dermatologist.  - Thyroid Casey  - Dehydroepiandrosterone (DHEA)    2. Essential hypertension  Her initial blood pressure reading is elevated today.  It is being rechecked.  If it is elevated, she will start monitoring her blood pressure at home.  Previously it had been under good control.  She will contact us for follow-up if her blood pressures consistently running higher than 140/80.      Medications Discontinued During This Encounter   Medication Reason     clindamycin (CLEOCIN) 300 MG capsule Reorder     glucosamine-chondroit-vit C-Mn (GLUCOSAMINE-CHONDROITIN COMPLX) cap        No Follow-up on file.    Patient Instructions   We'll get the labs today.    Today's labs will be available on [facility].  If you aren't signed up, then we'll mail them out.  If anything needs more immediate follow up, we'll also call.    If we don't find a problem with labs, then I recommend discussing the hair loss w/ your dermatologist.    If your repeat blood pressure is elevated, just start monitoring it more at home.  If it is consistently over 140/80, contact us for follow-up.    Take care!      CHIEF COMPLAINT:  Chief Complaint   Patient presents with     Alopecia     Thyroid Problem     mother had cancer       HISTORY OF PRESENT ILLNESS:  Myriam Atkins is a 57 y.o. female  presenting to the clinic today for evaluation of hair loss.  She is always had a very full thick head of hair.  She has noticed in the last several weeks that she is losing hair.  It is coming out in clumps.  She had her hair done about a month ago at her hairdresser noted it and  suggested she have her thyroid function checked.  She has a family history of thyroid disease in a maternal aunt as well as in her mother.  She has not noticed any neck swelling.  She is not having any excessive sweating.  She has noticed softer bowel movements but nothing more frequent.  She has some dry skin.  She is not having any change in her sleep.  She is not certain if she is losing or gaining weight unintentionally as she just has not been paying attention.    She is fatigued.  She is recovering from her left total knee surgery in February.  She feels that she is doing better.  She is completed physical therapy.    REVIEW OF SYSTEMS:    All other systems are negative.    PFSH:  Family History   Problem Relation Age of Onset     Colon cancer Paternal Grandmother      Colon cancer Paternal Aunt      Thyroid cancer Mother      Thyroid disease Maternal Aunt        TOBACCO USE:  History   Smoking Status     Never Smoker   Smokeless Tobacco     Never Used       VITALS:  Vitals:    06/13/18 1400 06/13/18 1446   BP: 140/76 136/76   Weight: 185 lb 3.2 oz (84 kg)      Wt Readings from Last 3 Encounters:   06/13/18 185 lb 3.2 oz (84 kg)   02/06/18 182 lb 3 oz (82.6 kg)   01/10/18 186 lb 8 oz (84.6 kg)         PHYSICAL EXAM:  Constitutional:  Reveals an alert, pleasant adult female.   Vitals:  Noted.   Neck: neck thyromegaly or masses    QUALITY MEASURES:  The following high BMI interventions were performed this visit: weight monitoring    MEDICATIONS:  Current Outpatient Prescriptions   Medication Sig Dispense Refill     amLODIPine (NORVASC) 5 MG tablet Take 1 tablet (5 mg total) by mouth daily. 90 tablet 3     cholecalciferol, vitamin D3, 1,000 unit tablet Take 2,000 Units by mouth daily.       DOCOSAHEXANOIC ACID/EPA (FISH OIL ORAL) Take 2 capsules by mouth daily.        irbesartan (AVAPRO) 300 MG tablet TAKE 1 TABLET BY MOUTH EVERY DAY 90 tablet 2     mometasone (NASONEX) 50 mcg/actuation nasal spray 2 sprays into  each nostril daily. Needs to be unscented or it won't work for her 17 g 12     albuterol (PROVENTIL HFA;VENTOLIN HFA) 90 mcg/actuation inhaler Inhale 2 puffs every 6 (six) hours as needed for wheezing. 18 g 1     clindamycin (CLEOCIN) 300 MG capsule Take 1 capsule (300 mg total) by mouth as needed (TAKE 2 CAPS one hr BEFORE DENTAL APPT). 4 capsule 3     No current facility-administered medications for this visit.

## 2021-06-19 NOTE — LETTER
Letter by Musa Mariano MD at      Author: Musa Mariano MD Service: -- Author Type: --    Filed:  Encounter Date: 8/1/2019 Status: (Other)         Myriam Atkins  [address] [place] [address]             August 1, 2019         Dear MsMellissa Atkins,    Below are the results from your recent visit:    Resulted Orders   CT Chest Without Contrast    Narrative    EXAM: CT CHEST WO CONTRAST  LOCATION: [facility]  DATE/TIME: 7/30/2019 12:10 PM    INDICATION: Chronic cough.  COMPARISON: Abdomen CT 7/20/2015  TECHNIQUE: Helical images were obtained through the chest. Multiplanar reformats were obtained. Dose reduction techniques were used.  CONTRAST: None.    FINDINGS:   LUNGS AND PLEURA: Patient has mild cylindrical bronchiectasis in both lower lobes. No retained secretions or peribronchial thickening. No parenchymal disease or pulmonary nodules.    MEDIASTINUM: No adenopathy. Minimal coronary artery calcifications.    LIMITED UPPER ABDOMEN: Unremarkable.    MUSCULOSKELETAL: Unremarkable.      Impression    CONCLUSION:   1.  Patient has mild, basilar cylindrical bronchiectasis. No signs of acute disease otherwise.  2.  Minimal coronary artery calcifications.           Lung scan shows some chronic bronchitis type changes of the airways.  You also have some evidence of minimal plaque in the arteries around the heart.  We will discuss this further at your follow-up.     Please call with questions or contact us using [email or web address].    Sincerely,        Electronically signed by Musa Mariano MD

## 2021-06-19 NOTE — LETTER
Letter by Musa Mariano MD at      Author: Musa Mariano MD Service: -- Author Type: --    Filed:  Encounter Date: 7/17/2019 Status: (Other)         Myraim Atkins  [address] [place] [address]             July 17, 2019         Dear Ms. Atkins,    Below are the results from your recent visit:    Resulted Orders   Lyme Antibody Cascade   Result Value Ref Range    Lyme Antibody Cascade 0.09 <0.90 Index Value    Narrative    Interpretation of Lyme Disease Total Antibody (IgG/IgM)  <0.90 Test Value=Negative  No detectable antibodies to B. burgdorferi. Patients  in early stages of infection may not produce  detectable levels of antibody. Antibiotic therapy  in early disease may prevent antibody production  from reaching detectable levels. Patients with  clinical history and/or symptoms suggestive of Lyme  disease but with negative test results should be  retested in 2-4 weeks.  0.90-<1.10 Test Value=Borderline  Suggests the presence of antibodies to B.  burgdorferi. Recommend repeat collection in 2-4  weeks.  >=1.10 Test Value=Positive  Indicates the presence of antibodies to B.  burgdorferi. False positive results can occur with  sera from syphilis patients. Cross-reactivity may  occur with relapsing fever, [place] Spotted  fever, other spirochetal diseases, erythematosus,  EBV infection, or CMV infection. Clinical symptoms,  epidemiology of the case and other laboratory tests  should allow for distinction of these conditions from  Lyme disease.       You do not have Lyme disease.     Please call with questions or contact us using Patient Communicator.    Sincerely,        Electronically signed by Musa Mariano MD

## 2021-06-19 NOTE — LETTER
Letter by Musa Mariano MD at      Author: Musa Mariano MD Service: -- Author Type: --    Filed:  Encounter Date: 8/1/2019 Status: (Other)         Myriam Atkins  [address]  [place] [address]             August 1, 2019         Dear Ms. Atkins,    Below are the results from your recent visit:    Resulted Orders   PFT Complete    Narrative    FEV1/FVC is 83 and is normal.  FEV1 is 106% predicted and is normal.  FVC is 101% predicted and is normal.  There was no improvement in spirometry after a single inhaled dose of   bronchodilator.  TLC is 103% predicted and is normal.  RV is 87% predicted and is normal.  DLCO is 108% predicted and is normal when it   is corrected for hemoglobin.  The flow volume loop is normal Yes.    Impression:  Full Pulmonary Function Test is normal.       Good news.  Your lung function tests are normal.  We will discuss further at your follow-up.     Please call with questions or contact us using [employer].    Sincerely,        Electronically signed by Musa Mariano MD

## 2021-06-19 NOTE — LETTER
Letter by Sherley Grier MD at      Author: Sherley Grier MD Service: -- Author Type: --    Filed:  Encounter Date: 9/20/2019 Status: (Other)         Musa Mariano MD  [address]  [address] [place] [address]                                  September 20, 2019    Patient: Myriam Atkins   MR Number: [id number]   YOB: 1960   Date of Visit: 9/20/2019     Dear Dr. Montserrat MD:    Thank you for referring Myriam Atkins to me for evaluation. Below are the relevant portions of my assessment and plan of care.  At this time patient would like to continue following with you for her asthma.    If you have questions, please do not hesitate to call me.     Sincerely,        Sherley Grier MD          CC  No Recipients  Sherley Grier MD  9/20/2019  2:55 PM  Sign at close encounter  Pulmonary Clinic Outpatient Consultation  9/20/2019     Assessment and Plan:   59 year old non-smoker with the most pertinent medical history of obesity, HTN, chronic rhinitis, seasonal and environmental allergies, presenting for evaluation of chronic cough, recurrent bronchitis, and exertional dyspnea.  Patient's clinical presentation is significant with asthma.  Based on my review of her imaging her bronchiectasis is nominal and is unlikely to be the sole cause of her respiratory symptoms (might cause cough, but would not cause progressively worsening exertional dyspnea).    #.  Mild persistent asthma, improved control since starting Flovent once daily.  #.  Very mild bronchiectasis, will suspect this to be purely radiographic rather than clinical finding.  #.  Seasonal and environmental allergies, significant.      Continue Flovent daily; can increase use to twice daily if her respiratory symptoms worsen, during a flare of her allergy symptoms, or during a respiratory illness    Reviewed patient's imaging with her and reassured her of the results    Per her request  I referred patient to Allergy -- I believe she may benefit from starting an over-the-counter medication such as cetirizine or potentially montelukast.    Patient received Pneumovax-23 and influenza vaccines today    Patient should return to clinic on an as-needed basis for a follow-up if her asthma worsens, otherwise she can follow-up once a year for her asthma.  Since she has mild asthma but appears to be well controlled, it would be appropriate for her to be followed up by her PCP, which patient states she would prefer at this time.    I appreciate the opportunity to participate in the care of Myriam Atkins.  Please, feel free to contact me at any time.    Sherley Grier MD  Pulmonary and Critical Care   ______________________________________________________________________________    CC: Bronchiectasis    HPI:   Myriam Atkins is a 59 y.o. never smoker with history of HTN, obesity, chronic rhinitis, presenting today for evaluation of incidentally noted bronchiectasis on chest imaging.    Patient reports that over the last 6 months she has noted increasing exertional dyspnea that is most pronounced on inclines or when she carries laundry.  She has had lifelong respiratory issues, primarily cough that would be triggered by strong smells or scents, as well as environmental allergies.  Sometimes she would get cough with physical activity.  She had frequent episodes of bronchitis as a child as well as a young adult.  She reports being slower than most kids her age due to her breathing.  She started using albuterol over 10 years ago.  She normally uses it when she has significant bronchiectasis episodes.  However over the last 6 months she has noticed increasing her albuterol use.  Albuterol always has been helpful for her.    She reports significant seasonal (usually fall) and environmental allergies (particularly strong sense and aerosolized chemicals).  She used to get allergy shots when her kids were younger, and  would like to reestablish care with an allergist now.    Patient was prescribed Flovent by her PCP couple months ago and has been using it every morning.  She reports that her breathing and her cough have improved since starting Flovent.    She has been diagnosed with LENI about 4 years ago and has been successfully using CPAP.    ROS:  Review of Systems - 10 point ROS reviewed and noted to be negative w/ exceptions as detailed in the HPI.    PMH:  Patient Active Problem List    Diagnosis Date Noted   ? Asymptomatic arteriosclerosis of coronary artery 08/27/2019   ? Bronchiectasis without acute exacerbation (H) 08/27/2019   ? Non-seasonal allergic rhinitis 08/27/2019   ? Obstructive sleep apnea    ? Osteoarthritis Of The Knee    ? Serum Total Cholesterol Was Elevated; CT Calcium Score 3 4/19/17    ? Essential hypertension      PSH:  Past Surgical History:   Procedure Laterality Date   ? JOINT REPLACEMENT       Allergies:  Allergies   Allergen Reactions   ? Hydrochlorothiazide Other (See Comments)     Fatigue and confusion   ? Latex      Hypersentivity    ? Penicillins    ? Sulfamethoxazole-Trimethoprim      Tingling in hands       Family HX:  Family History   Problem Relation Age of Onset   ? Colon cancer Paternal Grandmother    ? Colon cancer Paternal Aunt    ? Lung cancer Mother    ? Thyroid disease Maternal Aunt      Social Hx:  Social History     Socioeconomic History   ? Marital status:      Spouse name: Not on file   ? Number of children: Not on file   ? Years of education: Not on file   ? Highest education level: Not on file   Occupational History   ? Not on file   Social Needs   ? Financial resource strain: Not on file   ? Food insecurity:     Worry: Not on file     Inability: Not on file   ? Transportation needs:     Medical: Not on file     Non-medical: Not on file   Tobacco Use   ? Smoking status: Never Smoker   ? Smokeless tobacco: Never Used   Substance and Sexual Activity   ? Alcohol use: Yes      Alcohol/week: 4.2 oz     Types: 7 Glasses of wine per week   ? Drug use: Not on file   ? Sexual activity: Not on file   Lifestyle   ? Physical activity:     Days per week: Not on file     Minutes per session: Not on file   ? Stress: Not on file   Relationships   ? Social connections:     Talks on phone: Not on file     Gets together: Not on file     Attends [employer] service: Not on file     Active member of club or organization: Not on file     Attends meetings of clubs or organizations: Not on file     Relationship status: Not on file   ? Intimate partner violence:     Fear of current or ex partner: Not on file     Emotionally abused: Not on file     Physically abused: Not on file     Forced sexual activity: Not on file   Other Topics Concern   ? Not on file   Social History Narrative   ? Not on file     Current Meds:  Current Outpatient Medications   Medication Sig Dispense Refill   ? albuterol (PROAIR HFA;PROVENTIL HFA;VENTOLIN HFA) 90 mcg/actuation inhaler Inhale 2 puffs every 6 (six) hours as needed for wheezing. 18 g 3   ? amLODIPine (NORVASC) 5 MG tablet Take 1 tablet (5 mg total) by mouth daily. 90 tablet 1   ? cholecalciferol, vitamin D3, 1,000 unit tablet Take 2,000 Units by mouth daily.     ? clindamycin (CLEOCIN) 300 MG capsule Take 1 capsule (300 mg total) by mouth as needed (TAKE 2 CAPS one hr BEFORE DENTAL APPT). 4 capsule 3   ? DOCOSAHEXANOIC ACID/EPA (FISH OIL ORAL) Take 2 capsules by mouth daily.      ? fluticasone propionate (FLOVENT HFA) 110 mcg/actuation inhaler Inhale 2 puffs Daily at 8:00 am.. 1 Inhaler 2   ? irbesartan (AVAPRO) 300 MG tablet Take 1 tablet (300 mg total) by mouth daily. 90 tablet 1   ? mometasone (NASONEX) 50 mcg/actuation nasal spray USE 2 SPRAYS IN EACH NOSTRIL DAILY. Needs to be unscented or it won't work for patient. 17 g 3   ? rosuvastatin (CRESTOR) 10 MG tablet Take 1 tablet (10 mg total) by mouth at bedtime. 30 tablet 2     No current facility-administered medications  "for this visit.      Physical Exam:  /74   Pulse 68   Resp 20   Ht 5' 4\" (1.626 m)   Wt 195 lb 8 oz (88.7 kg)   SpO2 98% Comment: RA  BMI 33.56 kg/m     Gen: obese  womanalert, oriented, no distress  HEENT: no oropharyngeal lesions, no cervical or supraclavicular lymphadenopathy; no stridor  CV: RRR, no M/G/R  Resp: equal bilateral air entry, breath sounds clear throughout, no focal crackles or wheezes; able to converse in full sentences w/ no respiratory distress  Abd: soft, nontender, no palpable organomegaly  Skin: no apparent rashes  Ext: no cyanosis, clubbing or edema  Neuro: alert, nonfocal    Labs: personally reviewed in the EMR.    Imaging studies: personally reviewed and interpreted. Below are the Radiology interpretations.  #. CT chest, 7/30/19: On personal review there is normal lung parenchyma, no acute lesions/opacities, nominal bronchiectasis.  LUNGS AND PLEURA: Patient has mild cylindrical bronchiectasis in both lower lobes. No retained secretions or peribronchial thickening. No parenchymal disease or pulmonary nodules.  MEDIASTINUM: No adenopathy. Minimal coronary artery calcifications.  LIMITED UPPER ABDOMEN: Unremarkable.  MUSCULOSKELETAL: Unremarkable.    PFT's (7/30/2019): Normal spirometry without clinically significant bronchodilator response (obeyed with significant response and smaller airways), normal lung volumes, normal.  Normal testing does not exclude asthma in an appropriate clinical setting.  FEV1/FVC is 0.83 and is normal. FEV1 is 2.64 L (106 % predicted) and is normal. FVC is 3.18 L (101 % predicted) and normal. There was no improvement in spirometry after a single inhaled dose of bronchodilator. TLC is 5.13 L (103 % predicted) and is normal. DLCO is 108 % predicted and is normal when it is corrected for hemoglobin.             "

## 2021-06-19 NOTE — LETTER
Letter by Angela Connolly MD at      Author: Angela Connolly MD Service: -- Author Type: --    Filed:  Encounter Date: 10/7/2019 Status: Signed         October 7, 2019     Sherley Grier MD  [address] [facility] [address]  [facility] [address]    Patient: Myriam Atkins   MR Number: [id number]   YOB: 1960   Date of Visit: 10/7/2019     Dear Dr. Cherrie MD:    Thank you for referring Myriam Atkins to me for evaluation.  Allergy testing was positive for ragweed and sagebrush.  She does not want to take medication.  She has a nonallergic component to her asthma and allergies, however, we could improve the allergic component with allergy shots.  I have included my note for your review.    If you have questions, please do not hesitate to call me.     Sincerely,        Angela Connolly MD        CC  No Recipients    Progress Notes:Chief complaint: Allergies, possible asthma    History of present illness: This is a pleasant 59-year-old woman I was asked to see for evaluation by Dr Grier in regards to allergies.  She had allergies lifelong and was on allergy shots 25 to 30 years ago.  She reports that over the last year or so she has been having some difficulties with breathing.  She states that she is exposed to strong smells, she will develop coughing fits.  She states that she was diagnosed with possible asthma recently.  She is currently taking Flovent 110 mcg 2 puffs daily.  She reports that with this she feels that she has more energy.  That she had less coughing fits but does still experience these symptoms if exposed to strong smells.  For example, this week and she was in [place] at a play.  She states that she sat down and had a width of a strong smell.  She started coughing and took her albuterol inhaler which resolved the symptoms.  She does not take any allergy medication regularly and occasion.  She states that she was diagnosed with food allergies many years ago.  She states  with citric acid foods she would have itchy throat and itchy mouth.  She states this happens with several fruits and vegetables.  If she eats in the baked or processed form this does not seem to happen.  No hives, swelling or shortness of breath after eating foods, however.    Past medical history: Tonsillectomy, sinus surgery, knee replacement, hypertension, obstructive sleep apnea    Social history: She lives at home with central air and basement, no pets at home, non-smoking environment, no exposure to mold but thinks there might be some mold on the roof of her house    Family history: Mother, brother and children with allergies    Review of Systems performed as above and the remainder is negative    Current Outpatient Medications:   ?  albuterol (PROAIR HFA;PROVENTIL HFA;VENTOLIN HFA) 90 mcg/actuation inhaler, Inhale 2 puffs every 6 (six) hours as needed for wheezing., Disp: 18 g, Rfl: 3  ?  amLODIPine (NORVASC) 5 MG tablet, Take 1 tablet (5 mg total) by mouth daily., Disp: 90 tablet, Rfl: 1  ?  cholecalciferol, vitamin D3, 1,000 unit tablet, Take 2,000 Units by mouth daily., Disp: , Rfl:   ?  clindamycin (CLEOCIN) 300 MG capsule, Take 1 capsule (300 mg total) by mouth as needed (TAKE 2 CAPS one hr BEFORE DENTAL APPT)., Disp: 4 capsule, Rfl: 3  ?  DOCOSAHEXANOIC ACID/EPA (FISH OIL ORAL), Take 2 capsules by mouth daily. , Disp: , Rfl:   ?  fluticasone propionate (FLOVENT HFA) 110 mcg/actuation inhaler, Inhale 2 puffs Daily at 8:00 am.., Disp: 1 Inhaler, Rfl: 12  ?  irbesartan (AVAPRO) 300 MG tablet, Take 1 tablet (300 mg total) by mouth daily., Disp: 90 tablet, Rfl: 1  ?  mometasone (NASONEX) 50 mcg/actuation nasal spray, USE 2 SPRAYS IN EACH NOSTRIL DAILY. Needs to be unscented or it won't work for patient., Disp: 17 g, Rfl: 3  ?  rosuvastatin (CRESTOR) 10 MG tablet, Take 1 tablet (10 mg total) by mouth at bedtime., Disp: 30 tablet, Rfl: 2    Allergies   Allergen Reactions   ? Hydrochlorothiazide Other (See  "Comments)     Fatigue and confusion   ? Latex      Hypersentivity    ? Penicillins    ? Sulfamethoxazole-Trimethoprim      Tingling in hands         Resp 16   Ht 5' 4\" (1.626 m)   Wt 200 lb 1.6 oz (90.8 kg)   BMI 34.35 kg/m     Gen: Pleasant female not in acute distress  HEENT: Eyes no erythema of the bulbar or palpebral conjunctiva, no edema. Ears: TMs well visualized, no effusions. Nose: No congestion, mucosa normal. Mouth: Throat clear, no lip or tongue edema.   Cardiac: Regular rate and rhythm, no murmurs, rubs or gallops  Respiratory: Clear to auscultation bilaterally, no adventitious breath sounds  Lymph: No supraclavicular or cervical lymphadenopathy  Skin: No rashes or lesions  Psych: Alert and oriented times 3    FENO: 21    Last Percutaneous Allergy Test Results  Trees  Ty, White  1:20 H  (W/F in mm): 0*0 (10/07/19 1103)  Birch Mix 1:20 H (W/F in mm): 0*0 (10/07/19 1103)  Snyder, Common 1:20 H (W/F in mm): 0*0 (10/07/19 1103)  Elm, American 1:20 H (W/F in mm): 0*0 (10/07/19 1103)  Sandoval, Shagbark 1:20 H (W/F in mm): 0*0 (10/07/19 1103)  Maple, Hard/Sugar 1:20 H (W/F in mm): 0*0 (10/07/19 1103)  [place] Mix 1:20 H (W/F in mm): 0*0 (10/07/19 1103)  [place], Red 1:20 H (W/F in mm): 0*0 (10/07/19 1103)  [place], American 1:20 H (W/F in mm): 0*0 (10/07/19 1103)  [place] Tree 1:20 H (W/F in mm): 0*0 (10/07/19 1103)  Dust Mites  D. Pteronyssinus Mite 30,000 AU/ML H (W/F in mm): 0*0 (10/07/19 1103)  D. Farinae Mite 30,000 AU/ML H (W/F in mm: 0*0 (10/07/19 1103)  Grasses  Grass Mix #4 10,000 BAU/ML H: 0*0 (10/07/19 1103)  Tanner Grass 1:20 H (W/F in mm): 0*0 (10/07/19 1103)  Cockroach  Cockroach Mix 1:10 H (W/F in mm): 0*0 (10/07/19 1103)  Molds/Fungi  Alternaria Tenuis 1:10 H (W/F in mm): 0*0 (10/07/19 1103)  Aspergillus Fumigatus 1:10 H (W/F in mm): 0*0 (10/07/19 1103)  Homodendrum Cladosporioides 1:10 H (W/F in mm): 0*0 (10/07/19 1103)  Penicillin Notatum 1:10 H (W/F in mm): 0*0 (10/07/19 " 1103)  Epicoccum 1:10 H (W/F in mm): 0*0 (10/07/19 1103)  Weeds  Ragweed, Short 1:20 H (W/F in mm): 11/40 (10/07/19 1103)  Dock, Sorrel 1:20 H (W/F in mm): 0*0 (10/07/19 1103)  Lamb's Quarter 1:20 H (W/F in mm): 0*0 (10/07/19 1103)  Pigweed, Rough Red Root 1:20 H  (W/F in mm): 0*0 (10/07/19 1103)  Plantain, English 1:20 H  (W/F in mm): 0*0 (10/07/19 1103)  Sagebrush, Mugwort 1:20 H  (W/F in mm): 5/20 (10/07/19 1103)  Animal  Cat 10,000 BAU/ML H (W/F in mm): 0*0 (10/07/19 1103)  Dog 1:10 H (W/F in mm): 0*0 (10/07/19 1103)  Controls  Device Type: QUINTIP (10/07/19 1103)  Neg. control: 50% Glycerine/Saline H (W/F in mm): 0*0 (10/07/19 1103)  Pos. control: Histamine 6mg/ML (W/F in mms): 4/10 (10/07/19 1103)    Impression report and plan:  1.  Allergic rhinitis to ragweed  2.  Mild persistent asthma  3.  Oral allergy syndrome    Patient has oral allergy syndrome rather than true IgE mediated food allergy.  Explained the difference.  Food allergy testing for this reason is not necessary.  Recommended antihistamine or Nasonex.  She is currently using Nasonex.  Could add montelukast to this regimen.  Cautioned her to the rare side effect of mood disturbance.  She has a component of nonallergic and allergic rhinitis.  She also has a component of nonallergic asthma.  Stated that allergic symptoms would improve with allergy shots.  I went over the risks and benefits of allergy shots.  I stated risks include hives, swelling, shortness of breath.  I did state that one in 2.5 million shot administrations can result in death.  I stated they must wait in the office for 30 minutes following the shot and carry an epinephrine device on the day of the shot.  I stated that shots are effective in about 90% of patients.  I stated that they should check with the insurance company prior to proceeding.  They understand the risks and benefits and will let me know how she would like to proceed.  She has a consent form with her.

## 2021-06-20 NOTE — LETTER
Letter by Musa Mariano MD at      Author: Musa Mariano MD Service: -- Author Type: --    Filed:  Encounter Date: 12/12/2019 Status: Signed         Myriam Atkins  [address]  [place] [address]             December 12, 2019         Dear Ms. Atkins,    Below are the results from your recent visit:    Resulted Orders   Lipid Cascade   Result Value Ref Range    Cholesterol 148 <=199 mg/dL    Triglycerides 111 <=149 mg/dL    HDL Cholesterol 54 >=50 mg/dL    LDL Calculated 72 <=129 mg/dL    Patient Fasting > 8hrs? Yes    Comprehensive Metabolic Panel   Result Value Ref Range    Sodium 142 136 - 145 mmol/L    Potassium 3.8 3.5 - 5.0 mmol/L    Chloride 106 98 - 107 mmol/L    CO2 25 22 - 31 mmol/L    Anion Gap, Calculation 11 5 - 18 mmol/L    Glucose 87 70 - 125 mg/dL    BUN 9 8 - 22 mg/dL    Creatinine 0.71 0.60 - 1.10 mg/dL    GFR MDRD Af Amer >60 >60 mL/min/1.73m2    GFR MDRD Non Af Amer >60 >60 mL/min/1.73m2    Bilirubin, Total 0.7 0.0 - 1.0 mg/dL    Calcium 9.7 8.5 - 10.5 mg/dL    Protein, Total 8.0 6.0 - 8.0 g/dL    Albumin 4.1 3.5 - 5.0 g/dL    Alkaline Phosphatase 85 45 - 120 U/L    AST 17 0 - 40 U/L    ALT 22 0 - 45 U/L    Narrative    Fasting Glucose reference range is 70-99 mg/dL per  American Diabetes Association (ADA) guidelines.   HM2(CBC w/o Differential)   Result Value Ref Range    WBC 8.5 4.0 - 11.0 thou/uL    RBC 4.65 3.80 - 5.40 mill/uL    Hemoglobin 13.7 12.0 - 16.0 g/dL    Hematocrit 40.5 35.0 - 47.0 %    MCV 87 80 - 100 fL    MCH 29.6 27.0 - 34.0 pg    MCHC 34.0 32.0 - 36.0 g/dL    RDW 12.6 11.0 - 14.5 %    Platelets 286 140 - 440 thou/uL    MPV 7.7 7.0 - 10.0 fL   Urinalysis-UC if Indicated   Result Value Ref Range    Color, UA Yellow Colorless, Yellow, Straw, Light Yellow    Clarity, UA Clear Clear    Glucose, UA Negative Negative    Bilirubin, UA Negative Negative    Ketones, UA Negative Negative    Specific Gravity, UA 1.010 1.005 - 1.030    Blood, UA Trace (!) Negative    pH, UA 6.5 5.0  - 8.0    Protein, UA Negative Negative mg/dL    Urobilinogen, UA 0.2 E.U./dL 0.2 E.U./dL, 1.0 E.U./dL    Nitrite, UA Negative Negative    Leukocytes, UA Negative Negative    Bacteria, UA None Seen None Seen hpf    RBC, UA 0-2 None Seen, 0-2 hpf    WBC, UA None Seen None Seen, 0-5 hpf    Squam Epithel, UA 0-5 None Seen, 0-5 lpf    Narrative    UC not indicated   Thyroid Cascade   Result Value Ref Range    TSH 1.13 0.30 - 5.00 uIU/mL       Your labs look perfect Myriam.  I am very impressed with the cholesterol.     Please call with questions or contact us using [employer].    Sincerely,        Electronically signed by Musa Mariano MD

## 2021-06-20 NOTE — LETTER
Letter by Musa Mariano MD at      Author: Musa Mariano MD Service: -- Author Type: --    Filed:  Encounter Date: 8/18/2020 Status: (Other)         Myriam Atkins  [address]  [place] [address]             August 18, 2020         Dear Ms. Atkins,    Below are the results from your recent visit:    Resulted Orders   Comprehensive Metabolic Panel   Result Value Ref Range    Sodium 141 136 - 145 mmol/L    Potassium 4.3 3.5 - 5.0 mmol/L    Chloride 106 98 - 107 mmol/L    CO2 25 22 - 31 mmol/L    Anion Gap, Calculation 10 5 - 18 mmol/L    Glucose 96 70 - 125 mg/dL    BUN 8 8 - 22 mg/dL    Creatinine 0.72 0.60 - 1.10 mg/dL    GFR MDRD Af Amer >60 >60 mL/min/1.73m2    GFR MDRD Non Af Amer >60 >60 mL/min/1.73m2    Bilirubin, Total 0.8 0.0 - 1.0 mg/dL    Calcium 9.8 8.5 - 10.5 mg/dL    Protein, Total 8.1 (H) 6.0 - 8.0 g/dL    Albumin 4.2 3.5 - 5.0 g/dL    Alkaline Phosphatase 87 45 - 120 U/L    AST 16 0 - 40 U/L    ALT 18 0 - 45 U/L    Narrative    Fasting Glucose reference range is 70-99 mg/dL per  American Diabetes Association (ADA) guidelines.   Lipid Cascade   Result Value Ref Range    Cholesterol 150 <=199 mg/dL    Triglycerides 131 <=149 mg/dL    HDL Cholesterol 52 >=50 mg/dL    LDL Calculated 72 <=129 mg/dL    Patient Fasting > 8hrs? Yes        Cholesterol looks excellent.  Liver and kidney tests look okay.  Blood protein level is a little high.  This is probably from your fast but I would like you to return at your convenience for an additional test to make sure there are no abnormal proteins in the bloodstream.     Please call with questions or contact us using [employer].    Sincerely,        Electronically signed by Musa Mariano MD

## 2021-06-20 NOTE — LETTER
Letter by Musa Mariano MD at      Author: Musa Mariano MD Service: -- Author Type: --    Filed:  Encounter Date: 8/31/2020 Status: (Other)         Myriam Atkins  [address]  [place] [address]         August 31, 2020       Dear Ms. Atkins,    Below are the results from your recent visit:    Resulted Orders   Electrophoresis, Protein, Serum, Cascade   Result Value Ref Range    Albumin % 60.9 51.0 - 67.0 %    Albumin  4.6 3.2 - 4.7 g/dL    Alpha 1 % 2.2 2.0 - 4.0 %    Alpha 1 0.2 0.1 - 0.3 g/dL    Alpha 2 % 9.5 5.0 - 13.0 %    Alpha 2 0.7 0.4 - 0.9 g/dL    % Beta 11.0 10.0 - 17.0 %    Beta 0.8 0.7 - 1.2 g/dL    Gamma Globulin % 16.4 9.0 - 20.0 %    Gamma Globulin 1.2 0.6 - 1.4 g/dL    ELP Comment Normal serum protein electrophoresis.        Protein, Total 7.6 6.0 - 8.0 g/dL    Path ICD: E88.09     Interpreted By: Jim Pritchett MD            Good news.  Repeat protein level normal, and no abnormal proteins in the blood.     Please call with questions or contact us using [employer].    Sincerely,        Electronically signed by Musa Mariano MD

## 2021-06-23 NOTE — TELEPHONE ENCOUNTER
Refill Approved    Rx renewed per Medication Renewal Policy. Medication was last renewed on 5/8/2018.  Last OV 6/13/2018.    Yolanda Alonzo, [facility] Triage/Med Refill 2/4/2019     Requested Prescriptions   Pending Prescriptions Disp Refills     irbesartan (AVAPRO) 300 MG tablet [Pharmacy Med Name: IRBESARTAN 300MG TABLETS] 90 tablet 0     Sig: TAKE 1 TABLET BY MOUTH EVERY DAY    Angiotensin Receptor Blocker Protocol Passed - 2/2/2019  9:45 AM       Passed - PCP or prescribing provider visit in past 12 months      Last office visit with prescriber/PCP: 6/13/2018 Lynn Hart MD OR same dept: 6/13/2018 Lynn Hart MD OR same specialty: 6/13/2018 Lynn Hart MD  Last physical: 2/6/2018 Last MTM visit: Visit date not found   Next visit within 3 mo: Visit date not found  Next physical within 3 mo: Visit date not found  Prescriber OR PCP: Lynn Hart MD  Last diagnosis associated with med order: 1. HTN (hypertension)  - irbesartan (AVAPRO) 300 MG tablet [Pharmacy Med Name: IRBESARTAN 300MG TABLETS]; TAKE 1 TABLET BY MOUTH EVERY DAY  Dispense: 90 tablet; Refill: 0    If protocol passes may refill for 12 months if within 3 months of last provider visit (or a total of 15 months).            Passed - Serum potassium within the past 12 months    Lab Results   Component Value Date    Potassium 4.0 02/06/2018            Passed - Blood pressure filed in past 12 months    BP Readings from Last 1 Encounters:   06/13/18 136/76            Passed - Serum creatinine within the past 12 months    Creatinine   Date Value Ref Range Status   02/06/2018 0.74 0.60 - 1.10 mg/dL Final

## 2021-06-24 NOTE — PATIENT INSTRUCTIONS - HE
No changes in medications for now.    Your diastolic blood pressure is borderline elevated.  Would like this number to consistently be less than 90.    Try and work on nonpharmacological ways of managing her blood pressure.  Minimal salt.  Increase exercise.  15 pound weight loss over the next year would be great.    Recheck labs today.  I will notify you about the results on my chart.    Let us have you back in 6 months for repeat weight check and blood pressure check.

## 2021-06-24 NOTE — PROGRESS NOTES
Clinic Note    Assessment:     Assessment and Plan:  1. Essential hypertension with goal blood pressure less than 140/90  She would like a CBC, BMP, and lipid profile rechecked today.  No changes in her medications for now.  Blood pressure is 125/85.  She is going to continue on nonpharmacological ways of managing her blood pressure and follow-up with us in 6 months for BP check/weight check.         Patient Instructions   No changes in medications for now.    Your diastolic blood pressure is borderline elevated.  Would like this number to consistently be less than 90.    Try and work on nonpharmacological ways of managing her blood pressure.  Minimal salt.  Increase exercise.  15 pound weight loss over the next year would be great.    Recheck labs today.  I will notify you about the results on my chart.    Let us have you back in 6 months for repeat weight check and blood pressure check.    Return in about 6 months (around 8/26/2019).         Subjective:      Patient comes to clinic today for BP check.    Patient was last seen by her PCP on 6/13/2018, approximately 8 months ago.  At that time, her blood pressure was mildly elevated.  She was told to follow-up if her at home measurements were greater than 140/90.    Patient is using amlodipine 5 mg daily, as well as irbesartan 300 mg daily.    She denies any headache or dizziness.  She has a scheduled appointment with an ophthalmologist for some slight vision changes.    No swelling in lower extremities.  No chest pain or shortness of breath.    She is been trying to work on weight loss.  Exercise been difficult given arthritis in bilateral knees.  She has had both knees replaced but has continued having some stiffness in her right knee.    She tries to avoid salt.  He feels that stress/anxiety are well controlled.    She is hesitant about adding additional blood pressure medications to her regimen.    The following portions of the patient's history were reviewed  and updated as appropriate: Allergies, medications, problems, prior note.    Review of Systems:    Review is otherwise negative except for what is mentioned above.     Social Hx:    Social History     Tobacco Use   Smoking Status Never Smoker   Smokeless Tobacco Never Used         Objective:     Vitals:    02/26/19 1127   BP: 125/85   Pulse: 67   Weight: 195 lb (88.5 kg)       Exam:    General: No apparent distress. Calm. Alert and Oriented X3. Pt behavior is appropriate.    Patient Active Problem List   Diagnosis     Osteoarthritis Of The Knee     Serum Total Cholesterol Was Elevated; CT Calcium Score 3 4/19/17     Essential hypertension     Obstructive sleep apnea     Current Outpatient Medications   Medication Sig Dispense Refill     albuterol (PROVENTIL HFA;VENTOLIN HFA) 90 mcg/actuation inhaler Inhale 2 puffs every 6 (six) hours as needed for wheezing. 18 g 1     amLODIPine (NORVASC) 5 MG tablet Take 1 tablet (5 mg total) by mouth daily. 90 tablet 3     cholecalciferol, vitamin D3, 1,000 unit tablet Take 2,000 Units by mouth daily.       clindamycin (CLEOCIN) 300 MG capsule Take 1 capsule (300 mg total) by mouth as needed (TAKE 2 CAPS one hr BEFORE DENTAL APPT). 4 capsule 3     DOCOSAHEXANOIC ACID/EPA (FISH OIL ORAL) Take 2 capsules by mouth daily.        irbesartan (AVAPRO) 300 MG tablet TAKE 1 TABLET BY MOUTH EVERY DAY 90 tablet 1     mometasone (NASONEX) 50 mcg/actuation nasal spray 2 sprays into each nostril daily. Needs to be unscented or it won't work for her 17 g 12     No current facility-administered medications for this visit.        I spent 25 minutes with patient face to face, of which >50% was counseling regarding the above plan       Anson Graves (Rob), CHARLENE    2/26/2019

## 2021-06-25 NOTE — TELEPHONE ENCOUNTER
Reason for Call:  Other NEEDING OV NOTES AND CWO FORM FAXED ASAP     Detailed comments:       [facility]     Requesting OV notes and CWO  Faxed form to [facility] on 6/8/21    Pt has an appt to get a new cpap tomorrow 06/09/21 and needs this completed by then    [phone] ext [phone]    fax: [phone] Attention: Lorena     Phone Number Patient can be reached at: Other phone number:      [phone] ext [phone]    Best Time: ASAP, NEEDED TODAY    Can we leave a detailed message on this number?: Yes    Call taken on 6/8/2021 at 2:51 PM by Pamela Guthrie

## 2021-06-26 NOTE — PROGRESS NOTES
"  Office Visit - Follow Up   Myriam Atkins   60 y.o. female    Date of Visit: 6/7/2021    Chief Complaint   Patient presents with     Orders Request     discuss orders for new cpap machine & supplies - [facility] [employer] [place] [phone]        Assessment and Plan   1. Obstructive sleep apnea  Presumably controlled with current regimen.  She is compliant with her CPAP.  New prescription for new machine given.  If they need more information including orders from a sleep provider we will need to make that referral.  She understands.  - CPAP        Return in about 5 months (around 11/7/2021) for Next scheduled follow up.     History of Present Illness   This 60 y.o. old Myriam comes in I for follow-up of her sleep apnea.  She has had well-controlled sleep apnea with CPAP for the last 7 years.  Her current machine is no longer working and needs a new one.  She was told that she needs to have a physicians visit.  She apparently has never seen a sleep physician before.  She did have a sleep study and Dr. Lynn Hart gave her an order in the past.  She is compliant with it she states.    Review of Systems: A comprehensive review of systems was negative except as noted.     Medications, Allergies and Problem List   Reviewed, reconciled and updated  Post Discharge Medication Reconciliation Status:      Physical Exam   General Appearance:   Pleasant woman who looks well and is in good spirits.    /76 (Patient Site: Left Arm, Patient Position: Sitting, Cuff Size: Adult Regular)   Pulse 64   Temp 98.2  F (36.8  C)   Ht 5' 4\" (1.626 m)   Wt 188 lb (85.3 kg)   SpO2 99%   BMI 32.27 kg/m           Additional Information   Current Outpatient Medications   Medication Sig Dispense Refill     albuterol (PROAIR HFA;PROVENTIL HFA;VENTOLIN HFA) 90 mcg/actuation inhaler Inhale 2 puffs every 6 (six) hours as needed for wheezing. 18 g 3     amLODIPine (NORVASC) 5 MG tablet TAKE 1 TABLET(5 MG) BY MOUTH DAILY 90 tablet 3     " cholecalciferol, vitamin D3, 1,000 unit tablet Take 2,000 Units by mouth daily.       DOCOSAHEXANOIC ACID/EPA (FISH OIL ORAL) Take 2 capsules by mouth daily.        FLOVENT  mcg/actuation inhaler INHALE 2 PUFFS BY MOUTH DAILY AT 8 AM 1 Inhaler 5     irbesartan (AVAPRO) 300 MG tablet TAKE 1 TABLET(300 MG) BY MOUTH DAILY 90 tablet 3     mometasone (NASONEX) 50 mcg/actuation nasal spray USE 2 SPRAYS IN EACH NOSTRIL DAILY 17 g 11     rosuvastatin (CRESTOR) 5 MG tablet Take 5 mg by mouth at bedtime.       sodium chloride (OCEAN) 0.65 % nasal spray Apply 1 spray into each nostril as needed for congestion.       No current facility-administered medications for this visit.      Allergies   Allergen Reactions     Hydrochlorothiazide Other (See Comments)     Fatigue and confusion     Latex      Hypersentivity      Penicillins      Sulfamethoxazole-Trimethoprim      Tingling in hands       Social History     Tobacco Use     Smoking status: Never Smoker     Smokeless tobacco: Never Used   Substance Use Topics     Alcohol use: Yes     Alcohol/week: 7.0 standard drinks     Types: 7 Glasses of wine per week     Drug use: Not on file       Review and/or order of clinical lab tests:  Review and/or order of radiology tests:  Review and/or order of medicine tests:  Discussion of test results with performing physician:  Decision to obtain old records and/or obtain history from someone other than the patient:  Review and summarization of old records and/or obtaining history from someone other than the patient and.or discussion of case with another health care provider:  Independent visualization of image, tracing or specimen itself:    Time: not applicable     Musa Mariano MD

## 2021-07-06 VITALS
HEIGHT: 64 IN | DIASTOLIC BLOOD PRESSURE: 76 MMHG | BODY MASS INDEX: 32.1 KG/M2 | TEMPERATURE: 98.2 F | OXYGEN SATURATION: 99 % | WEIGHT: 188 LBS | HEART RATE: 64 BPM | SYSTOLIC BLOOD PRESSURE: 124 MMHG

## 2021-07-08 ENCOUNTER — TRANSFERRED RECORDS (OUTPATIENT)
Dept: HEALTH INFORMATION MANAGEMENT | Facility: CLINIC | Age: 61
End: 2021-07-08
Payer: COMMERCIAL

## 2021-08-06 ENCOUNTER — MEDICAL CORRESPONDENCE (OUTPATIENT)
Dept: HEALTH INFORMATION MANAGEMENT | Facility: CLINIC | Age: 61
End: 2021-08-06

## 2021-08-09 ENCOUNTER — OFFICE VISIT (OUTPATIENT)
Dept: INTERNAL MEDICINE | Facility: CLINIC | Age: 61
End: 2021-08-09
Payer: COMMERCIAL

## 2021-08-09 VITALS
SYSTOLIC BLOOD PRESSURE: 122 MMHG | OXYGEN SATURATION: 97 % | HEIGHT: 64 IN | DIASTOLIC BLOOD PRESSURE: 78 MMHG | HEART RATE: 70 BPM | BODY MASS INDEX: 32.1 KG/M2 | WEIGHT: 188 LBS

## 2021-08-09 DIAGNOSIS — M25.572 PAIN IN JOINT, ANKLE AND FOOT, LEFT: ICD-10-CM

## 2021-08-09 DIAGNOSIS — I25.10 ASYMPTOMATIC ARTERIOSCLEROSIS OF CORONARY ARTERY: ICD-10-CM

## 2021-08-09 DIAGNOSIS — G47.33 OBSTRUCTIVE SLEEP APNEA: Primary | ICD-10-CM

## 2021-08-09 PROCEDURE — 99213 OFFICE O/P EST LOW 20 MIN: CPT | Performed by: INTERNAL MEDICINE

## 2021-08-09 RX ORDER — METRONIDAZOLE 7.5 MG/G
GEL TOPICAL
COMMUNITY
End: 2021-12-09

## 2021-08-09 ASSESSMENT — MIFFLIN-ST. JEOR: SCORE: 1392.76

## 2021-08-09 NOTE — PROGRESS NOTES
"  Office Visit - Follow Up   Myriam Atkins   61 year old female    Date of Visit: 8/9/2021    Chief Complaint   Patient presents with     Follow Up     needs documentation for compliance use of new CPAP machine ([facility])         Assessment and Plan   1. Obstructive sleep apnea  Continue CPAP as prescribed.    2. Asymptomatic arteriosclerosis of coronary artery  Continue statin for primary prevention.  She does not wish to take a baby aspirin at this time.  Now that she is on the statin I think that is acceptable.    3. Pain in joint, ankle and foot, left  Follow-up with the foot and ankle surgeon as needed.        No follow-ups on file.     History of Present Illness   This 61 year old Myriam comes in today for follow-up of her sleep apnea.  She got a new machine.  She is compliant with it.  She is used it all of the nights except for 1 looks like on her download.  She does not remember why that was.  She thought she used it every night.  She does not like it is much as her old machine but it is working for her.    She saw someone with [facility] for pain in her foot.  A podiatrist.  She got some new shoes and inserts and is icing her foot.    She has very mild coronary disease on a heart scan.  She is taking statin.  She does not wish to take a baby aspirin.    Review of Systems: A comprehensive review of systems was negative except as noted.     Medications, Allergies and Problem List   Reviewed, reconciled and updated  Post Discharge Medication Reconciliation Status:      Physical Exam   General Appearance:   Pleasant woman in no distress.    /78 (BP Location: Left arm, Patient Position: Sitting, Cuff Size: Adult Small)   Pulse 70   Ht 1.626 m (5' 4\")   Wt 85.3 kg (188 lb)   SpO2 97%   BMI 32.27 kg/m           Additional Information   Current Outpatient Medications   Medication Sig Dispense Refill     amLODIPine (NORVASC) 5 MG tablet TAKE 1 TABLET(5 MG) BY MOUTH DAILY       cholecalciferol " (VITAMIN D-1000 MAX ST) 25 MCG (1000 UT) TABS Take 2,000 Units by mouth       clindamycin (CLEOCIN) 300 MG capsule TK 2  CS PO 1 H BEFORE DENTAL APPOINTMENT  1     FLOVENT  MCG/ACT inhaler INHALE 2 PUFFS PO D AT 8 AM       irbesartan (AVAPRO) 300 MG tablet TAKE 1 TABLET(300 MG) BY MOUTH DAILY       metroNIDAZOLE (METROGEL) 0.75 % external gel Use as needed for skin       rosuvastatin (CRESTOR) 10 MG tablet TAKE 1 TABLET(10 MG) BY MOUTH AT BEDTIME       Allergies   Allergen Reactions     Hydrochlorothiazide Other (See Comments)     Fatigue and confusion     Latex      Hypersentivity      Penicillins      Sulfamethoxazole-Trimethoprim      Tingling in hands       Social History     Tobacco Use     Smoking status: Never Smoker     Smokeless tobacco: Never Used   Substance Use Topics     Alcohol use: Yes     Alcohol/week: 7.0 standard drinks     Drug use: None       Review and/or order of clinical lab tests:  Review and/or order of radiology tests:  Review and/or order of medicine tests:  Discussion of test results with performing physician:  Decision to obtain old records and/or obtain history from someone other than the patient:  Review and summarization of old records and/or obtaining history from someone other than the patient and.or discussion of case with another health care provider:  Independent visualization of image, tracing or specimen itself:    Time:      RICARDO KESSLER MD

## 2021-10-06 DIAGNOSIS — E78.2 MIXED HYPERLIPIDEMIA: ICD-10-CM

## 2021-10-06 DIAGNOSIS — J30.89 NON-SEASONAL ALLERGIC RHINITIS: Primary | ICD-10-CM

## 2021-10-06 DIAGNOSIS — J30.89 NON-SEASONAL ALLERGIC RHINITIS DUE TO OTHER ALLERGIC TRIGGER: ICD-10-CM

## 2021-10-06 DIAGNOSIS — I10 ESSENTIAL HYPERTENSION: ICD-10-CM

## 2021-10-06 RX ORDER — IRBESARTAN 300 MG/1
TABLET ORAL
Qty: 90 TABLET | Refills: 3 | Status: SHIPPED | OUTPATIENT
Start: 2021-10-06 | End: 2022-01-04

## 2021-10-06 RX ORDER — AMLODIPINE BESYLATE 5 MG/1
TABLET ORAL
Qty: 90 TABLET | Refills: 3 | Status: SHIPPED | OUTPATIENT
Start: 2021-10-06 | End: 2022-01-04

## 2021-10-06 RX ORDER — ROSUVASTATIN CALCIUM 10 MG/1
TABLET, COATED ORAL
Qty: 90 TABLET | Refills: 3 | Status: SHIPPED | OUTPATIENT
Start: 2021-10-06 | End: 2021-12-09

## 2021-10-06 RX ORDER — MOMETASONE FUROATE MONOHYDRATE 50 UG/1
2 SPRAY, METERED NASAL DAILY
Qty: 17 G | Refills: 3 | Status: SHIPPED | OUTPATIENT
Start: 2021-10-06 | End: 2022-11-22

## 2021-10-06 NOTE — TELEPHONE ENCOUNTER
Reason for Call:  Medication or medication refill:    Do you use a [facility]?  Name of the pharmacy and phone number for the current request:    [facility] - [place] in [place]    Name of the medication requested:   Rovustatin - 5mg 1 tab daily #90 - need ASAP  Mometasone 50 mcg 2 sprays each nostil daily - need ASAP  Amlodipine 5mg 1 tab daily #90  Irbesartan 300mg 1 tab daily #90      Other request: n/a    Can we leave a detailed message on this number? YES    Phone number patient can be reached at: Cell number on file:    Telephone Information:   Mobile [phone]       Best Time: anytime    Call taken on 10/6/2021 at 1:26 PM by Susana Lubin

## 2021-10-07 ENCOUNTER — TELEPHONE (OUTPATIENT)
Dept: INTERNAL MEDICINE | Facility: CLINIC | Age: 61
End: 2021-10-07

## 2021-10-07 DIAGNOSIS — E78.2 MIXED HYPERLIPIDEMIA: Primary | ICD-10-CM

## 2021-10-07 RX ORDER — ROSUVASTATIN CALCIUM 5 MG/1
5 TABLET, COATED ORAL DAILY
Qty: 90 TABLET | Refills: 3 | Status: SHIPPED | OUTPATIENT
Start: 2021-10-07 | End: 2022-06-14

## 2021-10-07 NOTE — TELEPHONE ENCOUNTER
Reason for Call:  Medication or medication refill:    Do you use a [facility]?  Name of the pharmacy and phone number for the current request:  [facility]    Name of the medication requested:     Dr Mariano had changed to 5 mg-please send in new script.    rosuvastatin (CRESTOR) 10 MG tablet 90 tablet 3 10/6/2021  No   Sig: TAKE 1 TABLET(10 MG) BY MOUTH AT BEDTIME         Other request: na    Can we leave a detailed message on this number? YES    Phone number patient can be reached at: Cell number on file:    Telephone Information:   Mobile [phone]       Best Time: any    Call taken on 10/7/2021 at 9:50 AM by Pam J. Behr

## 2021-10-10 ENCOUNTER — HEALTH MAINTENANCE LETTER (OUTPATIENT)
Age: 61
End: 2021-10-10

## 2021-11-03 ENCOUNTER — TRANSFERRED RECORDS (OUTPATIENT)
Dept: HEALTH INFORMATION MANAGEMENT | Facility: CLINIC | Age: 61
End: 2021-11-03
Payer: COMMERCIAL

## 2021-12-09 ENCOUNTER — OFFICE VISIT (OUTPATIENT)
Dept: INTERNAL MEDICINE | Facility: CLINIC | Age: 61
End: 2021-12-09
Payer: COMMERCIAL

## 2021-12-09 VITALS
BODY MASS INDEX: 34.31 KG/M2 | WEIGHT: 201 LBS | HEART RATE: 58 BPM | OXYGEN SATURATION: 96 % | SYSTOLIC BLOOD PRESSURE: 132 MMHG | DIASTOLIC BLOOD PRESSURE: 74 MMHG | HEIGHT: 64 IN

## 2021-12-09 DIAGNOSIS — Z11.4 SCREENING FOR HIV (HUMAN IMMUNODEFICIENCY VIRUS): ICD-10-CM

## 2021-12-09 DIAGNOSIS — I10 ESSENTIAL HYPERTENSION: ICD-10-CM

## 2021-12-09 DIAGNOSIS — E78.2 MIXED HYPERLIPIDEMIA: ICD-10-CM

## 2021-12-09 DIAGNOSIS — I25.10 ASYMPTOMATIC ARTERIOSCLEROSIS OF CORONARY ARTERY: ICD-10-CM

## 2021-12-09 DIAGNOSIS — R63.5 WEIGHT GAIN: Primary | ICD-10-CM

## 2021-12-09 LAB
ALBUMIN SERPL-MCNC: 4.1 G/DL (ref 3.5–5)
ALP SERPL-CCNC: 80 U/L (ref 45–120)
ALT SERPL W P-5'-P-CCNC: 27 U/L (ref 0–45)
ANION GAP SERPL CALCULATED.3IONS-SCNC: 8 MMOL/L (ref 5–18)
AST SERPL W P-5'-P-CCNC: 19 U/L (ref 0–40)
BILIRUB SERPL-MCNC: 0.9 MG/DL (ref 0–1)
BUN SERPL-MCNC: 12 MG/DL (ref 8–22)
CALCIUM SERPL-MCNC: 10.1 MG/DL (ref 8.5–10.5)
CHLORIDE BLD-SCNC: 106 MMOL/L (ref 98–107)
CHOLEST SERPL-MCNC: 167 MG/DL
CO2 SERPL-SCNC: 28 MMOL/L (ref 22–31)
CREAT SERPL-MCNC: 0.74 MG/DL (ref 0.6–1.1)
FASTING STATUS PATIENT QL REPORTED: YES
GFR SERPL CREATININE-BSD FRML MDRD: 86 ML/MIN/1.73M2
GLUCOSE BLD-MCNC: 96 MG/DL (ref 70–125)
HDLC SERPL-MCNC: 53 MG/DL
LDLC SERPL CALC-MCNC: 84 MG/DL
POTASSIUM BLD-SCNC: 5.1 MMOL/L (ref 3.5–5)
PROT SERPL-MCNC: 7.4 G/DL (ref 6–8)
SODIUM SERPL-SCNC: 142 MMOL/L (ref 136–145)
TRIGL SERPL-MCNC: 151 MG/DL
TSH SERPL DL<=0.005 MIU/L-ACNC: 1.83 UIU/ML (ref 0.3–5)

## 2021-12-09 PROCEDURE — 80053 COMPREHEN METABOLIC PANEL: CPT | Performed by: INTERNAL MEDICINE

## 2021-12-09 PROCEDURE — 99213 OFFICE O/P EST LOW 20 MIN: CPT | Performed by: INTERNAL MEDICINE

## 2021-12-09 PROCEDURE — 36415 COLL VENOUS BLD VENIPUNCTURE: CPT | Performed by: INTERNAL MEDICINE

## 2021-12-09 PROCEDURE — 84443 ASSAY THYROID STIM HORMONE: CPT | Performed by: INTERNAL MEDICINE

## 2021-12-09 PROCEDURE — 80061 LIPID PANEL: CPT | Performed by: INTERNAL MEDICINE

## 2021-12-09 ASSESSMENT — ASTHMA QUESTIONNAIRES
QUESTION_2 LAST FOUR WEEKS HOW OFTEN HAVE YOU HAD SHORTNESS OF BREATH: NOT AT ALL
QUESTION_5 LAST FOUR WEEKS HOW WOULD YOU RATE YOUR ASTHMA CONTROL: WELL CONTROLLED
QUESTION_1 LAST FOUR WEEKS HOW MUCH OF THE TIME DID YOUR ASTHMA KEEP YOU FROM GETTING AS MUCH DONE AT WORK, SCHOOL OR AT HOME: A LITTLE OF THE TIME
QUESTION_3 LAST FOUR WEEKS HOW OFTEN DID YOUR ASTHMA SYMPTOMS (WHEEZING, COUGHING, SHORTNESS OF BREATH, CHEST TIGHTNESS OR PAIN) WAKE YOU UP AT NIGHT OR EARLIER THAN USUAL IN THE MORNING: ONCE OR TWICE
ACT_TOTALSCORE: 21
QUESTION_4 LAST FOUR WEEKS HOW OFTEN HAVE YOU USED YOUR RESCUE INHALER OR NEBULIZER MEDICATION (SUCH AS ALBUTEROL): ONCE A WEEK OR LESS

## 2021-12-09 ASSESSMENT — MIFFLIN-ST. JEOR: SCORE: 1451.73

## 2021-12-09 NOTE — LETTER
December 14, 2021      Myriam Atkins  [address]  [place] [address]        Dear ,    We are writing to inform you of your test results.    Myriam, your labs look pretty good except for the cholesterol.  Your LDL really needs to be below 70.  I would prefer you go back up to the 10 mg of rosuvastatin, however if you are not really willing to do that, then I would definitely work harder on your diet and exercise.  Please let me know if we can send in a 10 mg tablet of the Crestor to your pharmacy.       Resulted Orders   Lipid panel reflex to direct LDL Fasting   Result Value Ref Range    Cholesterol 167 <=199 mg/dL    Triglycerides 151 (H) <=149 mg/dL    Direct Measure HDL 53 >=50 mg/dL      Comment:      HDL Cholesterol Reference Range:     0-2 years:   No reference ranges established for patients under 2 years old  at [facility] for lipid analytes.    2-8 years:  Greater than 45 mg/dL     18 years and older:   Female: Greater than or equal to 50 mg/dL   Male:   Greater than or equal to 40 mg/dL    LDL Cholesterol Calculated 84 <=129 mg/dL    Patient Fasting > 8hrs? Yes    Comprehensive metabolic panel (BMP + Alb, Alk Phos, ALT, AST, Total. Bili, TP)   Result Value Ref Range    Sodium 142 136 - 145 mmol/L    Potassium 5.1 (H) 3.5 - 5.0 mmol/L    Chloride 106 98 - 107 mmol/L    Carbon Dioxide (CO2) 28 22 - 31 mmol/L    Anion Gap 8 5 - 18 mmol/L    Urea Nitrogen 12 8 - 22 mg/dL    Creatinine 0.74 0.60 - 1.10 mg/dL    Calcium 10.1 8.5 - 10.5 mg/dL    Glucose 96 70 - 125 mg/dL    Alkaline Phosphatase 80 45 - 120 U/L    AST 19 0 - 40 U/L    ALT 27 0 - 45 U/L    Protein Total 7.4 6.0 - 8.0 g/dL    Albumin 4.1 3.5 - 5.0 g/dL    Bilirubin Total 0.9 0.0 - 1.0 mg/dL    GFR Estimate 86 >60 mL/min/1.73m2      Comment:      As of July 11, 2021, eGFR is calculated by the CKD-EPI creatinine equation, without race adjustment. eGFR can be influenced by muscle mass, exercise, and diet. The reported eGFR is  an estimation only and is only applicable if the renal function is stable.   TSH with free T4 reflex   Result Value Ref Range    TSH 1.83 0.30 - 5.00 uIU/mL       If you have any questions or concerns, please call the clinic at the number listed above.       Sincerely,      Musa Mariano MD

## 2021-12-09 NOTE — PROGRESS NOTES
Office Visit - Follow Up   Myriam Atkins   61 year old female    Date of Visit: 12/9/2021    Chief Complaint   Patient presents with     Hyperlipidemia     6 mo f/u - fasting         Assessment and Plan   1. Weight gain  Likely due to dietary indiscretion and lack of exercise.  Lifestyle modification discussed.  Check TSH however.  - TSH with free T4 reflex; Future    2. Asymptomatic arteriosclerosis of coronary artery  Continue statin.  Only very minimal disease and she wishes to stay off the aspirin.  - ASPIRIN NOT PRESCRIBED (INTENTIONAL); Please choose reason not prescribed from choices below.  - Lipid panel reflex to direct LDL Fasting; Future  - Comprehensive metabolic panel (BMP + Alb, Alk Phos, ALT, AST, Total. Bili, TP); Future    3. Screening for HIV (human immunodeficiency virus)  We will screen for HIV at a later date.  Very low risk.    4. Mixed hyperlipidemia    - Lipid panel reflex to direct LDL Fasting; Future  - Comprehensive metabolic panel (BMP + Alb, Alk Phos, ALT, AST, Total. Bili, TP); Future    5. Essential hypertension  Fair control.  Labs for monitoring.  - Comprehensive metabolic panel (BMP + Alb, Alk Phos, ALT, AST, Total. Bili, TP); Future        Return in about 6 months (around 6/9/2022) for Routine preventive, with me, in person.     History of Present Illness   This 61 year old Myriam comes in today for follow-up she reports she has gained some weight and she is upset about that.  She is been eating too much and not exercising due to pain.  She is now over 200 pounds on our scale.  She is discouraged about that.  She is going to get back into more exercise and try to avoid the sweets.  Otherwise she denies any new somatic concerns.  Daughter got  and is now pregnant and expecting a baby in April.    Review of Systems: A comprehensive review of systems was negative except as noted.     Medications, Allergies and Problem List   Reviewed, reconciled and updated  Post Discharge  "Medication Reconciliation Status:      Physical Exam   General Appearance:   Delightful woman who looks well.  Vitals noted.    /74 (BP Location: Left arm, Patient Position: Sitting, Cuff Size: Adult Small)   Pulse 58   Ht 1.626 m (5' 4\")   Wt 91.2 kg (201 lb)   SpO2 96%   BMI 34.50 kg/m           Additional Information   Current Outpatient Medications   Medication Sig Dispense Refill     amLODIPine (NORVASC) 5 MG tablet TAKE 1 TABLET(5 MG) BY MOUTH DAILY 90 tablet 3     ASPIRIN NOT PRESCRIBED (INTENTIONAL) Please choose reason not prescribed from choices below.       cholecalciferol (VITAMIN D-1000 MAX ST) 25 MCG (1000 UT) TABS Take 2,000 Units by mouth       clindamycin (CLEOCIN) 300 MG capsule TK 2  CS PO 1 H BEFORE DENTAL APPOINTMENT  1     FLOVENT  MCG/ACT inhaler INHALE 2 PUFFS PO D AT 8 AM       irbesartan (AVAPRO) 300 MG tablet TAKE 1 TABLET(300 MG) BY MOUTH DAILY 90 tablet 3     mometasone (NASONEX) 50 MCG/ACT nasal spray Spray 2 sprays into both nostrils daily 17 g 3     rosuvastatin (CRESTOR) 5 MG tablet Take 1 tablet (5 mg) by mouth daily 90 tablet 3     Allergies   Allergen Reactions     Hydrochlorothiazide Other (See Comments)     Fatigue and confusion     Latex      Hypersentivity      Penicillins      Sulfamethoxazole-Trimethoprim      Tingling in hands       Social History     Tobacco Use     Smoking status: Never Smoker     Smokeless tobacco: Never Used   Substance Use Topics     Alcohol use: Yes     Alcohol/week: 7.0 standard drinks     Drug use: Not on file       Review and/or order of clinical lab tests:  Review and/or order of radiology tests:  Review and/or order of medicine tests:  Discussion of test results with performing physician:  Decision to obtain old records and/or obtain history from someone other than the patient:  Review and summarization of old records and/or obtaining history from someone other than the patient and.or discussion of case with another health care " provider:  Independent visualization of image, tracing or specimen itself:    Time:      RICARDO KESSLER MD

## 2021-12-10 ASSESSMENT — ASTHMA QUESTIONNAIRES: ACT_TOTALSCORE: 21

## 2021-12-21 ENCOUNTER — TELEPHONE (OUTPATIENT)
Dept: INTERNAL MEDICINE | Facility: CLINIC | Age: 61
End: 2021-12-21
Payer: COMMERCIAL

## 2021-12-21 DIAGNOSIS — I25.10 ASYMPTOMATIC ARTERIOSCLEROSIS OF CORONARY ARTERY: Primary | ICD-10-CM

## 2021-12-21 RX ORDER — ROSUVASTATIN CALCIUM 10 MG/1
10 TABLET, COATED ORAL DAILY
Qty: 90 TABLET | Refills: 1 | Status: SHIPPED | OUTPATIENT
Start: 2021-12-21 | End: 2022-06-11

## 2021-12-21 NOTE — TELEPHONE ENCOUNTER
Reason for Call:  Medication or medication refill:    Do you use a [facility]?  Name of the pharmacy and phone number for the current request:  [facility]    Name of the medication requested:   Went to wrong pharm   Disp Refills Start End YAHIR   rosuvastatin (CRESTOR) 5 MG tablet 90 tablet 3 10/7/2021  --   Sig - Route: Take 1 tablet (5 mg) by mouth daily - Oral     amLODIPine (NORVASC) 5 MG tablet 90 tablet 3 10/6/2021  No   Sig: TAKE 1 TABLET(5 MG) BY MOUTH DAILY   Sent to pharmacy as: amLODIPine Besylate 5 MG Oral Tablet (NORVASC     Also needing new script due to labs form 12/9-pt will go on meds and needing new script sent in.   I would prefer you go back up to the 10 mg of rosuvastatin      Other request: na    Can we leave a detailed message on this number? YES    Phone number patient can be reached at: Cell number on file:    Telephone Information:   Mobile [phone]       Best Time: any    Call taken on 12/21/2021 at 1:23 PM by Pam J. Behr

## 2022-01-01 DIAGNOSIS — I10 ESSENTIAL HYPERTENSION: ICD-10-CM

## 2022-01-04 RX ORDER — IRBESARTAN 300 MG/1
TABLET ORAL
Qty: 90 TABLET | Refills: 2 | Status: SHIPPED | OUTPATIENT
Start: 2022-01-04 | End: 2023-01-19

## 2022-01-04 RX ORDER — AMLODIPINE BESYLATE 5 MG/1
TABLET ORAL
Qty: 90 TABLET | Refills: 2 | Status: SHIPPED | OUTPATIENT
Start: 2022-01-04 | End: 2023-01-16

## 2022-03-01 DIAGNOSIS — J47.9 BRONCHIECTASIS WITHOUT ACUTE EXACERBATION (H): Primary | ICD-10-CM

## 2022-03-03 RX ORDER — DEXAMETHASONE 4 MG/1
TABLET ORAL
Qty: 12 G | Refills: 4 | Status: SHIPPED | OUTPATIENT
Start: 2022-03-03 | End: 2023-03-02

## 2022-03-03 NOTE — TELEPHONE ENCOUNTER
"Outpatient Medication Detail     Disp Refills Start End YAHIR   FLOVENT  mcg/actuation inhaler 1 Inhaler 5 4/19/2021  No   Sig: INHALE 2 PUFFS BY MOUTH DAILY AT 8 AM   Sent to pharmacy as: Flovent  mcg/actuation aerosol inhaler (fluticasone propionate)   E-Prescribing Status: Receipt confirmed by pharmacy (4/19/2021  9:20 AM CDT)       Last office visit provider:  12/9/21     Requested Prescriptions   Pending Prescriptions Disp Refills     FLOVENT  MCG/ACT inhaler [Pharmacy Med Name: FLOVENT  MCG ORAL INH 120INH] 12 g      Sig: INHALE 2 PUFFS BY MOUTH DAILY AT 8 AM       Inhaled Steroids Protocol Passed - 3/3/2022  7:42 AM        Passed - Patient is age 12 or older        Passed - Recent (12 mo) or future (30 days) visit within the authorizing provider's specialty     Patient has had an office visit with the authorizing provider or a provider within the authorizing providers department within the previous 12 mos or has a future within next 30 days. See \"Patient Info\" tab in inbasket, or \"Choose Columns\" in Meds & Orders section of the refill encounter.              Passed - Medication is active on med list             Mracial Juan RN 03/03/22 7:42 AM  "

## 2022-05-22 ENCOUNTER — HEALTH MAINTENANCE LETTER (OUTPATIENT)
Age: 62
End: 2022-05-22

## 2022-06-11 DIAGNOSIS — I25.10 ASYMPTOMATIC ARTERIOSCLEROSIS OF CORONARY ARTERY: ICD-10-CM

## 2022-06-11 RX ORDER — ROSUVASTATIN CALCIUM 10 MG/1
TABLET, COATED ORAL
Qty: 90 TABLET | Refills: 1 | Status: SHIPPED | OUTPATIENT
Start: 2022-06-11 | End: 2022-06-14

## 2022-06-11 NOTE — TELEPHONE ENCOUNTER
"Last Written Prescription Date:  12/21/21  Last Fill Quantity: 90,  # refills: 1   Last office visit provider:  12/9/21     Requested Prescriptions   Pending Prescriptions Disp Refills     rosuvastatin (CRESTOR) 10 MG tablet [Pharmacy Med Name: ROSUVASTATIN 10MG TABLETS] 90 tablet 1     Sig: TAKE 1 TABLET(10 MG) BY MOUTH DAILY       Statins Protocol Passed - 6/11/2022  3:26 AM        Passed - LDL on file in past 12 months     Recent Labs   Lab Test 12/09/21  0926   LDL 84             Passed - No abnormal creatine kinase in past 12 months     No lab results found.             Passed - Recent (12 mo) or future (30 days) visit within the authorizing provider's specialty     Patient has had an office visit with the authorizing provider or a provider within the authorizing providers department within the previous 12 mos or has a future within next 30 days. See \"Patient Info\" tab in inbasket, or \"Choose Columns\" in Meds & Orders section of the refill encounter.              Passed - Medication is active on med list        Passed - Patient is age 18 or older        Passed - No active pregnancy on record        Passed - No positive pregnancy test in past 12 months             Taylor Davila, RN 06/11/22 6:54 PM  "

## 2022-06-14 ENCOUNTER — OFFICE VISIT (OUTPATIENT)
Dept: INTERNAL MEDICINE | Facility: CLINIC | Age: 62
End: 2022-06-14
Payer: COMMERCIAL

## 2022-06-14 VITALS
TEMPERATURE: 98 F | HEART RATE: 72 BPM | DIASTOLIC BLOOD PRESSURE: 72 MMHG | OXYGEN SATURATION: 97 % | WEIGHT: 199 LBS | BODY MASS INDEX: 33.97 KG/M2 | SYSTOLIC BLOOD PRESSURE: 124 MMHG | HEIGHT: 64 IN

## 2022-06-14 DIAGNOSIS — G47.33 OBSTRUCTIVE SLEEP APNEA: ICD-10-CM

## 2022-06-14 DIAGNOSIS — M25.50 POLYARTHRALGIA: ICD-10-CM

## 2022-06-14 DIAGNOSIS — I25.10 ASYMPTOMATIC ARTERIOSCLEROSIS OF CORONARY ARTERY: ICD-10-CM

## 2022-06-14 DIAGNOSIS — M79.671 PAIN IN BOTH FEET: ICD-10-CM

## 2022-06-14 DIAGNOSIS — M79.672 PAIN IN BOTH FEET: ICD-10-CM

## 2022-06-14 DIAGNOSIS — I10 ESSENTIAL HYPERTENSION: ICD-10-CM

## 2022-06-14 DIAGNOSIS — E78.2 MIXED HYPERLIPIDEMIA: ICD-10-CM

## 2022-06-14 DIAGNOSIS — J47.9 BRONCHIECTASIS WITHOUT ACUTE EXACERBATION (H): ICD-10-CM

## 2022-06-14 DIAGNOSIS — Z00.00 ROUTINE GENERAL MEDICAL EXAMINATION AT A HEALTH CARE FACILITY: Primary | ICD-10-CM

## 2022-06-14 LAB
ALBUMIN SERPL-MCNC: 4 G/DL (ref 3.5–5)
ALBUMIN UR-MCNC: NEGATIVE MG/DL
ALP SERPL-CCNC: 70 U/L (ref 45–120)
ALT SERPL W P-5'-P-CCNC: 20 U/L (ref 0–45)
ANION GAP SERPL CALCULATED.3IONS-SCNC: 10 MMOL/L (ref 5–18)
APPEARANCE UR: CLEAR
AST SERPL W P-5'-P-CCNC: 17 U/L (ref 0–40)
B BURGDOR IGG+IGM SER QL: 0.05
BACTERIA #/AREA URNS HPF: ABNORMAL /HPF
BASOPHILS # BLD AUTO: 0 10E3/UL (ref 0–0.2)
BASOPHILS NFR BLD AUTO: 1 %
BILIRUB SERPL-MCNC: 0.9 MG/DL (ref 0–1)
BILIRUB UR QL STRIP: NEGATIVE
BUN SERPL-MCNC: 9 MG/DL (ref 8–22)
C REACTIVE PROTEIN LHE: <0.1 MG/DL (ref 0–0.8)
CALCIUM SERPL-MCNC: 9.4 MG/DL (ref 8.5–10.5)
CHLORIDE BLD-SCNC: 107 MMOL/L (ref 98–107)
CHOLEST SERPL-MCNC: 143 MG/DL
CO2 SERPL-SCNC: 25 MMOL/L (ref 22–31)
COLOR UR AUTO: YELLOW
CREAT SERPL-MCNC: 0.74 MG/DL (ref 0.6–1.1)
EOSINOPHIL # BLD AUTO: 0.1 10E3/UL (ref 0–0.7)
EOSINOPHIL NFR BLD AUTO: 1 %
ERYTHROCYTE [DISTWIDTH] IN BLOOD BY AUTOMATED COUNT: 13.5 % (ref 10–15)
ERYTHROCYTE [SEDIMENTATION RATE] IN BLOOD BY WESTERGREN METHOD: 11 MM/HR (ref 0–20)
FASTING STATUS PATIENT QL REPORTED: YES
GFR SERPL CREATININE-BSD FRML MDRD: 90 ML/MIN/1.73M2
GLUCOSE BLD-MCNC: 95 MG/DL (ref 70–125)
GLUCOSE UR STRIP-MCNC: NEGATIVE MG/DL
HCT VFR BLD AUTO: 43 % (ref 35–47)
HDLC SERPL-MCNC: 56 MG/DL
HGB BLD-MCNC: 14.2 G/DL (ref 11.7–15.7)
HGB UR QL STRIP: NEGATIVE
HIV 1+2 AB+HIV1 P24 AG SERPL QL IA: NEGATIVE
IMM GRANULOCYTES # BLD: 0 10E3/UL
IMM GRANULOCYTES NFR BLD: 0 %
KETONES UR STRIP-MCNC: NEGATIVE MG/DL
LDLC SERPL CALC-MCNC: 64 MG/DL
LEUKOCYTE ESTERASE UR QL STRIP: ABNORMAL
LYMPHOCYTES # BLD AUTO: 1.9 10E3/UL (ref 0.8–5.3)
LYMPHOCYTES NFR BLD AUTO: 30 %
MCH RBC QN AUTO: 28.6 PG (ref 26.5–33)
MCHC RBC AUTO-ENTMCNC: 33 G/DL (ref 31.5–36.5)
MCV RBC AUTO: 87 FL (ref 78–100)
MONOCYTES # BLD AUTO: 0.5 10E3/UL (ref 0–1.3)
MONOCYTES NFR BLD AUTO: 8 %
MUCOUS THREADS #/AREA URNS LPF: PRESENT /LPF
NEUTROPHILS # BLD AUTO: 3.9 10E3/UL (ref 1.6–8.3)
NEUTROPHILS NFR BLD AUTO: 61 %
NITRATE UR QL: NEGATIVE
PH UR STRIP: 6 [PH] (ref 5–8)
PLATELET # BLD AUTO: 250 10E3/UL (ref 150–450)
POTASSIUM BLD-SCNC: 4 MMOL/L (ref 3.5–5)
PROT SERPL-MCNC: 7.7 G/DL (ref 6–8)
RBC # BLD AUTO: 4.97 10E6/UL (ref 3.8–5.2)
RBC #/AREA URNS AUTO: ABNORMAL /HPF
SODIUM SERPL-SCNC: 142 MMOL/L (ref 136–145)
SP GR UR STRIP: 1.02 (ref 1–1.03)
SQUAMOUS #/AREA URNS AUTO: ABNORMAL /LPF
TRIGL SERPL-MCNC: 117 MG/DL
UROBILINOGEN UR STRIP-ACNC: 0.2 E.U./DL
WBC # BLD AUTO: 6.4 10E3/UL (ref 4–11)
WBC #/AREA URNS AUTO: ABNORMAL /HPF

## 2022-06-14 PROCEDURE — 90677 PCV20 VACCINE IM: CPT | Performed by: INTERNAL MEDICINE

## 2022-06-14 PROCEDURE — 85025 COMPLETE CBC W/AUTO DIFF WBC: CPT | Performed by: INTERNAL MEDICINE

## 2022-06-14 PROCEDURE — 99214 OFFICE O/P EST MOD 30 MIN: CPT | Mod: 25 | Performed by: INTERNAL MEDICINE

## 2022-06-14 PROCEDURE — 85652 RBC SED RATE AUTOMATED: CPT | Performed by: INTERNAL MEDICINE

## 2022-06-14 PROCEDURE — 81001 URINALYSIS AUTO W/SCOPE: CPT | Performed by: INTERNAL MEDICINE

## 2022-06-14 PROCEDURE — 36415 COLL VENOUS BLD VENIPUNCTURE: CPT | Performed by: INTERNAL MEDICINE

## 2022-06-14 PROCEDURE — 86140 C-REACTIVE PROTEIN: CPT | Performed by: INTERNAL MEDICINE

## 2022-06-14 PROCEDURE — 99396 PREV VISIT EST AGE 40-64: CPT | Mod: 25 | Performed by: INTERNAL MEDICINE

## 2022-06-14 PROCEDURE — 80053 COMPREHEN METABOLIC PANEL: CPT | Performed by: INTERNAL MEDICINE

## 2022-06-14 PROCEDURE — 86618 LYME DISEASE ANTIBODY: CPT | Performed by: INTERNAL MEDICINE

## 2022-06-14 PROCEDURE — 87389 HIV-1 AG W/HIV-1&-2 AB AG IA: CPT | Performed by: INTERNAL MEDICINE

## 2022-06-14 PROCEDURE — 90471 IMMUNIZATION ADMIN: CPT | Performed by: INTERNAL MEDICINE

## 2022-06-14 PROCEDURE — 80061 LIPID PANEL: CPT | Performed by: INTERNAL MEDICINE

## 2022-06-14 RX ORDER — ROSUVASTATIN CALCIUM 10 MG/1
10 TABLET, COATED ORAL DAILY
Qty: 90 TABLET | Refills: 3 | Status: SHIPPED | OUTPATIENT
Start: 2022-06-14 | End: 2023-06-15

## 2022-06-14 RX ORDER — ALBUTEROL SULFATE 90 UG/1
2 AEROSOL, METERED RESPIRATORY (INHALATION) EVERY 6 HOURS
COMMUNITY
End: 2022-06-14

## 2022-06-14 RX ORDER — ALBUTEROL SULFATE 90 UG/1
2 AEROSOL, METERED RESPIRATORY (INHALATION) EVERY 6 HOURS
Qty: 18 G | Refills: 3 | Status: SHIPPED | OUTPATIENT
Start: 2022-06-14 | End: 2022-09-25

## 2022-06-14 ASSESSMENT — ENCOUNTER SYMPTOMS
FREQUENCY: 0
DYSURIA: 0
FEVER: 0
HEADACHES: 0
JOINT SWELLING: 1
WEAKNESS: 0
PALPITATIONS: 0
NERVOUS/ANXIOUS: 0
HEARTBURN: 0
BREAST MASS: 0
ABDOMINAL PAIN: 0
SORE THROAT: 0
DIARRHEA: 0
NAUSEA: 0
DIZZINESS: 0
PARESTHESIAS: 1
ARTHRALGIAS: 1
CONSTIPATION: 0
COUGH: 0
HEMATURIA: 0
SHORTNESS OF BREATH: 0
MYALGIAS: 0
HEMATOCHEZIA: 0
CHILLS: 0

## 2022-06-14 ASSESSMENT — ASTHMA QUESTIONNAIRES: ACT_TOTALSCORE: 23

## 2022-06-14 NOTE — LETTER
June 16, 2022      Myriam ARPAN Atkins  [address]  [place] [address]        Dear ,    We are writing to inform you of your test results.    Inflammatory markers are normal.  I do not think you have an inflammatory arthritis.  Lyme test is negative.  All of the remainder of your labs look perfect.  The flags on the urinalysis are due to vaginal contamination and are not concerning       Resulted Orders   HIV Antigen Antibody Combo   Result Value Ref Range    HIV Antigen Antibody Combo Negative Negative   ESR: Erythrocyte sedimentation rate   Result Value Ref Range    Erythrocyte Sedimentation Rate 11 0 - 20 mm/hr   CRP, inflammation   Result Value Ref Range    CRP <0.1 0.0 - 0.8 mg/dL   Lyme Disease Total Abs Bld with Reflex to Confirm CLIA   Result Value Ref Range    Lyme Disease Antibodies Total 0.05 <0.90      Comment:      Non-reactive, Absence of detectable Borrelia burgdorferi antibodies. A non-reactive result does not exclude the possibility of Borrelia burgdorferi infection. If early Lyme disease is suspected, a second sample should be collected and tested 2 to 4 weeks later.   Comprehensive metabolic panel (BMP + Alb, Alk Phos, ALT, AST, Total. Bili, TP)   Result Value Ref Range    Sodium 142 136 - 145 mmol/L    Potassium 4.0 3.5 - 5.0 mmol/L    Chloride 107 98 - 107 mmol/L    Carbon Dioxide (CO2) 25 22 - 31 mmol/L    Anion Gap 10 5 - 18 mmol/L    Urea Nitrogen 9 8 - 22 mg/dL    Creatinine 0.74 0.60 - 1.10 mg/dL    Calcium 9.4 8.5 - 10.5 mg/dL    Glucose 95 70 - 125 mg/dL    Alkaline Phosphatase 70 45 - 120 U/L    AST 17 0 - 40 U/L    ALT 20 0 - 45 U/L    Protein Total 7.7 6.0 - 8.0 g/dL    Albumin 4.0 3.5 - 5.0 g/dL    Bilirubin Total 0.9 0.0 - 1.0 mg/dL    GFR Estimate 90 >60 mL/min/1.73m2      Comment:      Effective December 21, 2021 eGFRcr in adults is calculated using the 2021 CKD-EPI creatinine equation which includes age and gender (Lorenzo crain al., NEJM, DOI: 10.1056/FDVQjh6740609)   Lipid  panel reflex to direct LDL Fasting   Result Value Ref Range    Cholesterol 143 <=199 mg/dL    Triglycerides 117 <=149 mg/dL    Direct Measure HDL 56 >=50 mg/dL      Comment:      HDL Cholesterol Reference Range:     0-2 years:   No reference ranges established for patients under 2 years old  [facility].    2-8 years:  Greater than 45 mg/dL     18 years and older:   Female: Greater than or equal to 50 mg/dL   Male:   Greater than or equal to 40 mg/dL    LDL Cholesterol Calculated 64 <=129 mg/dL    Patient Fasting > 8hrs? Yes    UA Macro with Reflex to Micro and Culture - lab collect   Result Value Ref Range    Color Urine Yellow Colorless, Straw, Light Yellow, Yellow    Appearance Urine Clear Clear    Glucose Urine Negative Negative mg/dL    Bilirubin Urine Negative Negative    Ketones Urine Negative Negative mg/dL    Specific Gravity Urine 1.020 1.005 - 1.030    Blood Urine Negative Negative    pH Urine 6.0 5.0 - 8.0    Protein Albumin Urine Negative Negative mg/dL    Urobilinogen Urine 0.2 0.2, 1.0 E.U./dL    Nitrite Urine Negative Negative    Leukocyte Esterase Urine Trace (A) Negative   CBC with platelets and differential   Result Value Ref Range    WBC Count 6.4 4.0 - 11.0 10e3/uL    RBC Count 4.97 3.80 - 5.20 10e6/uL    Hemoglobin 14.2 11.7 - 15.7 g/dL    Hematocrit 43.0 35.0 - 47.0 %    MCV 87 78 - 100 fL    MCH 28.6 26.5 - 33.0 pg    MCHC 33.0 31.5 - 36.5 g/dL    RDW 13.5 10.0 - 15.0 %    Platelet Count 250 150 - 450 10e3/uL    % Neutrophils 61 %    % Lymphocytes 30 %    % Monocytes 8 %    % Eosinophils 1 %    % Basophils 1 %    % Immature Granulocytes 0 %    Absolute Neutrophils 3.9 1.6 - 8.3 10e3/uL    Absolute Lymphocytes 1.9 0.8 - 5.3 10e3/uL    Absolute Monocytes 0.5 0.0 - 1.3 10e3/uL    Absolute Eosinophils 0.1 0.0 - 0.7 10e3/uL    Absolute Basophils 0.0 0.0 - 0.2 10e3/uL    Absolute Immature Granulocytes 0.0 <=0.4 10e3/uL   Urine Microscopic   Result Value Ref Range     Bacteria Urine None Seen None Seen /HPF    RBC Urine 0-2 0-2 /HPF /HPF    WBC Urine 0-5 0-5 /HPF /HPF    Squamous Epithelials Urine Moderate (A) None Seen /LPF    Mucus Urine Present (A) None Seen /LPF    Narrative    Urine Culture not indicated       If you have any questions or concerns, please call the clinic at the number listed above.       Sincerely,      Musa Mariano MD

## 2022-06-14 NOTE — PROGRESS NOTES
SUBJECTIVE:   CC: Myriam Atkins is an 61 year old woman who presents for preventive health visit.     Myriam comes in today for physical.  She reports she is doing okay.  She is not doing much or getting out of the house much because she has aches and pains of her joints and she is also worried about her breathing and allergies during this time of the year.  [place]  She sees grandkids.  She has not been in good spirits.  She denies chest pains or shortness of breath.  She is tolerating her statin.  She had a rash this last winter that was itchy in her thigh.  She wonders what kind of arthritis she has.  She is worried about that.  No family history of autoimmune arthritides except of vague history of an aunt with rheumatoid arthritis.      Patient has been advised of split billing requirements and indicates understanding: Yes  Healthy Habits:     Getting at least 3 servings of Calcium per day:  Yes    Bi-annual eye exam:  Yes    Dental care twice a year:  Yes    Sleep apnea or symptoms of sleep apnea:  Sleep apnea    Diet:  Regular (no restrictions)    Frequency of exercise:  1 day/week    Duration of exercise:  Greater than 60 minutes    Taking medications regularly:  Yes    PHQ-2 Total Score: 0    Additional concerns today:  Yes              Today's PHQ-2 Score:   PHQ-2 ( 1999 Pf[employer]) 6/14/2022   Q1: Little interest or pleasure in doing things 0   Q2: Feeling down, depressed or hopeless 0   PHQ-2 Score 0   Q1: Little interest or pleasure in doing things Not at all   Q2: Feeling down, depressed or hopeless Not at all   PHQ-2 Score 0       Abuse: Current or Past (Physical, Sexual or Emotional) - No  Do you feel safe in your environment? Yes    Have you ever done Advance Care Planning? (For example, a Health Directive, POLST, or a discussion with a medical provider or your loved ones about your wishes): No, advance care planning information given to patient to review.  Patient plans  to discuss their wishes with loved ones or provider.      Social History     Tobacco Use     Smoking status: Never Smoker     Smokeless tobacco: Never Used   Substance Use Topics     Alcohol use: Yes     Alcohol/week: 7.0 standard drinks     If you drink alcohol do you typically have >3 drinks per day or >7 drinks per week? No    Alcohol Use 6/14/2022   Prescreen: >3 drinks/day or >7 drinks/week? No   No flowsheet data found.    Reviewed orders with patient.  Reviewed health maintenance and updated orders accordingly - Yes      Breast Cancer Screening:    FHS-7:   Breast CA Risk Assessment (FHS-7) 6/14/2022   Did any of your first-degree relatives have breast or ovarian cancer? No   Did any man in your family have breast cancer? No   Did any woman in your family have breast and ovarian cancer? Yes   Did any woman in your family have breast cancer before age 50 y? No   Do you have 2 or more relatives with breast and/or ovarian cancer? Yes   Do you have 2 or more relatives with breast and/or bowel cancer? Yes       Mammogram Screening: Recommended mammography every 1-2 years with patient discussion and risk factor consideration  Pertinent mammograms are reviewed under the imaging tab.    History of abnormal Pap smear: Not that I am aware of.  She follows with GYN for this.  PAP / HPV 4/9/2019   HPV See Scanned Report     Reviewed and updated as needed this visit by clinical staff   Tobacco  Allergies  Meds                Reviewed and updated as needed this visit by Provider                       Review of Systems   Constitutional: Negative for chills and fever.   HENT: Negative for congestion and sore throat.    Eyes: Negative for visual disturbance.   Respiratory: Negative for cough and shortness of breath.    Cardiovascular: Negative for chest pain and palpitations.   Gastrointestinal: Negative for abdominal pain, constipation, diarrhea, heartburn, hematochezia and nausea.   Breasts:  Negative for tenderness,  "breast mass and discharge.   Genitourinary: Negative for dysuria, frequency, genital sores, hematuria, pelvic pain, urgency, vaginal bleeding and vaginal discharge.   Musculoskeletal: Positive for arthralgias and joint swelling. Negative for myalgias.   Skin: Positive for rash.   Neurological: Positive for paresthesias. Negative for dizziness, weakness and headaches.   Psychiatric/Behavioral: Negative for mood changes. The patient is not nervous/anxious.            OBJECTIVE:   /72 (BP Location: Left arm, Patient Position: Sitting, Cuff Size: Adult Regular)   Pulse 72   Ht 1.626 m (5' 4\")   Wt 90.3 kg (199 lb)   SpO2 97%   BMI 34.16 kg/m    Physical Exam  GENERAL APPEARANCE: healthy, alert and no distress  EYES: Eyes grossly normal to inspection, PERRL and conjunctivae and sclerae normal  HENT: ear canals and TM's normal  NECK: no adenopathy, no asymmetry, masses, or scars and thyroid normal to palpation  RESP: lungs clear to auscultation - no rales, rhonchi or wheezes  CV: regular rate and rhythm, normal S1 S2, no S3 or S4, no murmur, click or rub, no peripheral edema and peripheral pulses strong  ABDOMEN: soft, nontender, no hepatosplenomegaly, no masses and bowel sounds normal  MS: no musculoskeletal defects are noted and gait is age appropriate without ataxia  SKIN: no suspicious lesions or rashes  NEURO: Normal strength and tone, sensory exam grossly normal, mentation intact and speech normal  PSYCH: mentation appears normal and affect normal/bright    Diagnostic Test Results:  Labs reviewed in Epic    ASSESSMENT/PLAN:   1. Routine general medical examination at a health care facility  I urged more regular exercise.  She is getting her GYN care through gynecology.  We updated her pneumococcal vaccine today with the Prevnar 20.    2. Asymptomatic arteriosclerosis of coronary artery  Continue statin.  She is intolerant of aspirin.  - rosuvastatin (CRESTOR) 10 MG tablet; Take 1 tablet (10 mg) by mouth " daily  Dispense: 90 tablet; Refill: 3  - Lipid panel reflex to direct LDL Fasting; Future  - Lipid panel reflex to direct LDL Fasting    3. Bronchiectasis without acute exacerbation (H)  Prevnar today.  Continue inhaled medications as is.  - Pneumococcal 20 Valent Conjugate (Prevnar 20)  - albuterol (PROAIR HFA/PROVENTIL HFA/VENTOLIN HFA) 108 (90 Base) MCG/ACT inhaler; Inhale 2 puffs into the lungs every 6 hours  Dispense: 18 g; Refill: 3    4. Essential hypertension  Blood pressure controlled.  Continue regimen.  - Comprehensive metabolic panel (BMP + Alb, Alk Phos, ALT, AST, Total. Bili, TP); Future  - UA Macro with Reflex to Micro and Culture - lab collect; Future  - Comprehensive metabolic panel (BMP + Alb, Alk Phos, ALT, AST, Total. Bili, TP)  - UA Macro with Reflex to Micro and Culture - lab collect    5. Mixed hyperlipidemia  Lipids today for monitoring.  - Comprehensive metabolic panel (BMP + Alb, Alk Phos, ALT, AST, Total. Bili, TP); Future  - Lipid panel reflex to direct LDL Fasting; Future  - Comprehensive metabolic panel (BMP + Alb, Alk Phos, ALT, AST, Total. Bili, TP)  - Lipid panel reflex to direct LDL Fasting    6. Obstructive sleep apnea  Continue CPAP therapy  - CBC with platelets and differential; Future  - CBC with platelets and differential    7. Pain in both feet  Likely DJD.  Would like to track down her previous x-rays.    8. Polyarthralgia  Likely age-related DJD/osteoarthritis.  We discussed Tylenol and regular exercise.  I have been recommended potentially trying an anti-inflammatory diet to see if that helps her discomfort.  No evidence of synovitis or autoimmune disorders at this time.  Given a rash in the past and where we live we will check Lyme titer.  - ESR: Erythrocyte sedimentation rate; Future  - CRP, inflammation; Future  - Lyme Disease Total Abs Bld with Reflex to Confirm CLIA; Future  - ESR: Erythrocyte sedimentation rate  - CRP, inflammation  - Lyme Disease Total Abs Bld with  "Reflex to Confirm CLIA    Patient has been advised of split billing requirements and indicates understanding: Yes    COUNSELING:  Reviewed preventive health counseling, as reflected in patient instructions    Estimated body mass index is 34.16 kg/m  as calculated from the following:    Height as of this encounter: 1.626 m (5' 4\").    Weight as of this encounter: 90.3 kg (199 lb).    Weight management plan: Discussed healthy diet and exercise guidelines    She reports that she has never smoked. She has never used smokeless tobacco.      Counseling Resources:  ATP IV Guidelines  Pooled Cohorts Equation Calculator  Breast Cancer Risk Calculator  BRCA-Related Cancer Risk Assessment: FHS-7 Tool  FRAX Risk Assessment  ICSI Preventive Guidelines  Dietary Guidelines for Americans, 2010  USDA's MyPlate  ASA Prophylaxis  Lung CA Screening    RICARDO KESSLER MD  [facility]  "

## 2022-09-18 ENCOUNTER — HEALTH MAINTENANCE LETTER (OUTPATIENT)
Age: 62
End: 2022-09-18

## 2022-09-25 DIAGNOSIS — J47.9 BRONCHIECTASIS WITHOUT ACUTE EXACERBATION (H): ICD-10-CM

## 2022-09-25 RX ORDER — ALBUTEROL SULFATE 90 UG/1
AEROSOL, METERED RESPIRATORY (INHALATION)
Qty: 18 G | Refills: 3 | Status: SHIPPED | OUTPATIENT
Start: 2022-09-25 | End: 2024-06-25

## 2022-09-25 NOTE — TELEPHONE ENCOUNTER
"Last Written Prescription Date:  6/14/22  Last Fill Quantity: 18,  # refills: 3   Last office visit provider:  6/14/22     Requested Prescriptions   Pending Prescriptions Disp Refills     VENTOLIN  (90 Base) MCG/ACT inhaler [Pharmacy Med Name: VENTOLIN HFA INH W/DOS CTR 200PUFFS] 18 g 3     Sig: INHALE 2 PUFFS INTO THE LUNGS EVERY 6 HOURS       Asthma Maintenance Inhalers - Anticholinergics Passed - 9/25/2022  3:26 AM        Passed - Patient is age 12 years or older        Passed - Recent (12 mo) or future (30 days) visit within the authorizing provider's specialty     Patient has had an office visit with the authorizing provider or a provider within the authorizing providers department within the previous 12 mos or has a future within next 30 days. See \"Patient Info\" tab in inbasket, or \"Choose Columns\" in Meds & Orders section of the refill encounter.              Passed - Medication is active on med list       Short-Acting Beta Agonist Inhalers Protocol  Passed - 9/25/2022  3:26 AM        Passed - Patient is age 12 or older        Passed - Recent (12 mo) or future (30 days) visit within the authorizing provider's specialty     Patient has had an office visit with the authorizing provider or a provider within the authorizing providers department within the previous 12 mos or has a future within next 30 days. See \"Patient Info\" tab in inbasket, or \"Choose Columns\" in Meds & Orders section of the refill encounter.              Passed - Medication is active on med list             Taylor Davila RN 09/25/22 6:29 PM  "

## 2022-11-21 DIAGNOSIS — J30.89 NON-SEASONAL ALLERGIC RHINITIS DUE TO OTHER ALLERGIC TRIGGER: ICD-10-CM

## 2022-11-22 RX ORDER — MOMETASONE FUROATE MONOHYDRATE 50 UG/1
2 SPRAY, METERED NASAL DAILY
Qty: 17 G | Refills: 2 | Status: SHIPPED | OUTPATIENT
Start: 2022-11-22 | End: 2024-09-04

## 2022-11-22 NOTE — TELEPHONE ENCOUNTER
"Last Written Prescription Date:  10/6/21  Last Fill Quantity: 17 g,  # refills: 3   Last office visit provider:  6/14/22     Requested Prescriptions   Pending Prescriptions Disp Refills     mometasone (NASONEX) 50 MCG/ACT nasal spray 17 g 3     Sig: Spray 2 sprays into both nostrils daily       Nasal Allergy Protocol Passed - 11/22/2022  2:01 PM        Passed - Patient is age 12 or older        Passed - Recent (12 mo) or future (30 days) visit within the authorizing provider's specialty     Patient has had an office visit with the authorizing provider or a provider within the authorizing providers department within the previous 12 mos or has a future within next 30 days. See \"Patient Info\" tab in inbasket, or \"Choose Columns\" in Meds & Orders section of the refill encounter.              Passed - Medication is active on med list             Marcial Juan RN 11/22/22 2:01 PM  "

## 2022-12-16 DIAGNOSIS — I25.10 ASYMPTOMATIC ARTERIOSCLEROSIS OF CORONARY ARTERY: ICD-10-CM

## 2022-12-17 RX ORDER — ROSUVASTATIN CALCIUM 10 MG/1
TABLET, COATED ORAL
Qty: 90 TABLET | Refills: 3 | OUTPATIENT
Start: 2022-12-17

## 2023-01-13 DIAGNOSIS — I10 ESSENTIAL HYPERTENSION: ICD-10-CM

## 2023-01-16 RX ORDER — AMLODIPINE BESYLATE 5 MG/1
5 TABLET ORAL DAILY
Qty: 90 TABLET | Refills: 1 | Status: SHIPPED | OUTPATIENT
Start: 2023-01-16 | End: 2023-06-15

## 2023-01-16 NOTE — TELEPHONE ENCOUNTER
"Last Written Prescription Date:  1/4/22  Last Fill Quantity: 90,  # refills: 2   Last office visit provider:  6/14/22    Requested Prescriptions   Pending Prescriptions Disp Refills     amLODIPine (NORVASC) 5 MG tablet 90 tablet 2     Sig: Take 1 tablet (5 mg) by mouth daily       Calcium Channel Blockers Protocol  Passed - 1/13/2023 11:39 AM        Passed - Blood pressure under 140/90 in past 12 months     BP Readings from Last 3 Encounters:   06/14/22 124/72   12/09/21 132/74   08/09/21 122/78                 Passed - Recent (12 mo) or future (30 days) visit within the authorizing provider's specialty     Patient has had an office visit with the authorizing provider or a provider within the authorizing providers department within the previous 12 mos or has a future within next 30 days. See \"Patient Info\" tab in inbasket, or \"Choose Columns\" in Meds & Orders section of the refill encounter.              Passed - Medication is active on med list        Passed - Patient is age 18 or older        Passed - No active pregnancy on record        Passed - Normal serum creatinine on file in past 12 months     Recent Labs   Lab Test 06/14/22  1019   CR 0.74       Ok to refill medication if creatinine is low          Passed - No positive pregnancy test in past 12 months             Ana Zhao RN 01/16/23 11:11 AM  "

## 2023-01-17 DIAGNOSIS — I10 ESSENTIAL HYPERTENSION: ICD-10-CM

## 2023-01-19 RX ORDER — IRBESARTAN 300 MG/1
300 TABLET ORAL DAILY
Qty: 90 TABLET | Refills: 1 | Status: SHIPPED | OUTPATIENT
Start: 2023-01-19 | End: 2023-06-15

## 2023-01-19 NOTE — TELEPHONE ENCOUNTER
"Last Written Prescription Date:  01/04/2022  Last Fill Quantity: 90,  # refills: 2   Last office visit provider:   06/14/2022    Requested Prescriptions   Pending Prescriptions Disp Refills     irbesartan (AVAPRO) 300 MG tablet 90 tablet 2     Sig: Take 1 tablet (300 mg) by mouth daily       Angiotensin-II Receptors Passed - 1/17/2023  5:52 PM        Passed - Last blood pressure under 140/90 in past 12 months     BP Readings from Last 3 Encounters:   06/14/22 124/72   12/09/21 132/74   08/09/21 122/78                 Passed - Recent (12 mo) or future (30 days) visit within the authorizing provider's specialty     Patient has had an office visit with the authorizing provider or a provider within the authorizing providers department within the previous 12 mos or has a future within next 30 days. See \"Patient Info\" tab in inbasket, or \"Choose Columns\" in Meds & Orders section of the refill encounter.              Passed - Medication is active on med list        Passed - Patient is age 18 or older        Passed - No active pregnancy on record        Passed - Normal serum creatinine on file in past 12 months     Recent Labs   Lab Test 06/14/22  1019   CR 0.74       Ok to refill medication if creatinine is low          Passed - Normal serum potassium on file in past 12 months     Recent Labs   Lab Test 06/14/22  1019   POTASSIUM 4.0                    Passed - No positive pregnancy test in past 12 months             Myriam Salgado 01/19/23 9:04 AM  "

## 2023-02-01 ENCOUNTER — LAB REQUISITION (OUTPATIENT)
Dept: LAB | Facility: CLINIC | Age: 63
End: 2023-02-01

## 2023-02-01 DIAGNOSIS — Z12.4 ENCOUNTER FOR SCREENING FOR MALIGNANT NEOPLASM OF CERVIX: ICD-10-CM

## 2023-02-01 PROCEDURE — G0145 SCR C/V CYTO,THINLAYER,RESCR: HCPCS | Performed by: OBSTETRICS & GYNECOLOGY

## 2023-02-03 LAB
BKR LAB AP GYN ADEQUACY: NORMAL
BKR LAB AP GYN INTERPRETATION: NORMAL
BKR LAB AP HPV REFLEX: NORMAL
BKR LAB AP LMP: NORMAL
BKR LAB AP PREVIOUS ABNL DX: NORMAL
BKR LAB AP PREVIOUS ABNORMAL: NORMAL
PATH REPORT.COMMENTS IMP SPEC: NORMAL
PATH REPORT.COMMENTS IMP SPEC: NORMAL
PATH REPORT.RELEVANT HX SPEC: NORMAL

## 2023-02-13 ENCOUNTER — TELEPHONE (OUTPATIENT)
Dept: INTERNAL MEDICINE | Facility: CLINIC | Age: 63
End: 2023-02-13
Payer: COMMERCIAL

## 2023-02-13 NOTE — TELEPHONE ENCOUNTER
General Call    Contacts       Type Contact Phone/Fax    02/13/2023 10:12 AM CST Phone (Incoming) Myriam Atkins (Self) [phone] (M)        Reason for Call:  Pt would like to know is she needs to get another COVID Booster         would you prefer to receive a phone call?:   Patient would prefer a phone call       Okay to leave a detailed message?: Yes at Cell number on file:    Telephone Information:   Mobile [phone]

## 2023-02-15 NOTE — TELEPHONE ENCOUNTER
Spoke iwth her, her last on was 9/15/22 of the bivalent.  She is up to date on all her shots    Shanell Johnson CMA ([facility])

## 2023-03-02 DIAGNOSIS — J47.9 BRONCHIECTASIS WITHOUT ACUTE EXACERBATION (H): ICD-10-CM

## 2023-03-02 RX ORDER — FLUTICASONE PROPIONATE 110 UG/1
AEROSOL, METERED RESPIRATORY (INHALATION)
Qty: 12 G | Refills: 4 | Status: SHIPPED | OUTPATIENT
Start: 2023-03-02 | End: 2023-12-13

## 2023-06-15 ENCOUNTER — OFFICE VISIT (OUTPATIENT)
Dept: INTERNAL MEDICINE | Facility: CLINIC | Age: 63
End: 2023-06-15
Payer: COMMERCIAL

## 2023-06-15 VITALS
WEIGHT: 199.7 LBS | SYSTOLIC BLOOD PRESSURE: 139 MMHG | HEIGHT: 64 IN | RESPIRATION RATE: 18 BRPM | BODY MASS INDEX: 34.09 KG/M2 | TEMPERATURE: 97.7 F | HEART RATE: 71 BPM | DIASTOLIC BLOOD PRESSURE: 78 MMHG | OXYGEN SATURATION: 98 %

## 2023-06-15 DIAGNOSIS — Z00.00 ROUTINE ADULT HEALTH MAINTENANCE: Primary | ICD-10-CM

## 2023-06-15 DIAGNOSIS — I10 ESSENTIAL HYPERTENSION: ICD-10-CM

## 2023-06-15 DIAGNOSIS — I25.10 ASYMPTOMATIC ARTERIOSCLEROSIS OF CORONARY ARTERY: ICD-10-CM

## 2023-06-15 DIAGNOSIS — J47.9 BRONCHIECTASIS WITHOUT ACUTE EXACERBATION (H): ICD-10-CM

## 2023-06-15 DIAGNOSIS — Z91.09 MULTIPLE ENVIRONMENTAL ALLERGIES: ICD-10-CM

## 2023-06-15 DIAGNOSIS — G47.33 OBSTRUCTIVE SLEEP APNEA: ICD-10-CM

## 2023-06-15 DIAGNOSIS — J30.89 NON-SEASONAL ALLERGIC RHINITIS DUE TO OTHER ALLERGIC TRIGGER: ICD-10-CM

## 2023-06-15 LAB
ALBUMIN SERPL BCG-MCNC: 4.5 G/DL (ref 3.5–5.2)
ALBUMIN UR-MCNC: NEGATIVE MG/DL
ALP SERPL-CCNC: 80 U/L (ref 35–104)
ALT SERPL W P-5'-P-CCNC: 32 U/L (ref 0–50)
ANION GAP SERPL CALCULATED.3IONS-SCNC: 11 MMOL/L (ref 7–15)
APPEARANCE UR: CLEAR
AST SERPL W P-5'-P-CCNC: 28 U/L (ref 0–45)
BASOPHILS # BLD AUTO: 0 10E3/UL (ref 0–0.2)
BASOPHILS NFR BLD AUTO: 1 %
BILIRUB SERPL-MCNC: 0.6 MG/DL
BILIRUB UR QL STRIP: NEGATIVE
BUN SERPL-MCNC: 10.4 MG/DL (ref 8–23)
CALCIUM SERPL-MCNC: 9.9 MG/DL (ref 8.8–10.2)
CHLORIDE SERPL-SCNC: 104 MMOL/L (ref 98–107)
CHOLEST SERPL-MCNC: 154 MG/DL
COLOR UR AUTO: YELLOW
CREAT SERPL-MCNC: 0.72 MG/DL (ref 0.51–0.95)
DEPRECATED HCO3 PLAS-SCNC: 25 MMOL/L (ref 22–29)
EOSINOPHIL # BLD AUTO: 0.1 10E3/UL (ref 0–0.7)
EOSINOPHIL NFR BLD AUTO: 1 %
ERYTHROCYTE [DISTWIDTH] IN BLOOD BY AUTOMATED COUNT: 13.6 % (ref 10–15)
GFR SERPL CREATININE-BSD FRML MDRD: >90 ML/MIN/1.73M2
GLUCOSE SERPL-MCNC: 103 MG/DL (ref 70–99)
GLUCOSE UR STRIP-MCNC: NEGATIVE MG/DL
HCT VFR BLD AUTO: 44.4 % (ref 35–47)
HDLC SERPL-MCNC: 50 MG/DL
HGB BLD-MCNC: 14.6 G/DL (ref 11.7–15.7)
HGB UR QL STRIP: NEGATIVE
IMM GRANULOCYTES # BLD: 0 10E3/UL
IMM GRANULOCYTES NFR BLD: 0 %
KETONES UR STRIP-MCNC: NEGATIVE MG/DL
LDLC SERPL CALC-MCNC: 67 MG/DL
LEUKOCYTE ESTERASE UR QL STRIP: NEGATIVE
LYMPHOCYTES # BLD AUTO: 1.8 10E3/UL (ref 0.8–5.3)
LYMPHOCYTES NFR BLD AUTO: 30 %
MCH RBC QN AUTO: 28.9 PG (ref 26.5–33)
MCHC RBC AUTO-ENTMCNC: 32.9 G/DL (ref 31.5–36.5)
MCV RBC AUTO: 88 FL (ref 78–100)
MONOCYTES # BLD AUTO: 0.6 10E3/UL (ref 0–1.3)
MONOCYTES NFR BLD AUTO: 10 %
NEUTROPHILS # BLD AUTO: 3.4 10E3/UL (ref 1.6–8.3)
NEUTROPHILS NFR BLD AUTO: 58 %
NITRATE UR QL: NEGATIVE
NONHDLC SERPL-MCNC: 104 MG/DL
PH UR STRIP: 6.5 [PH] (ref 5–8)
PLATELET # BLD AUTO: 263 10E3/UL (ref 150–450)
POTASSIUM SERPL-SCNC: 5 MMOL/L (ref 3.4–5.3)
PROT SERPL-MCNC: 8.1 G/DL (ref 6.4–8.3)
RBC # BLD AUTO: 5.06 10E6/UL (ref 3.8–5.2)
SODIUM SERPL-SCNC: 140 MMOL/L (ref 136–145)
SP GR UR STRIP: 1.01 (ref 1–1.03)
TRIGL SERPL-MCNC: 183 MG/DL
UROBILINOGEN UR STRIP-ACNC: 0.2 E.U./DL
WBC # BLD AUTO: 5.9 10E3/UL (ref 4–11)

## 2023-06-15 PROCEDURE — 36415 COLL VENOUS BLD VENIPUNCTURE: CPT | Performed by: INTERNAL MEDICINE

## 2023-06-15 PROCEDURE — 81003 URINALYSIS AUTO W/O SCOPE: CPT | Performed by: INTERNAL MEDICINE

## 2023-06-15 PROCEDURE — 99213 OFFICE O/P EST LOW 20 MIN: CPT | Mod: 25 | Performed by: INTERNAL MEDICINE

## 2023-06-15 PROCEDURE — 80053 COMPREHEN METABOLIC PANEL: CPT | Performed by: INTERNAL MEDICINE

## 2023-06-15 PROCEDURE — 85025 COMPLETE CBC W/AUTO DIFF WBC: CPT | Performed by: INTERNAL MEDICINE

## 2023-06-15 PROCEDURE — 80061 LIPID PANEL: CPT | Performed by: INTERNAL MEDICINE

## 2023-06-15 PROCEDURE — 99396 PREV VISIT EST AGE 40-64: CPT | Performed by: INTERNAL MEDICINE

## 2023-06-15 RX ORDER — AMLODIPINE BESYLATE 5 MG/1
5 TABLET ORAL DAILY
Qty: 90 TABLET | Refills: 3 | Status: SHIPPED | OUTPATIENT
Start: 2023-06-15 | End: 2024-06-25

## 2023-06-15 RX ORDER — ROSUVASTATIN CALCIUM 10 MG/1
10 TABLET, COATED ORAL DAILY
Qty: 90 TABLET | Refills: 3 | Status: SHIPPED | OUTPATIENT
Start: 2023-06-15 | End: 2024-09-06

## 2023-06-15 RX ORDER — FLUTICASONE PROPIONATE AND SALMETEROL 100; 50 UG/1; UG/1
1 POWDER RESPIRATORY (INHALATION) EVERY 12 HOURS
Qty: 1 EACH | Refills: 0 | Status: SHIPPED | OUTPATIENT
Start: 2023-06-15 | End: 2023-07-11

## 2023-06-15 RX ORDER — IRBESARTAN 300 MG/1
300 TABLET ORAL DAILY
Qty: 90 TABLET | Refills: 3 | Status: SHIPPED | OUTPATIENT
Start: 2023-06-15 | End: 2024-06-25

## 2023-06-15 ASSESSMENT — ENCOUNTER SYMPTOMS
HEMATURIA: 0
HEMATOCHEZIA: 0
CONSTIPATION: 1
ABDOMINAL PAIN: 0
CHILLS: 0
COUGH: 1

## 2023-06-15 NOTE — PROGRESS NOTES
"SUBJECTIVE:   Myriam is a 62 year old who presents for Preventive Visit.      6/15/2023     9:04 AM   Additional Questions   Roomed by gene   Accompanied by alone         6/15/2023     9:04 AM   Patient Reported Additional Medications   Patient reports taking the following new medications none     Are you in the first 12 months of your Medicare coverage?  { :280665::\"No\"}    HPI      Have you ever done Advance Care Planning? (For example, a Health Directive, POLST, or a discussion with a medical provider or your loved ones about your wishes): No, advance care planning information given to patient to review.  Patient declined advance care planning discussion at this time.    {Hearing Test Done (Optional):233586}   Fall risk  { :240561}  {If any of the above assessments are answered yes, consider ordering appropriate referrals (Optional):899113::\"click delete button to remove this line now\"}  Cognitive Screening { :845484}    Do you have sleep apnea, excessive snoring or daytime drowsiness?: yes    Reviewed and updated as needed this visit by clinical staff   Tobacco  Allergies  Meds              Reviewed and updated as needed this visit by Provider                 Social History     Tobacco Use     Smoking status: Never     Smokeless tobacco: Never   Vaping Use     Vaping status: Never Used     Passive vaping exposure: Yes   Substance Use Topics     Alcohol use: Yes     Alcohol/week: 7.0 standard drinks of alcohol     {Rooming staff  Click this link to complete the Prescreen if response below is not for today's visit  Alcohol Use Prescreen >3 drinks/day or > 7 drinks/week.  If the prescreen question answer is YES, complete the full AUDIT  :363270}        6/15/2023     9:01 AM   Alcohol Use   Prescreen: >3 drinks/day or >7 drinks/week? No   {add AUDIT responses (Optional) (A score of 7 for adult men is an indication of hazardous drinking; a score of 8 or more is an indication of an alcohol use disorder.  A score " "of 7 or more for adult women is an indication of hazardous drinking or an alchohol use disorder):114723}  Do you have a current opioid prescription? { :345458}  Do you use any other controlled substances or medications that are not prescribed by a provider? {Substance Use :648726::\"None\"}  {Provider  If there are gaps in the social history shown above, please follow the link and refresh the note Link to Social and Substance History :953627}    {Outside tests to abstract? :532886}    {additional problems to add (Optional):197649}    Current providers sharing in care for this patient include: {Rooming staff:  Please update Care Team from storyboard, refresh this note and then delete this statement}  Patient Care Team:  Musa Mariano MD as PCP - General (Internal Medicine)  Musa Mariano MD as Assigned PCP    The following health maintenance items are reviewed in Epic and correct as of today:  Health Maintenance   Topic Date Due     ANNUAL REVIEW OF HM ORDERS  12/09/2022     YEARLY PREVENTIVE VISIT  06/14/2023     MAMMO SCREENING  06/08/2024     COLORECTAL CANCER SCREENING  11/03/2024     LIPID  06/14/2027     ADVANCE CARE PLANNING  06/14/2027     HPV TEST  02/01/2028     PAP  02/01/2028     DTAP/TDAP/TD IMMUNIZATION (3 - Td or Tdap) 02/06/2028     HEPATITIS C SCREENING  Completed     HIV SCREENING  Completed     PHQ-2 (once per calendar year)  Completed     INFLUENZA VACCINE  Completed     Pneumococcal Vaccine: Pediatrics (0 to 5 Years) and At-Risk Patients (6 to 64 Years)  Completed     ZOSTER IMMUNIZATION  Completed     COVID-19 Vaccine  Completed     IPV IMMUNIZATION  Aged Out     MENINGITIS IMMUNIZATION  Aged Out     {Chronicprobdata (optional):300960}  {Decision Support (Optional):911313}    FHS-7:       6/14/2022     9:04 AM 6/15/2023     9:03 AM   Breast CA Risk Assessment (FHS-7)   Did any of your first-degree relatives have breast or ovarian cancer? No No   Did any of your relatives have bilateral breast " "cancer?  No   Did any man in your family have breast cancer? No No   Did any woman in your family have breast and ovarian cancer? Yes Yes   Did any woman in your family have breast cancer before age 50 y? No No   Do you have 2 or more relatives with breast and/or ovarian cancer? Yes Yes   Do you have 2 or more relatives with breast and/or bowel cancer? Yes Yes     {If any of the questions to the BCRA (FHS-7) are answered yes, consider ordering referral for genetic counseling (Optional) :049680::\"click delete button to remove this line now\"}  {AMB Mammogram Decision Support (Optional) :417361}  Pertinent mammograms are reviewed under the imaging tab.    Review of Systems  {ROS COMP (Optional):981577}    OBJECTIVE:   LMP  (LMP Unknown)   Breastfeeding No  Estimated body mass index is 34.16 kg/m  as calculated from the following:    Height as of 22: 1.626 m (5' 4\").    Weight as of 22: 90.3 kg (199 lb).  Physical Exam  {Exam (Optional) :017053}    {Diagnostic Test Results (Optional):363505::\"Diagnostic Test Results:\",\"Labs reviewed in Epic\"}    ASSESSMENT / PLAN:   {Diag Picklist:352286}    {Patient advised of split billing (Optional):554304}      COUNSELING:  {Medicare Counselin}        She reports that she has never smoked. She has never used smokeless tobacco.      Appropriate preventive services were discussed with this patient, including applicable screening as appropriate for cardiovascular disease, diabetes, osteopenia/osteoporosis, and glaucoma.  As appropriate for age/gender, discussed screening for colorectal cancer, prostate cancer, breast cancer, and cervical cancer. Checklist reviewing preventive services available has been given to the patient.    Reviewed patients plan of care and provided an AVS. The {CarePlan:286668} for Myriam meets the Care Plan requirement. This Care Plan has been established and reviewed with the {PATIENT, FAMILY MEMBER, CAREGIVER:020937}.    {Counseling " Resources  [place] Preventive Services Task Force  Cholesterol Screening  Health diet/nutrition  Pooled Cohorts Equation Calculator  [employer] My[employer]ate  ASA Prophylaxis  Lung CA Screening  Osteoporosis prevention/bone health :703457}  {Breast Cancer Risk Calculator  BRCA-Related Cancer Risk Assessment FHS-7 Tool :656631}    RICARDO KESSLER MD  [facility]    Identified Health Risks:  {Medicare required documentation of substance and opioid use disorders screening :821172}

## 2023-06-15 NOTE — PROGRESS NOTES
SUBJECTIVE:   Myriam is a 62 year old who presents for Preventive Visit.      6/15/2023     9:04 AM   Additional Questions   Roomed by gene   Accompanied by alone         6/15/2023     9:04 AM   Patient Reported Additional Medications   Patient reports taking the following new medications none     Are you in the first 12 months of your Medicare coverage?  No    Healthy Habits:     Getting at least 3 servings of Calcium per day:  Yes    Bi-annual eye exam:  Yes    Dental care twice a year:  Yes    Sleep apnea or symptoms of sleep apnea:  Sleep apnea    Diet:  Regular (no restrictions)    Duration of exercise:  N/A    Taking medications regularly:  Yes    Medication side effects:  Other    PHQ-2 Total Score: 0    Additional concerns today:  Yes    Myriam comes in for her annual physical.  Delightful woman.  She suffers from 6 pretty severe environmental allergies.  It is hard for her to go out and do much of anything because she has such a difficult time with certain fragrances and colognes etc.  She has seen allergist in the past and they did not have much to add she tells me.  Pulmonologist has told her to take Zyrtec but she has not been doing that.  She is on Flovent and albuterol as needed.  She has a hard time even going outside because of pollens etc.  [place] make her feel poorly.  She does not want to fly on planes because someone might have a sent on.  Otherwise things are going okay.  Her feet bother her so she does not walk a lot.  She is not doing much exercise otherwise.  She is taking care of grandkids.  She had a new 1 recently.  1 coming again.    Have you ever done Advance Care Planning? (For example, a Health Directive, POLST, or a discussion with a medical provider or your loved ones about your wishes): No, advance care planning information given to patient to review.  Patient declined advance care planning discussion at this time.       Do you have sleep apnea, excessive snoring or daytime  drowsiness?: yes    Reviewed and updated as needed this visit by clinical staff   Tobacco  Allergies  Meds              Reviewed and updated as needed this visit by Provider                 Social History     Tobacco Use     Smoking status: Never     Smokeless tobacco: Never   Vaping Use     Vaping status: Never Used     Passive vaping exposure: Yes   Substance Use Topics     Alcohol use: Yes     Alcohol/week: 7.0 standard drinks of alcohol             6/15/2023     9:01 AM   Alcohol Use   Prescreen: >3 drinks/day or >7 drinks/week? No       Current providers sharing in care for this patient include:   Patient Care Team:  Musa Mariano MD as PCP - General (Internal Medicine)  Musa Mariano MD as Assigned PCP    The following health maintenance items are reviewed in Epic and correct as of today:  Health Maintenance   Topic Date Due     ANNUAL REVIEW OF HM ORDERS  12/09/2022     YEARLY PREVENTIVE VISIT  06/14/2023     MAMMO SCREENING  06/08/2024     COLORECTAL CANCER SCREENING  11/03/2024     LIPID  06/14/2027     HPV TEST  02/01/2028     PAP  02/01/2028     DTAP/TDAP/TD IMMUNIZATION (3 - Td or Tdap) 02/06/2028     ADVANCE CARE PLANNING  06/15/2028     HEPATITIS C SCREENING  Completed     HIV SCREENING  Completed     PHQ-2 (once per calendar year)  Completed     INFLUENZA VACCINE  Completed     Pneumococcal Vaccine: Pediatrics (0 to 5 Years) and At-Risk Patients (6 to 64 Years)  Completed     ZOSTER IMMUNIZATION  Completed     COVID-19 Vaccine  Completed     IPV IMMUNIZATION  Aged Out     MENINGITIS IMMUNIZATION  Aged Out           FHS-7:       6/14/2022     9:04 AM 6/15/2023     9:03 AM   Breast CA Risk Assessment (FHS-7)   Did any of your first-degree relatives have breast or ovarian cancer? No No   Did any of your relatives have bilateral breast cancer?  No   Did any man in your family have breast cancer? No No   Did any woman in your family have breast and ovarian cancer? Yes Yes   Did any woman in your  "family have breast cancer before age 50 y? No No   Do you have 2 or more relatives with breast and/or ovarian cancer? Yes Yes   Do you have 2 or more relatives with breast and/or bowel cancer? Yes Yes       Mammogram Screening: Recommended mammography every 1-2 years with patient discussion and risk factor consideration  Pertinent mammograms are reviewed under the imaging tab.    Review of Systems   Constitutional: Negative for chills.   Respiratory: Positive for cough.    Cardiovascular: Negative for chest pain.   Gastrointestinal: Positive for constipation. Negative for abdominal pain and hematochezia.   Genitourinary: Negative for hematuria.         OBJECTIVE:   /78 (BP Location: Left arm, Patient Position: Sitting, Cuff Size: Adult Large)   Pulse 71   Temp 97.7  F (36.5  C) (Tympanic)   Resp 18   Ht 1.626 m (5' 4\")   Wt 90.6 kg (199 lb 11.2 oz)   LMP  (LMP Unknown)   SpO2 98%   Breastfeeding No   BMI 34.28 kg/m   Estimated body mass index is 34.28 kg/m  as calculated from the following:    Height as of this encounter: 1.626 m (5' 4\").    Weight as of this encounter: 90.6 kg (199 lb 11.2 oz).  Physical Exam  GENERAL APPEARANCE: healthy, alert and no distress  EYES: Eyes grossly normal to inspection, PERRL and conjunctivae and sclerae normal  HENT: ear canals and TM's normal, nose and mouth without ulcers or lesions, oropharynx clear and oral mucous membranes moist  NECK: no adenopathy, no asymmetry, masses, or scars and thyroid normal to palpation  RESP: lungs clear to auscultation - no rales, rhonchi or wheezes  CV: regular rate and rhythm, normal S1 S2, no S3 or S4, no murmur, click or rub, no peripheral edema and peripheral pulses strong  ABDOMEN: soft, nontender, no hepatosplenomegaly, no masses and bowel sounds normal  MS: no musculoskeletal defects are noted and gait is age appropriate without ataxia  SKIN: Small firm nodule right nasolabial fold.  NEURO: Normal strength and tone, sensory " "exam grossly normal, mentation intact and speech normal  PSYCH: mentation appears normal and affect normal/bright        ASSESSMENT / PLAN:       1. Routine adult health maintenance  She is up-to-date on her health maintenance.  I have urged more regular exercise focusing on things that she can do that does not cause pain.  - Comprehensive metabolic panel (BMP + Alb, Alk Phos, ALT, AST, Total. Bili, TP); Future  - Lipid panel reflex to direct LDL Fasting; Future  - UA Macroscopic with reflex to Microscopic and Culture; Future  - CBC with platelets and differential; Future    2. Multiple environmental allergies  Trial of Advair to see if that helps her breathing at least.  I have also recommended taking some Zyrtec especially if she is going to be outside.  - fluticasone-salmeterol (ADVAIR DISKUS) 100-50 MCG/ACT inhaler; Inhale 1 puff into the lungs every 12 hours  Dispense: 1 each; Refill: 0    3. Non-seasonal allergic rhinitis due to other allergic trigger  As above.    4. Bronchiectasis without acute exacerbation (H)  As above.  - fluticasone-salmeterol (ADVAIR DISKUS) 100-50 MCG/ACT inhaler; Inhale 1 puff into the lungs every 12 hours  Dispense: 1 each; Refill: 0    5. Asymptomatic arteriosclerosis of coronary artery  Continue statin.  - rosuvastatin (CRESTOR) 10 MG tablet; Take 1 tablet (10 mg) by mouth daily  Dispense: 90 tablet; Refill: 3    6. Essential hypertension  Fair control.  Continue regimen per  - amLODIPine (NORVASC) 5 MG tablet; Take 1 tablet (5 mg) by mouth daily  Dispense: 90 tablet; Refill: 3  - irbesartan (AVAPRO) 300 MG tablet; Take 1 tablet (300 mg) by mouth daily  Dispense: 90 tablet; Refill: 3    7. Obstructive sleep apnea  She is compliant with her CPAP.          BMI:   Estimated body mass index is 34.28 kg/m  as calculated from the following:    Height as of this encounter: 1.626 m (5' 4\").    Weight as of this encounter: 90.6 kg (199 lb 11.2 oz).   Weight management plan: Discussed " healthy diet and exercise guidelines      She reports that she has never smoked. She has never used smokeless tobacco.      Appropriate preventive services were discussed with this patient, including applicable screening as appropriate for cardiovascular disease, diabetes, osteopenia/osteoporosis, and glaucoma.  As appropriate for age/gender, discussed screening for colorectal cancer, prostate cancer, breast cancer, and cervical cancer. Checklist reviewing preventive services available has been given to the patient.    Reviewed patients plan of care and provided an AVS. The Basic Care Plan (routine screening as documented in Health Maintenance) for Myriam meets the Care Plan requirement. This Care Plan has been established and reviewed with the Patient.      RICARDO KESSLER MD  [facility]    Identified Health Risks:

## 2023-06-15 NOTE — LETTER
June 21, 2023      Myriam ARPAN Atkins  [address] [place] [place] [address]        Dear ,    We are writing to inform you of your test results.        Resulted Orders   Comprehensive metabolic panel (BMP + Alb, Alk Phos, ALT, AST, Total. Bili, TP)   Result Value Ref Range    Sodium 140 136 - 145 mmol/L    Potassium 5.0 3.4 - 5.3 mmol/L    Chloride 104 98 - 107 mmol/L    Carbon Dioxide (CO2) 25 22 - 29 mmol/L    Anion Gap 11 7 - 15 mmol/L    Urea Nitrogen 10.4 8.0 - 23.0 mg/dL    Creatinine 0.72 0.51 - 0.95 mg/dL    Calcium 9.9 8.8 - 10.2 mg/dL    Glucose 103 (H) 70 - 99 mg/dL    Alkaline Phosphatase 80 35 - 104 U/L    AST 28 0 - 45 U/L      Comment:      Reference intervals for this test were updated on 6/12/2023 to more accurately reflect our healthy population. There may be differences in the flagging of prior results with similar values performed with this method. Interpretation of those prior results can be made in the context of the updated reference intervals.    ALT 32 0 - 50 U/L      Comment:      Reference intervals for this test were updated on 6/12/2023 to more accurately reflect our healthy population. There may be differences in the flagging of prior results with similar values performed with this method. Interpretation of those prior results can be made in the context of the updated reference intervals.      Protein Total 8.1 6.4 - 8.3 g/dL    Albumin 4.5 3.5 - 5.2 g/dL    Bilirubin Total 0.6 <=1.2 mg/dL    GFR Estimate >90 >60 mL/min/1.73m2      Comment:      eGFR calculated using 2021 CKD-EPI equation.   Lipid panel reflex to direct LDL Fasting   Result Value Ref Range    Cholesterol 154 <200 mg/dL    Triglycerides 183 (H) <150 mg/dL    Direct Measure HDL 50 >=50 mg/dL    LDL Cholesterol Calculated 67 <=100 mg/dL    Non HDL Cholesterol 104 <130 mg/dL    Narrative    Cholesterol  Desirable:  <200 mg/dL    Triglycerides  Normal:  Less than 150 mg/dL  Borderline High:  150-199 mg/dL  High:  200-499  mg/dL  Very High:  Greater than or equal to 500 mg/dL    Direct Measure HDL  Female:  Greater than or equal to 50 mg/dL   Male:  Greater than or equal to 40 mg/dL    LDL Cholesterol  Desirable:  <100mg/dL  Above Desirable:  100-129 mg/dL   Borderline High:  130-159 mg/dL   High:  160-189 mg/dL   Very High:  >= 190 mg/dL    Non HDL Cholesterol  Desirable:  130 mg/dL  Above Desirable:  130-159 mg/dL  Borderline High:  160-189 mg/dL  High:  190-219 mg/dL  Very High:  Greater than or equal to 220 mg/dL   UA Macroscopic with reflex to Microscopic and Culture   Result Value Ref Range    Color Urine Yellow Colorless, Straw, Light Yellow, Yellow    Appearance Urine Clear Clear    Glucose Urine Negative Negative mg/dL    Bilirubin Urine Negative Negative    Ketones Urine Negative Negative mg/dL    Specific Gravity Urine 1.010 1.005 - 1.030    Blood Urine Negative Negative    pH Urine 6.5 5.0 - 8.0    Protein Albumin Urine Negative Negative mg/dL    Urobilinogen Urine 0.2 0.2, 1.0 E.U./dL    Nitrite Urine Negative Negative    Leukocyte Esterase Urine Negative Negative    Narrative    Microscopic not indicated   CBC with platelets and differential   Result Value Ref Range    WBC Count 5.9 4.0 - 11.0 10e3/uL    RBC Count 5.06 3.80 - 5.20 10e6/uL    Hemoglobin 14.6 11.7 - 15.7 g/dL    Hematocrit 44.4 35.0 - 47.0 %    MCV 88 78 - 100 fL    MCH 28.9 26.5 - 33.0 pg    MCHC 32.9 31.5 - 36.5 g/dL    RDW 13.6 10.0 - 15.0 %    Platelet Count 263 150 - 450 10e3/uL    % Neutrophils 58 %    % Lymphocytes 30 %    % Monocytes 10 %    % Eosinophils 1 %    % Basophils 1 %    % Immature Granulocytes 0 %    Absolute Neutrophils 3.4 1.6 - 8.3 10e3/uL    Absolute Lymphocytes 1.8 0.8 - 5.3 10e3/uL    Absolute Monocytes 0.6 0.0 - 1.3 10e3/uL    Absolute Eosinophils 0.1 0.0 - 0.7 10e3/uL    Absolute Basophils 0.0 0.0 - 0.2 10e3/uL    Absolute Immature Granulocytes 0.0 <=0.4 10e3/uL       If you have any questions or concerns, please call the clinic  at the number listed above.       Sincerely,      Musa Mariano MD

## 2023-06-15 NOTE — PATIENT INSTRUCTIONS
Please try a zyrtec (cetirizine) 10 mg daily.    I am going to send a prescription for a different inhaler to try.  Let me know in a couple weeks if you feel it is better.

## 2023-07-11 ENCOUNTER — MYC MEDICAL ADVICE (OUTPATIENT)
Dept: INTERNAL MEDICINE | Facility: CLINIC | Age: 63
End: 2023-07-11
Payer: COMMERCIAL

## 2023-07-11 DIAGNOSIS — Z91.09 MULTIPLE ENVIRONMENTAL ALLERGIES: ICD-10-CM

## 2023-07-11 DIAGNOSIS — J47.9 BRONCHIECTASIS WITHOUT ACUTE EXACERBATION (H): ICD-10-CM

## 2023-07-11 RX ORDER — FLUTICASONE PROPIONATE AND SALMETEROL 100; 50 UG/1; UG/1
1 POWDER RESPIRATORY (INHALATION) EVERY 12 HOURS
Qty: 1 EACH | Refills: 0 | Status: SHIPPED | OUTPATIENT
Start: 2023-07-11 | End: 2023-08-17

## 2023-07-11 NOTE — TELEPHONE ENCOUNTER
Please review [employer] message on medication update. I have pended a refill for the inhaler if agreeable with request, thank you.

## 2023-07-26 ENCOUNTER — TELEPHONE (OUTPATIENT)
Dept: INTERNAL MEDICINE | Facility: CLINIC | Age: 63
End: 2023-07-26
Payer: COMMERCIAL

## 2023-07-26 NOTE — TELEPHONE ENCOUNTER
July 26, 2023    Outside records received from [facility].  Records were placed in the inbox for Dr. Mariano to review.  A copy was sent to HIM to be scanned into the patient's chart.    Susana Lubin

## 2023-08-17 DIAGNOSIS — J47.9 BRONCHIECTASIS WITHOUT ACUTE EXACERBATION (H): ICD-10-CM

## 2023-08-17 DIAGNOSIS — Z91.09 MULTIPLE ENVIRONMENTAL ALLERGIES: ICD-10-CM

## 2023-08-17 RX ORDER — CEPHALEXIN 250 MG/1
1 CAPSULE ORAL EVERY 12 HOURS
Qty: 1 EACH | Refills: 1 | Status: SHIPPED | OUTPATIENT
Start: 2023-08-17 | End: 2023-10-06

## 2023-08-17 NOTE — TELEPHONE ENCOUNTER
"Last Written Prescription Date:  7/11/2023  Last Fill Quantity: 1,  # refills: 0   Last office visit provider:  6/15/2023     Requested Prescriptions   Pending Prescriptions Disp Refills    ADVAIR DISKUS 100-50 MCG/ACT inhaler [Pharmacy Med Name: ADVAIR DISKUS 100/50MCG (GREEN)60S]       Sig: INHALE 1 PUFF INTO THE LUNGS EVERY 12 HOURS       Inhaled Steroids Protocol Passed - 8/17/2023  5:09 PM        Passed - Patient is age 12 or older        Passed - Recent (12 mo) or future (30 days) visit within the authorizing provider's specialty     Patient has had an office visit with the authorizing provider or a provider within the authorizing providers department within the previous 12 mos or has a future within next 30 days. See \"Patient Info\" tab in inbasket, or \"Choose Columns\" in Meds & Orders section of the refill encounter.              Passed - Medication is active on med list       Long-Acting Beta Agonist Inhalers Protocol  Passed - 8/17/2023  5:09 PM        Passed - Patient is age 12 or older        Passed - Recent (12 mo) or future (30 days) visit within the authorizing provider's specialty     Patient has had an office visit with the authorizing provider or a provider within the authorizing providers department within the previous 12 mos or has a future within next 30 days. See \"Patient Info\" tab in inbasket, or \"Choose Columns\" in Meds & Orders section of the refill encounter.              Passed - Medication is active on med list             Monet Gonzalez RN 08/17/23 5:14 PM  "

## 2023-08-21 DIAGNOSIS — J47.9 BRONCHIECTASIS WITHOUT ACUTE EXACERBATION (H): ICD-10-CM

## 2023-08-21 DIAGNOSIS — Z91.09 MULTIPLE ENVIRONMENTAL ALLERGIES: ICD-10-CM

## 2023-08-21 RX ORDER — CEPHALEXIN 250 MG/1
1 CAPSULE ORAL EVERY 12 HOURS
OUTPATIENT
Start: 2023-08-21

## 2023-10-06 ENCOUNTER — MYC MEDICAL ADVICE (OUTPATIENT)
Dept: INTERNAL MEDICINE | Facility: CLINIC | Age: 63
End: 2023-10-06
Payer: COMMERCIAL

## 2023-10-06 DIAGNOSIS — J47.9 BRONCHIECTASIS WITHOUT ACUTE EXACERBATION (H): ICD-10-CM

## 2023-10-06 DIAGNOSIS — Z91.09 MULTIPLE ENVIRONMENTAL ALLERGIES: ICD-10-CM

## 2023-10-06 NOTE — TELEPHONE ENCOUNTER
"Routing refill request to provider for review/approval because:  Early refill request    Last Written Prescription Date:  8/17/2023  Last Fill Quantity: 1 each,  # refills: 1   Last office visit provider:  6/15/2023     Requested Prescriptions   Pending Prescriptions Disp Refills    fluticasone-salmeterol (ADVAIR DISKUS) 100-50 MCG/ACT inhaler 1 each 1     Sig: Inhale 1 puff into the lungs every 12 hours       Inhaled Steroids Protocol Passed - 10/6/2023  2:26 PM        Passed - Patient is age 12 or older        Passed - Recent (12 mo) or future (30 days) visit within the authorizing provider's specialty     Patient has had an office visit with the authorizing provider or a provider within the authorizing providers department within the previous 12 mos or has a future within next 30 days. See \"Patient Info\" tab in inbasket, or \"Choose Columns\" in Meds & Orders section of the refill encounter.              Passed - Medication is active on med list       Long-Acting Beta Agonist Inhalers Protocol  Passed - 10/6/2023  2:26 PM        Passed - Patient is age 12 or older        Passed - Recent (12 mo) or future (30 days) visit within the authorizing provider's specialty     Patient has had an office visit with the authorizing provider or a provider within the authorizing providers department within the previous 12 mos or has a future within next 30 days. See \"Patient Info\" tab in inbasket, or \"Choose Columns\" in Meds & Orders section of the refill encounter.              Passed - Medication is active on med list             Mahin Hamm RN 10/06/23 2:47 PM  "

## 2023-10-09 RX ORDER — FLUTICASONE PROPIONATE AND SALMETEROL 100; 50 UG/1; UG/1
1 POWDER RESPIRATORY (INHALATION) EVERY 12 HOURS
Qty: 1 EACH | Refills: 1 | Status: SHIPPED | OUTPATIENT
Start: 2023-10-09 | End: 2023-12-12

## 2023-11-15 ENCOUNTER — TELEPHONE (OUTPATIENT)
Dept: INTERNAL MEDICINE | Facility: CLINIC | Age: 63
End: 2023-11-15
Payer: COMMERCIAL

## 2023-11-18 NOTE — TELEPHONE ENCOUNTER
Prior Authorization Not Needed per Insurance    Medication: FLUTICASONE-SALMETEROL 100-50 MCG/ACT IN AEPB  Insurance Company: [employer] - Phone [phone] Fax [phone]  Expected CoPay: $    Pharmacy Filling the Rx: [id number] - [place], [place] - [address] [facility]  Pharmacy Notified: y  Patient Notified: y - pharmacy to notify    Plan prefers BRAND name, PA not needed at this time.

## 2023-12-12 ENCOUNTER — MYC MEDICAL ADVICE (OUTPATIENT)
Dept: INTERNAL MEDICINE | Facility: CLINIC | Age: 63
End: 2023-12-12
Payer: COMMERCIAL

## 2023-12-12 ENCOUNTER — NURSE TRIAGE (OUTPATIENT)
Dept: INTERNAL MEDICINE | Facility: CLINIC | Age: 63
End: 2023-12-12
Payer: COMMERCIAL

## 2023-12-12 DIAGNOSIS — J47.9 BRONCHIECTASIS WITHOUT ACUTE EXACERBATION (H): ICD-10-CM

## 2023-12-12 DIAGNOSIS — Z91.09 MULTIPLE ENVIRONMENTAL ALLERGIES: ICD-10-CM

## 2023-12-12 RX ORDER — FLUTICASONE PROPIONATE AND SALMETEROL 100; 50 UG/1; UG/1
1 POWDER RESPIRATORY (INHALATION) EVERY 12 HOURS
Qty: 1 EACH | Refills: 1 | Status: SHIPPED | OUTPATIENT
Start: 2023-12-12 | End: 2024-02-07

## 2023-12-12 NOTE — TELEPHONE ENCOUNTER
Prefers callback on mobile phone.    Pt denies unsual SOB but experiencing mild SOB.     RN COVID TREATMENT VISIT  12/12/23      The patient has been triaged and does not require a higher level of care.    Myriam Atkins  63 year old  Current weight? 199 lbs    Has the patient been seen by a primary care provider at an [facility] or [facility] within the past two years? Yes.   Have you been in close proximity to/do you have a known exposure to a person with a confirmed case of influenza? No.     General treatment eligibility:  Date of positive COVID test (PCR or at home)?  12-12-23    Are you or have you been hospitalized for this COVID-19 infection? No.   Have you received monoclonal antibodies or antiviral treatment for COVID-19 since this positive test? No.   Do you have any of the following conditions that place you at risk of being very sick from COVID-19?   - Age 50 years or older  - Heart conditions such as cardiomyopathies, congenital heart defects, coronary artery disease, heart arrhythmias, heart failure, hypertension, valve disorders   - Overweight or Obesity (BMI >85th percentile or BMI 25 or higher)  Yes, patient has at least one high risk condition as noted above.     Current COVID symptoms:   - fever or chills  - cough  - headache  - congestion or runny nose  Yes. Patient has at least one symptom as selected.     How many days since symptoms started? 5 days or less. Established patient, 12 years or older weighing at least 88.2 lbs, who has symptoms that started in the past 5 days, has not been hospitalized nor received treatment already, and is at risk for being very sick from COVID-19.     Treatment eligibility by RN:  Are you currently pregnant or nursing? No  Do you have a clinically significant hypersensitivity to nirmatrelvir or ritonavir, or toxic epidermal necrolysis (TEN) or Boyer-Tanner Syndrome? No  Do you have a history of hepatitis, any hepatic impairment  on the Problem List (such as Child-Gonzalez Class C, cirrhosis, fatty liver disease, alcoholic liver disease), or was the last liver lab (hepatic panel, ALT, AST, ALK Phos, bilirubin) elevated in the past 6 months? No  Do you have any history of severe renal impairment (eGFR < 30mL/min)? No    Is patient eligible to continue? Yes, patient meets all eligibility requirements for the RN COVID treatment (as denoted by all no responses above).     Current Outpatient Medications   Medication Sig Dispense Refill    amLODIPine (NORVASC) 5 MG tablet Take 1 tablet (5 mg) by mouth daily 90 tablet 3    ASPIRIN NOT PRESCRIBED (INTENTIONAL) Please choose reason not prescribed from choices below. (Patient not taking: Reported on 6/14/2022)      cholecalciferol 25 MCG (1000 UT) TABS Take 2,000 Units by mouth      clindamycin (CLEOCIN) 300 MG capsule TK 2  CS PO 1 H BEFORE DENTAL APPOINTMENT  1    fluticasone (FLOVENT HFA) 110 MCG/ACT inhaler INHALE 2 PUFFS BY MOUTH DAILY AT 8 AM 12 g 4    fluticasone-salmeterol (ADVAIR DISKUS) 100-50 MCG/ACT inhaler Inhale 1 puff into the lungs every 12 hours 1 each 1    irbesartan (AVAPRO) 300 MG tablet Take 1 tablet (300 mg) by mouth daily 90 tablet 3    mometasone (NASONEX) 50 MCG/ACT nasal spray Spray 2 sprays into both nostrils daily 17 g 2    rosuvastatin (CRESTOR) 10 MG tablet Take 1 tablet (10 mg) by mouth daily 90 tablet 3    VENTOLIN  (90 Base) MCG/ACT inhaler INHALE 2 PUFFS INTO THE LUNGS EVERY 6 HOURS 18 g 3       Medications from List 1 of the standing order (on medications that exclude the use of Paxlovid) that patient is taking: NONE. Is patient taking Amy's Wort? No  Is patient taking McCune's Wort or any meds from List 1? No.   Medications from List 2 of the standing order (on meds that provider needs to adjust) that patient is taking: amlodipine (Norvasc), explained a provider visit is necessary to discuss medication adjustments while taking Paxlovid.  rivaroxaban  (Xarelto), explained a provider visit is necessary to discuss medication adjustments while taking Paxlovid. Is patient on any of the meds from List 2? Yes. Patient will be scheduled or transferred to a  at the end of this call.   Ember Montemayor RN        Additional Information   Negative: SEVERE difficulty breathing (e.g., struggling for each breath, speaks in single words)   Negative: Difficult to awaken or acting confused (e.g., disoriented, slurred speech)   Negative: Bluish (or gray) lips or face now   Negative: Shock suspected (e.g., cold/pale/clammy skin, too weak to stand, low BP, rapid pulse)   Negative: Sounds like a life-threatening emergency to the triager   Negative: SEVERE or constant chest pain or pressure  (Exception: Mild central chest pain, present only when coughing.)   Negative: MODERATE difficulty breathing (e.g., speaks in phrases, SOB even at rest, pulse 100-120)   Negative: Headache and stiff neck (can't touch chin to chest)   Negative: Oxygen level (e.g., pulse oximetry) 90% or lower   Negative: Chest pain or pressure  (Exception: MILD central chest pain, present only when coughing.)   Negative: Drinking very little and dehydration suspected (e.g., no urine > 12 hours, very dry mouth, very lightheaded)   Negative: Patient sounds very sick or weak to the triager    Protocols used: Coronavirus (COVID-19) Diagnosed or [id number]

## 2023-12-13 ENCOUNTER — VIRTUAL VISIT (OUTPATIENT)
Dept: INTERNAL MEDICINE | Facility: CLINIC | Age: 63
End: 2023-12-13
Payer: MEDICARE

## 2023-12-13 DIAGNOSIS — U07.1 INFECTION DUE TO 2019 NOVEL CORONAVIRUS: Primary | ICD-10-CM

## 2023-12-13 PROCEDURE — 99213 OFFICE O/P EST LOW 20 MIN: CPT | Mod: 93 | Performed by: INTERNAL MEDICINE

## 2023-12-13 RX ORDER — ALBUTEROL SULFATE 90 UG/1
2 AEROSOL, METERED RESPIRATORY (INHALATION) EVERY 6 HOURS PRN
COMMUNITY
End: 2024-02-14

## 2023-12-13 NOTE — PROGRESS NOTES
Myriam is a 63 year old who is being evaluated via a billable telephone visit.      What phone number would you like to be contacted at?   How would you like to obtain your AVS? Cecily    Distant Location (provider location):  On-site    Assessment & Plan     Infection due to 2019 novel coronavirus  63-year-old woman who tested positive for COVID yesterday morning.  Telephone visit performed today to address treatment.  Symptoms are mostly sinus congestion and drainage.  Symptoms began with some chills late Monday and other URI symptoms began yesterday.  Only mild cough.  No new shortness of breath.  Fevers and chills have improved.  Tested positive on the morning of December 12.  She does have asthma but has not seen any worsening symptoms.  She normally uses Advair twice daily.  She has not needed any additional albuterol.  We discussed symptomatic treatment including using Tylenol, Mucinex, and Delsym cough syrup.  We also discussed the role of Paxlovid.  The antivirals may shorten the duration of her symptoms and prevent her symptoms from becoming more severe.  If her symptoms remain mild, there might be minimal benefit from starting Paxlovid.  It is reasonable for her to monitor her symptoms for the next 48 hours and if they seem to be resolving significantly, starting the medication would not make much sense.  With that said, if she does not see significant change and certainly if she sees any increasing respiratory symptoms, she should definitely begin the medication.  She understands that there is a 5-day window when the Paxlovid will be effective.  We discussed her other medications.  She will need to hold rosuvastatin for the 5 days.  She should cut back on her amlodipine to only 2.5 mg or simply hold the medication for the 5 days.  Her blood pressure should be adequately controlled with irbesartan.  Salmeterol is contraindicated and rather than using Advair, I am suggesting using her albuterol inhaler 2  "puffs twice daily along with every 6 hours as needed during the 5 days.  - nirmatrelvir and ritonavir (PAXLOVID) 300 mg/100 mg therapy pack; Take 3 tablets by mouth 2 times daily for 5 days Hold rosuvastatin and amlodipine while on the medication             BMI:   Estimated body mass index is 34.28 kg/m  as calculated from the following:    Height as of 6/15/23: 1.626 m (5' 4\").    Weight as of 6/15/23: 90.6 kg (199 lb 11.2 oz).       See Patient Instructions    Héctor Camarena MD  [facility]    Nohemi Miguel is a 63 year old, presenting for the following health issues:  covid treatment (Spoke with RN yesterday for treatment and had to have appt with MD due to medication conflict)      12/13/2023     8:58 AM   Additional Questions   Roomed by Marie CHEATHAM       Review of Systems   No increasing shortness of breath or chest pain      Objective           Vitals:  No vitals were obtained today due to virtual visit.    Physical Exam   Exam limited by telephone visit.  She does not sound cutely short of breath able to complete full sentences without any difficulty    Phone call duration: 22 minutes      "

## 2024-02-06 DIAGNOSIS — Z91.09 MULTIPLE ENVIRONMENTAL ALLERGIES: ICD-10-CM

## 2024-02-06 DIAGNOSIS — J47.9 BRONCHIECTASIS WITHOUT ACUTE EXACERBATION (H): ICD-10-CM

## 2024-02-07 RX ORDER — CEPHALEXIN 250 MG/1
1 CAPSULE ORAL EVERY 12 HOURS
Qty: 60 EACH | Refills: 0 | Status: SHIPPED | OUTPATIENT
Start: 2024-02-07 | End: 2024-02-14

## 2024-02-14 ENCOUNTER — OFFICE VISIT (OUTPATIENT)
Dept: INTERNAL MEDICINE | Facility: CLINIC | Age: 64
End: 2024-02-14
Payer: MEDICARE

## 2024-02-14 VITALS
HEIGHT: 64 IN | SYSTOLIC BLOOD PRESSURE: 137 MMHG | RESPIRATION RATE: 10 BRPM | DIASTOLIC BLOOD PRESSURE: 72 MMHG | BODY MASS INDEX: 33.97 KG/M2 | OXYGEN SATURATION: 99 % | WEIGHT: 199 LBS | HEART RATE: 69 BPM | TEMPERATURE: 98.1 F

## 2024-02-14 DIAGNOSIS — J47.9 BRONCHIECTASIS WITHOUT ACUTE EXACERBATION (H): ICD-10-CM

## 2024-02-14 DIAGNOSIS — G47.33 OBSTRUCTIVE SLEEP APNEA: Primary | ICD-10-CM

## 2024-02-14 DIAGNOSIS — Z91.09 MULTIPLE ENVIRONMENTAL ALLERGIES: ICD-10-CM

## 2024-02-14 DIAGNOSIS — H91.90 HEARING LOSS, UNSPECIFIED HEARING LOSS TYPE, UNSPECIFIED LATERALITY: ICD-10-CM

## 2024-02-14 PROCEDURE — 99213 OFFICE O/P EST LOW 20 MIN: CPT | Performed by: INTERNAL MEDICINE

## 2024-02-14 RX ORDER — FLUTICASONE PROPIONATE AND SALMETEROL 100; 50 UG/1; UG/1
1 POWDER RESPIRATORY (INHALATION) EVERY 12 HOURS
Qty: 60 EACH | Refills: 11 | Status: SHIPPED | OUTPATIENT
Start: 2024-02-14

## 2024-02-14 NOTE — PROGRESS NOTES
1. Obstructive sleep apnea  We have renewed her orders for CPAP.  She is compliant and needs her CPAP nightly to sleep.  If we run into any problems we will make a referral over to the sleep clinic to establish care.  - CPAP Order for DME - ONLY FOR DME  - Adult Sleep Eval & Management  Referral; Future    2. Hearing loss, unspecified hearing loss type, unspecified laterality  Refer to audiology for baseline testing.  I do not know if she will be a candidate for hearing aids at this time or not.  - Adult Audiology  Referral; Future    3. Bronchiectasis without acute exacerbation (H)  Renewed her Advair  - fluticasone-salmeterol (ADVAIR DISKUS) 100-50 MCG/ACT inhaler; Inhale 1 puff into the lungs every 12 hours  Dispense: 60 each; Refill: 11    4. Multiple environmental allergies  As above.  She tells me she is had an asthma attack here recently.  - fluticasone-salmeterol (ADVAIR DISKUS) 100-50 MCG/ACT inhaler; Inhale 1 puff into the lungs every 12 hours  Dispense: 60 each; Refill: 11     Subjective   Myriam is a 63 year old, presenting for the following health issues:  Orders (Needs supplies for CPAP machine ([facility] Fax:[phone]) ) and Hearing Problem (Notes some background noise - possible referral to see Audiology )      2/14/2024     8:06 AM   Additional Questions   Roomed by THEO Branham   Accompanied by Self     Hearing Problem    History of Present Illness       Reason for visit:  Cpap supply prescription and office notes    She eats 2-3 servings of fruits and vegetables daily.She consumes 0 sweetened beverage(s) daily.She exercises with enough effort to increase her heart rate 9 or less minutes per day.  She exercises with enough effort to increase her heart rate 3 or less days per week.   She is taking medications regularly.           Myriam has a history of sleep apnea.  She states she is never seen a sleep provider.  She had a sleep study and apparently her prior MD done  "prescribed the CPAP and supplies.  She is very compliant with the CPAP.  She uses it nightly all night long.  She does not know her settings.  She needs a new machine however.  She notes hearing loss.  When there is background noise she has a hard time hearing people talk.  She denies other somatic concerns for me today.  She is trying to be more social and has been joining some more groups.  She had COVID after her trip to [place].  Did well with that.          Objective    /72 (BP Location: Left arm, Patient Position: Sitting, Cuff Size: Adult Regular)   Pulse 69   Temp 98.1  F (36.7  C) (Tympanic)   Resp 10   Ht 1.626 m (5' 4\")   Wt 90.3 kg (199 lb)   LMP  (LMP Unknown)   SpO2 99%   BMI 34.16 kg/m    Body mass index is 34.16 kg/m .  Physical Exam   Delightful woman.  She looks well.            Signed Electronically by: RICARDO KESSLER MD    "

## 2024-06-16 SDOH — HEALTH STABILITY: PHYSICAL HEALTH: ON AVERAGE, HOW MANY DAYS PER WEEK DO YOU ENGAGE IN MODERATE TO STRENUOUS EXERCISE (LIKE A BRISK WALK)?: 0 DAYS

## 2024-06-16 ASSESSMENT — SOCIAL DETERMINANTS OF HEALTH (SDOH): HOW OFTEN DO YOU GET TOGETHER WITH FRIENDS OR RELATIVES?: MORE THAN THREE TIMES A WEEK

## 2024-06-17 ENCOUNTER — OFFICE VISIT (OUTPATIENT)
Dept: INTERNAL MEDICINE | Facility: CLINIC | Age: 64
End: 2024-06-17
Payer: MEDICARE

## 2024-06-17 VITALS
BODY MASS INDEX: 33.8 KG/M2 | HEART RATE: 76 BPM | OXYGEN SATURATION: 99 % | TEMPERATURE: 97.8 F | HEIGHT: 64 IN | WEIGHT: 198 LBS | SYSTOLIC BLOOD PRESSURE: 126 MMHG | RESPIRATION RATE: 16 BRPM | DIASTOLIC BLOOD PRESSURE: 79 MMHG

## 2024-06-17 DIAGNOSIS — J30.89 NON-SEASONAL ALLERGIC RHINITIS, UNSPECIFIED TRIGGER: ICD-10-CM

## 2024-06-17 DIAGNOSIS — I25.10 ASYMPTOMATIC ARTERIOSCLEROSIS OF CORONARY ARTERY: ICD-10-CM

## 2024-06-17 DIAGNOSIS — I10 ESSENTIAL HYPERTENSION: ICD-10-CM

## 2024-06-17 DIAGNOSIS — Z00.00 WELCOME TO MEDICARE PREVENTIVE VISIT: Primary | ICD-10-CM

## 2024-06-17 DIAGNOSIS — J47.9 BRONCHIECTASIS WITHOUT ACUTE EXACERBATION (H): ICD-10-CM

## 2024-06-17 DIAGNOSIS — G47.33 OBSTRUCTIVE SLEEP APNEA: ICD-10-CM

## 2024-06-17 LAB
ALBUMIN SERPL BCG-MCNC: 4.7 G/DL (ref 3.5–5.2)
ALBUMIN UR-MCNC: NEGATIVE MG/DL
ALP SERPL-CCNC: 80 U/L (ref 40–150)
ALT SERPL W P-5'-P-CCNC: 21 U/L (ref 0–50)
ANION GAP SERPL CALCULATED.3IONS-SCNC: 13 MMOL/L (ref 7–15)
APPEARANCE UR: CLEAR
AST SERPL W P-5'-P-CCNC: 25 U/L (ref 0–45)
ATRIAL RATE - MUSE: 68 BPM
BILIRUB SERPL-MCNC: 0.7 MG/DL
BILIRUB UR QL STRIP: NEGATIVE
BUN SERPL-MCNC: 7.2 MG/DL (ref 8–23)
CALCIUM SERPL-MCNC: 9.9 MG/DL (ref 8.8–10.2)
CHLORIDE SERPL-SCNC: 104 MMOL/L (ref 98–107)
CHOLEST SERPL-MCNC: 154 MG/DL
COLOR UR AUTO: YELLOW
CREAT SERPL-MCNC: 0.72 MG/DL (ref 0.51–0.95)
DEPRECATED HCO3 PLAS-SCNC: 25 MMOL/L (ref 22–29)
DIASTOLIC BLOOD PRESSURE - MUSE: NORMAL MMHG
EGFRCR SERPLBLD CKD-EPI 2021: >90 ML/MIN/1.73M2
ERYTHROCYTE [DISTWIDTH] IN BLOOD BY AUTOMATED COUNT: 13.5 % (ref 10–15)
FASTING STATUS PATIENT QL REPORTED: NO
FASTING STATUS PATIENT QL REPORTED: NO
GLUCOSE SERPL-MCNC: 95 MG/DL (ref 70–99)
GLUCOSE UR STRIP-MCNC: NEGATIVE MG/DL
HCT VFR BLD AUTO: 45.6 % (ref 35–47)
HDLC SERPL-MCNC: 61 MG/DL
HGB BLD-MCNC: 14.8 G/DL (ref 11.7–15.7)
HGB UR QL STRIP: NEGATIVE
INTERPRETATION ECG - MUSE: NORMAL
KETONES UR STRIP-MCNC: NEGATIVE MG/DL
LDLC SERPL CALC-MCNC: 68 MG/DL
LEUKOCYTE ESTERASE UR QL STRIP: NEGATIVE
MCH RBC QN AUTO: 28.4 PG (ref 26.5–33)
MCHC RBC AUTO-ENTMCNC: 32.5 G/DL (ref 31.5–36.5)
MCV RBC AUTO: 87 FL (ref 78–100)
NITRATE UR QL: NEGATIVE
NONHDLC SERPL-MCNC: 93 MG/DL
P AXIS - MUSE: 17 DEGREES
PH UR STRIP: 6.5 [PH] (ref 5–8)
PLATELET # BLD AUTO: 264 10E3/UL (ref 150–450)
POTASSIUM SERPL-SCNC: 4.2 MMOL/L (ref 3.4–5.3)
PR INTERVAL - MUSE: 158 MS
PROT SERPL-MCNC: 8.2 G/DL (ref 6.4–8.3)
QRS DURATION - MUSE: 72 MS
QT - MUSE: 384 MS
QTC - MUSE: 408 MS
R AXIS - MUSE: 3 DEGREES
RBC # BLD AUTO: 5.22 10E6/UL (ref 3.8–5.2)
SODIUM SERPL-SCNC: 142 MMOL/L (ref 135–145)
SP GR UR STRIP: 1.01 (ref 1–1.03)
SYSTOLIC BLOOD PRESSURE - MUSE: NORMAL MMHG
T AXIS - MUSE: 20 DEGREES
TRIGL SERPL-MCNC: 127 MG/DL
UROBILINOGEN UR STRIP-ACNC: 0.2 E.U./DL
VENTRICULAR RATE- MUSE: 68 BPM
WBC # BLD AUTO: 7.2 10E3/UL (ref 4–11)

## 2024-06-17 PROCEDURE — G0404 EKG TRACING FOR INITIAL PREV: HCPCS | Performed by: INTERNAL MEDICINE

## 2024-06-17 PROCEDURE — 99214 OFFICE O/P EST MOD 30 MIN: CPT | Mod: 25 | Performed by: INTERNAL MEDICINE

## 2024-06-17 PROCEDURE — 80053 COMPREHEN METABOLIC PANEL: CPT | Performed by: INTERNAL MEDICINE

## 2024-06-17 PROCEDURE — 85027 COMPLETE CBC AUTOMATED: CPT | Performed by: INTERNAL MEDICINE

## 2024-06-17 PROCEDURE — G0405 EKG INTERPRET & REPORT PREVE: HCPCS | Mod: OFF | Performed by: GENERAL ACUTE CARE HOSPITAL

## 2024-06-17 PROCEDURE — G0402 INITIAL PREVENTIVE EXAM: HCPCS | Performed by: INTERNAL MEDICINE

## 2024-06-17 PROCEDURE — 36415 COLL VENOUS BLD VENIPUNCTURE: CPT | Performed by: INTERNAL MEDICINE

## 2024-06-17 PROCEDURE — 80061 LIPID PANEL: CPT | Performed by: INTERNAL MEDICINE

## 2024-06-17 PROCEDURE — 81003 URINALYSIS AUTO W/O SCOPE: CPT | Performed by: INTERNAL MEDICINE

## 2024-06-17 NOTE — PATIENT INSTRUCTIONS
"Patient Education   Eating Healthy Foods: Care Instructions  With every meal, you can make healthy food choices. Try to eat a variety of fruits, vegetables, whole grains, lean proteins, and low-fat dairy products. This can help you get the right balance of nutrients, including vitamins and minerals. Small changes add up over time. You can start by adding one healthy food to your meals each day.    Try to make half your plate fruits and vegetables, one-fourth whole grains, and one-fourth lean proteins. Try including dairy with your meals.   Eat more fruits and vegetables. Try to have them with most meals and snacks.   Foods for healthy eating    Fruits    These can be fresh, frozen, canned, or dried.  Try to choose whole fruit rather than fruit juice.  Eat a variety of colors.    Vegetables    These can be fresh, frozen, canned, or dried.  Beans, peas, and lentils count too.    Whole grains    Choose whole-grain breads, cereals, and noodles.  Try brown rice.    Lean proteins    These can include lean meat, poultry, fish, and eggs.  You can also have tofu, beans, peas, lentils, nuts, and seeds.    Dairy    Try milk, yogurt, and cheese.  Choose low-fat or fat-free when you can.  If you need to, use lactose-free milk or fortified plant-based milk products, such as soy milk.    Water    Drink water when you're thirsty.  Limit sugar-sweetened drinks, including soda, fruit drinks, and sports drinks.  Where can you learn more?  Go to https://www.[employer].net/patiented  Enter T756 in the search box to learn more about \"Eating Healthy Foods: Care Instructions.\"  Current as of: September 20, 2023               Content Version: 14.0    5116-8569 [employer].   Care instructions adapted under license by your healthcare professional. If you have questions about a medical condition or this instruction, always ask your healthcare professional. [employer] disclaims any warranty or liability for your use " of this information.      Body Mass Index: Care Instructions  Overview     Body mass index (BMI) can help you see if your weight is raising your risk for health problems. It uses a formula to compare how much you weigh with how tall you are.  A BMI lower than 18.5 is considered underweight.  A BMI between 18.5 and 24.9 is considered healthy.  A BMI between 25 and 29.9 is considered overweight. A BMI of 30 or higher is considered obese.  If your BMI is in the normal range, it means that you have a lower risk for weight-related health problems. If your BMI is in the overweight or obese range, you may be at increased risk for weight-related health problems, such as high blood pressure, heart disease, stroke, arthritis or joint pain, and diabetes. If your BMI is in the underweight range, you may be at increased risk for health problems such as fatigue, lower protection (immunity) against illness, muscle loss, bone loss, hair loss, and hormone problems.  BMI is just one measure of your risk for weight-related health problems. You may be at higher risk for health problems if you are not active, you eat an unhealthy diet, or you drink too much alcohol or use tobacco products.  Follow-up care is a key part of your treatment and safety. Be sure to make and go to all appointments, and call your doctor if you are having problems. It's also a good idea to know your test results and keep a list of the medicines you take.  How can you care for yourself at home?  Practice healthy eating habits. This includes eating plenty of fruits, vegetables, whole grains, lean protein, and low-fat dairy.  If your doctor recommends it, get more exercise. Walking is a good choice. Bit by bit, increase the amount you walk every day. Try for at least 30 minutes on most days of the week.  Do not smoke. Smoking can increase your risk for health problems. If you need help quitting, talk to your doctor about stop-smoking programs and medicines. These  "can increase your chances of quitting for good.  Limit alcohol to 2 drinks a day for men and 1 drink a day for women. Too much alcohol can cause health problems.  If you have a BMI higher than 25  Your doctor may do other tests to check your risk for weight-related health problems. This may include measuring the distance around your waist. A waist measurement of more than 40 inches in men or 35 inches in women can increase the risk of weight-related health problems.  Talk with your doctor about steps you can take to stay healthy or improve your health. You may need to make lifestyle changes to lose weight and stay healthy, such as changing your diet and getting regular exercise.  If you have a BMI lower than 18.5  Your doctor may do other tests to check your risk for health problems.  Talk with your doctor about steps you can take to stay healthy or improve your health. You may need to make lifestyle changes to gain or maintain weight and stay healthy, such as getting more healthy foods in your diet and doing exercises to build muscle.  Where can you learn more?  Go to https://www.[employer].net/patiented  Enter S176 in the search box to learn more about \"Body Mass Index: Care Instructions.\"  Current as of: May 13, 2023               Content Version: 14.0    7205-3002 [employer].   Care instructions adapted under license by your healthcare professional. If you have questions about a medical condition or this instruction, always ask your healthcare professional. [employer] disclaims any warranty or liability for your use of this information.      Preventive Care Advice   This is general advice we often give to help people stay healthy. Your care team may have specific advice just for you. Please talk to your care team about your own preventive care needs.  Lifestyle  Exercise at least 150 minutes each week (30 minutes a day, 5 days a week).  Do muscle strengthening activities 2 days a " "week. These help control your weight and prevent disease.  No smoking.  Wear sunscreen to prevent skin cancer.  Have your home tested for radon every 2 to 5 years. Radon is a colorless, odorless gas that can harm your lungs. To learn more, go to www.health.state.[place]. and search for \"Radon in Homes.\"  Keep guns unloaded and locked up in a safe place like a safe or gun vault, or, use a gun lock and hide the keys. Always lock away bullets separately. To learn more, visit [employer].[place].gov and search for \"safe gun storage.\"  Nutrition  Eat 5 or more servings of fruits and vegetables each day.  Try wheat bread, brown rice and whole grain pasta (instead of white bread, rice, and pasta).  Get enough calcium and vitamin D. Check the label on foods and aim for 100% of the RDA (recommended daily allowance).  Regular exams  Have a dental exam and cleaning every 6 months.  See your health care team every year to talk about:  Any changes in your health.  Any medicines your care team has prescribed.  Preventive care, family planning, and ways to prevent chronic diseases.  Shots (vaccines)   HPV shots (up to age 26), if you've never had them before.  Hepatitis B shots (up to age 59), if you've never had them before.  COVID-19 shot: Get this shot when it's due.  Flu shot: Get a flu shot every year.  Tetanus shot: Get a tetanus shot every 10 years.  Pneumococcal, hepatitis A, and RSV shots: Ask your care team if you need these based on your risk.  Shingles shot (for age 50 and up).  General health tests  Diabetes screening:  Starting at age 35, Get screened for diabetes at least every 3 years.  If you are younger than age 35, ask your care team if you should be screened for diabetes.  Cholesterol test: At age 39, start having a cholesterol test every 5 years, or more often if advised.  Bone density scan (DEXA): At age 50, ask your care team if you should have this scan for osteoporosis (brittle bones).  Hepatitis C: Get tested at least " once in your life.  Abdominal aortic aneurysm screening: Talk to your doctor about having this screening if you:  Have ever smoked; and  Are biologically male; and  Are between the ages of 65 and 75.  STIs (sexually transmitted infections)  Before age 24: Ask your care team if you should be screened for STIs.  After age 24: Get screened for STIs if you're at risk. You are at risk for STIs (including HIV) if:  You are sexually active with more than one person.  You don't use condoms every time.  You or a partner was diagnosed with a sexually transmitted infection.  If you are at risk for HIV, ask about PrEP medicine to prevent HIV.  Get tested for HIV at least once in your life, whether you are at risk for HIV or not.  Cancer screening tests  Cervical cancer screening: If you have a cervix, begin getting regular cervical cancer screening tests at age 21. Most people who have regular screenings with normal results can stop after age 65. Talk about this with your provider.  Breast cancer scan (mammogram): If you've ever had breasts, begin having regular mammograms starting at age 40. This is a scan to check for breast cancer.  Colon cancer screening: It is important to start screening for colon cancer at age 45.  Have a colonoscopy test every 10 years (or more often if you're at risk) Or, ask your provider about stool tests like a FIT test every year or Cologuard test every 3 years.  To learn more about your testing options, visit: www.[employer]/027724.pdf.  For help making a decision, visit: chris/uw88889.  Prostate cancer screening test: If you have a prostate and are age 55 to 69, ask your provider if you would benefit from a yearly prostate cancer screening test.  Lung cancer screening: If you are a current or former smoker age 50 to 80, ask your care team if ongoing lung cancer screenings are right for you.  For informational purposes only. Not to replace the advice of your health care provider. Copyright    2023 [facility]. All rights reserved. Clinically reviewed by the [facility] Transitions Program. [employer] 774324 - REV 04/24.  Hearing Loss: Care Instructions  Overview     Hearing loss is a sudden or slow decrease in how well you hear. It can range from slight to profound. Permanent hearing loss can occur with aging. It also can happen when you are exposed long-term to loud noise. Examples include listening to loud music, riding motorcycles, or being around other loud machines.  Hearing loss can affect your work and home life. It can make you feel lonely or depressed. You may feel that you have lost your independence. But hearing aids and other devices can help you hear better and feel connected to others.  Follow-up care is a key part of your treatment and safety. Be sure to make and go to all appointments, and call your doctor if you are having problems. It's also a good idea to know your test results and keep a list of the medicines you take.  How can you care for yourself at home?  Avoid loud noises whenever possible. This helps keep your hearing from getting worse.  Always wear hearing protection around loud noises.  Wear a hearing aid as directed.  A professional can help you pick a hearing aid that will work best for you.  You can also get hearing aids over the counter for mild to moderate hearing loss.  Have hearing tests as your doctor suggests. They can show whether your hearing has changed. Your hearing aid may need to be adjusted.  Use other devices as needed. These may include:  Telephone amplifiers and hearing aids that can connect to a television, stereo, radio, or microphone.  Devices that use lights or vibrations. These alert you to the doorbell, a ringing telephone, or a baby monitor.  Television closed-captioning. This shows the words at the bottom of the screen. Most new TVs can do this.  TTY (text telephone). This lets you type messages back and forth on the telephone  "instead of talking or listening. These devices are also called TDD. When messages are typed on the keyboard, they are sent over the phone line to a receiving TTY. The message is shown on a monitor.  Use text messaging, social media, and email if it is hard for you to communicate by telephone.  Try to learn a listening technique called speechreading. It is not lipreading. You pay attention to people's gestures, expressions, posture, and tone of voice. These clues can help you understand what a person is saying. Face the person you are talking to, and have them face you. Make sure the lighting is good. You need to see the other person's face clearly.  Think about counseling if you need help to adjust to your hearing loss.  When should you call for help?  Watch closely for changes in your health, and be sure to contact your doctor if:    You think your hearing is getting worse.     You have new symptoms, such as dizziness or nausea.   Where can you learn more?  Go to https://www.[employer].net/patiented  Enter R798 in the search box to learn more about \"Hearing Loss: Care Instructions.\"  Current as of: September 27, 2023               Content Version: 14.0    2011-7422 [employer].   Care instructions adapted under license by your healthcare professional. If you have questions about a medical condition or this instruction, always ask your healthcare professional. [employer] disclaims any warranty or liability for your use of this information.         "

## 2024-06-17 NOTE — PROGRESS NOTES
Preventive Care Visit  [facility]  RICARDO KESSLER MD, Internal Medicine  Jun 17, 2024      1. Welcome to Medicare preventive visit  She is up-to-date on her health maintenance although she is due for colonoscopy later this year and she does qualify for COVID-vaccine today but she wants to hold off until the fall.  I have urged her to remain active.  I told her that as she enters her senior years activity is extremely important.  - EKG 12-lead, tracing only    2. Asymptomatic arteriosclerosis of coronary artery  Continue statin.  - Lipid panel reflex to direct LDL Non-fasting; Future  - Comprehensive metabolic panel (BMP + Alb, Alk Phos, ALT, AST, Total. Bili, TP); Future  - Lipid panel reflex to direct LDL Non-fasting  - Comprehensive metabolic panel (BMP + Alb, Alk Phos, ALT, AST, Total. Bili, TP)    3. Obstructive sleep apnea  Continue CPAP therapy.    4. Bronchiectasis without acute exacerbation (H)  Doing well with current Advair.  Continue same.  - CBC with platelets; Future  - CBC with platelets    5. Non-seasonal allergic rhinitis, unspecified trigger  Continue her Nasonex which seems to be working well for her.      6. Essential hypertension  Blood pressure well-controlled.  Continue same.  - UA Macroscopic with reflex to Microscopic and Culture - Lab Collect; Future  - UA Macroscopic with reflex to Microscopic and Culture - Lab Collect     Nohemi Miguel is a 63 year old, presenting for the following:  Medicare Visit (Fasting today )        6/17/2024     8:54 AM   Additional Questions   Roomed by THEO Branham   Accompanied by alone         6/17/2024     8:54 AM   Patient Reported Additional Medications   Patient reports taking the following new medications none        Health Care Directive  Patient does not have a Health Care Directive or Living Will: Patient states has Advance Directive and will bring in a copy to clinic.    HPI    Myriam comes in today for her welcome to Medicare.   She has a lot of aches and pains and does not do lot of exercise as a result.  Her allergies have been better.  She thinks maybe she does not smell as well since her COVID.  She has severe environmental allergies.  Her sleep has been stable.  Kids and grandkids are doing well.  She has 8 grandkids now.  [place]          6/16/2024   General Health   How would you rate your overall physical health? Good   Feel stress (tense, anxious, or unable to sleep) Only a little   (!) STRESS CONCERN      6/16/2024   Nutrition   Diet: Regular (no restrictions)         6/16/2024   Exercise   Days per week of moderate/strenous exercise 0 days   (!) EXERCISE CONCERN      6/16/2024   Social Factors   Frequency of gathering with friends or relatives More than three times a week   Worry food won't last until get money to buy more No   Food not last or not have enough money for food? No   Do you have housing?  Yes   Are you worried about losing your housing? No   Lack of transportation? No   Unable to get utilities (heat,electricity)? No         6/16/2024   Fall Risk   Fallen 2 or more times in the past year? No    No   Trouble with walking or balance? No    No          6/16/2024   Activities of Daily Living- Home Safety   Needs help with the following daily activites None of the above   Safety concerns in the home None of the above         6/16/2024   Dental   Dentist two times every year? Yes         6/16/2024   Hearing Screening   Hearing concerns? (!) I NEED TO ASK PEOPLE TO SPEAK UP OR REPEAT THEMSELVES.    (!) IT'S HARD TO FOLLOW A CONVERSATION IN A NOISY RESTAURANT OR CROWDED ROOM.         6/16/2024   Driving Risk Screening   Patient/family members have concerns about driving No         6/16/2024   General Alertness/Fatigue Screening   Have you been more tired than usual lately? No         6/16/2024   Urinary Incontinence Screening   Bothered by leaking urine in past 6 months No         6/16/2024    TB Screening   Were you born outside of the [place]? No         Today's PHQ-2 Score:       6/16/2024     8:16 PM   PHQ-2 ( 1999 Pfizer)   Q1: Little interest or pleasure in doing things 0   Q2: Feeling down, depressed or hopeless 0   PHQ-2 Score 0   Q1: Little interest or pleasure in doing things Not at all   Q2: Feeling down, depressed or hopeless Not at all   PHQ-2 Score 0           6/16/2024   Substance Use   Alcohol more than 3/day or more than 7/wk No   Do you have a current opioid prescription? No   How severe/bad is pain from 1 to 10? 2/10   Do you use any other substances recreationally? No     Social History     Tobacco Use    Smoking status: Never     Passive exposure: Never    Smokeless tobacco: Never   Vaping Use    Vaping status: Never Used   Substance Use Topics    Alcohol use: Yes     Alcohol/week: 7.0 standard drinks of alcohol           6/15/2023   LAST FHS-7 RESULTS   1st degree relative breast or ovarian cancer No   Any relative bilateral breast cancer No   Any male have breast cancer No   Any ONE woman have BOTH breast AND ovarian cancer No   Any woman with breast cancer before 50yrs No   2 or more relatives with breast AND/OR ovarian cancer  No   2 or more relatives with breast AND/OR bowel cancer No        Mammogram Screening - Mammogram every 1-2 years updated in Health Maintenance based on mutual decision making      History of abnormal Pap smear:         2/1/2023    12:11 PM 4/9/2019    12:00 AM   PAP / HPV   PAP Negative for Intraepithelial Lesion or Malignancy (NILM)     HPV_EXT - HISTORICAL  See Scanned Report      ASCVD Risk   The 10-year ASCVD risk score (Malu LINN, et al., 2019) is: 6.6%    Values used to calculate the score:      Age: 63 years      Sex: Female      Is Non- : No      Diabetic: No      Tobacco smoker: No      Systolic Blood Pressure: 126 mmHg      Is BP treated: Yes      HDL Cholesterol: 50 mg/dL      Total Cholesterol: 154  "mg/dL            Reviewed and updated as needed this visit by Provider                      Current providers sharing in care for this patient include:  Patient Care Team:  Musa Mariano MD as PCP - General (Internal Medicine)  Musa Mariano MD as Assigned PCP  Fernie Hart AuD (Audiology)  Leila Verma MD as MD (Otolaryngology)    The following health maintenance items are reviewed in Epic and correct as of today:  Health Maintenance   Topic Date Due    COVID-19 Vaccine (7 - 2023-24 season) 02/10/2024    LIPID  06/15/2024    ANNUAL REVIEW OF HM ORDERS  06/15/2024    MEDICARE ANNUAL WELLNESS VISIT  06/15/2024    COLORECTAL CANCER SCREENING  11/03/2024    FALL RISK ASSESSMENT  06/17/2025    MAMMO SCREENING  07/25/2025    GLUCOSE  06/15/2026    DTAP/TDAP/TD IMMUNIZATION (3 - Td or Tdap) 02/06/2028    ADVANCE CARE PLANNING  06/15/2028    DEXA  04/09/2034    HEPATITIS C SCREENING  Completed    HIV SCREENING  Completed    PHQ-2 (once per calendar year)  Completed    INFLUENZA VACCINE  Completed    Pneumococcal Vaccine: 65+ Years  Completed    ZOSTER IMMUNIZATION  Completed    RSV VACCINE (Pregnancy & 60+)  Completed    IPV IMMUNIZATION  Aged Out    HPV IMMUNIZATION  Aged Out    MENINGITIS IMMUNIZATION  Aged Out    RSV MONOCLONAL ANTIBODY  Aged Out    PAP  Discontinued         Review of Systems  Constitutional, HEENT, cardiovascular, pulmonary, gi and gu systems are negative, except as otherwise noted.     Objective    Exam  /79 (BP Location: Left arm, Patient Position: Sitting, Cuff Size: Adult Regular)   Pulse 76   Temp 97.8  F (36.6  C) (Tympanic)   Resp 16   Ht 1.619 m (5' 3.75\")   Wt 89.8 kg (198 lb)   LMP  (LMP Unknown)   SpO2 99%   BMI 34.25 kg/m     Estimated body mass index is 34.25 kg/m  as calculated from the following:    Height as of this encounter: 1.619 m (5' 3.75\").    Weight as of this encounter: 89.8 kg (198 lb).    Physical Exam  GENERAL: alert and no distress  EYES: Eyes " grossly normal to inspection, PERRL and conjunctivae and sclerae normal  HENT: ear canals and TM's normal, nose and mouth without ulcers or lesions  NECK: no adenopathy, no asymmetry, masses, or scars  RESP: lungs clear to auscultation - no rales, rhonchi or wheezes  CV: regular rate and rhythm, normal S1 S2, no S3 or S4, no murmur, click or rub, no peripheral edema  ABDOMEN: soft, nontender, no hepatosplenomegaly, no masses and bowel sounds normal  MS: no gross musculoskeletal defects noted, no edema  SKIN: no suspicious lesions or rashes  NEURO: Normal strength and tone, mentation intact and speech normal  PSYCH: mentation appears normal, affect normal/bright        6/17/2024   Mini Cog   Clock Draw Score 2 Normal   3 Item Recall 3 objects recalled   Mini Cog Total Score 5         Vision Screen  Vision Screen Results: Pass      Signed Electronically by: RICARDO KESSLER MD

## 2024-06-25 DIAGNOSIS — I10 ESSENTIAL HYPERTENSION: ICD-10-CM

## 2024-06-25 DIAGNOSIS — J47.9 BRONCHIECTASIS WITHOUT ACUTE EXACERBATION (H): ICD-10-CM

## 2024-06-26 RX ORDER — AMLODIPINE BESYLATE 5 MG/1
5 TABLET ORAL DAILY
Qty: 90 TABLET | Refills: 2 | Status: SHIPPED | OUTPATIENT
Start: 2024-06-26

## 2024-06-26 RX ORDER — IRBESARTAN 300 MG/1
300 TABLET ORAL DAILY
Qty: 90 TABLET | Refills: 2 | Status: SHIPPED | OUTPATIENT
Start: 2024-06-26

## 2024-06-27 ENCOUNTER — TRANSFERRED RECORDS (OUTPATIENT)
Dept: HEALTH INFORMATION MANAGEMENT | Facility: CLINIC | Age: 64
End: 2024-06-27
Payer: COMMERCIAL

## 2024-07-01 RX ORDER — ALBUTEROL SULFATE 90 UG/1
2 AEROSOL, METERED RESPIRATORY (INHALATION) EVERY 6 HOURS
Qty: 18 G | Refills: 3 | Status: SHIPPED | OUTPATIENT
Start: 2024-07-01

## 2024-07-01 NOTE — TELEPHONE ENCOUNTER
6/28/24 MyChart message    Pt states she uses it as a PRN during milk weed season and needs to have on hand as she cannot be around very perfume stuff - pt requested Carrol give her a call     Carrol is currently out of the clinic this week.  Sending refill request to PCP.

## 2024-07-01 NOTE — TELEPHONE ENCOUNTER
Patient calling back stating she was talking with someone earlier today regarding this but was put on hold. States she was hoping to get this refilled today. Writer informed her that the prescriptions were signed earlier this morning. She verbalized understanding and has no further questions.

## 2024-09-01 DIAGNOSIS — J30.89 NON-SEASONAL ALLERGIC RHINITIS DUE TO OTHER ALLERGIC TRIGGER: ICD-10-CM

## 2024-09-04 RX ORDER — MOMETASONE FUROATE MONOHYDRATE 50 UG/1
2 SPRAY, METERED NASAL DAILY
Qty: 17 G | Refills: 2 | Status: SHIPPED | OUTPATIENT
Start: 2024-09-04

## 2024-09-04 NOTE — TELEPHONE ENCOUNTER
Patient reports that she uses the nasonex only in the fall time due to increased allergies. Not using this medication year round.

## 2024-09-04 NOTE — PROGRESS NOTES
No chief complaint on file.     PCP: Musa Mariano     Referring Provider: Musa Mariano    LMP  (LMP Unknown)     ENT Problem List:  Patient Active Problem List   Diagnosis    Osteoarthritis Of The Knee    Serum Total Cholesterol Was Elevated; CT Calcium Score 3 4/19/17    Essential hypertension    Obstructive sleep apnea    Asymptomatic arteriosclerosis of coronary artery    Bronchiectasis without acute exacerbation (H)    Non-seasonal allergic rhinitis    BCC (basal cell carcinoma), face      Current Medications:  Current Outpatient Medications   Medication Sig Dispense Refill    albuterol (VENTOLIN HFA) 108 (90 Base) MCG/ACT inhaler Inhale 2 puffs into the lungs every 6 hours 18 g 3    amLODIPine (NORVASC) 5 MG tablet Take 1 tablet (5 mg) by mouth daily 90 tablet 2    ASPIRIN NOT PRESCRIBED (INTENTIONAL) Please choose reason not prescribed from choices below.      cholecalciferol 25 MCG (1000 UT) TABS Take 2,000 Units by mouth daily      fluticasone-salmeterol (ADVAIR DISKUS) 100-50 MCG/ACT inhaler Inhale 1 puff into the lungs every 12 hours 60 each 11    irbesartan (AVAPRO) 300 MG tablet Take 1 tablet (300 mg) by mouth daily 90 tablet 2    mometasone (NASONEX) 50 MCG/ACT nasal spray SHAKE LIQUID AND USE 2 SPRAYS IN EACH NOSTRIL DAILY 17 g 2    rosuvastatin (CRESTOR) 10 MG tablet Take 1 tablet (10 mg) by mouth daily 90 tablet 3     No current facility-administered medications for this visit.     HPI  Pleasant 64 year old female presents today as a new patient for hearing loss. She has been experiencing hearing loss for a couple of years. She is not able to discern asymmetry between sides, but has noticed that she has to focus more whenever there is background sound. She has been having difficulty with word understanding over the past few months. She has a history of TMJ and sinus issues. She had sinus surgery many years ago. She has a tendency to get bleeding and scabs from dryness in her nose. She began using  Nasonex for her environmental allergies and reports that it has been helpful. She is allergic to ragweed. She has no history of having head trauma or noise exposure. She gets occasional tinnitus. She denies pain and drainage of the ears.    Review of Systems   Constitutional: Negative.    HENT:  Positive for tinnitus. Negative for congestion, ear discharge, ear pain and sinus pain.    Eyes: Negative.  Negative for blurred vision and double vision.   Respiratory: Negative.     Gastrointestinal: Negative.    Musculoskeletal: Negative.    Skin: Negative.    Neurological: Negative.  Negative for dizziness, tingling and weakness.   Endo/Heme/Allergies:  Positive for environmental allergies.   All other systems reviewed and are negative.      Physical Exam  Vitals and nursing note reviewed.   Constitutional:       Appearance: Normal appearance.   HENT:      Head: Normocephalic and atraumatic.      Right Ear: Tympanic membrane, ear canal and external ear normal. No middle ear effusion.      Left Ear: Tympanic membrane, ear canal and external ear normal.  No middle ear effusion.      Nose: Septal deviation and congestion present.      Right Turbinates: Swollen.      Left Turbinates: Swollen.      Mouth/Throat:      Mouth: Mucous membranes are moist.   Eyes:      Extraocular Movements: Extraocular movements intact.      Pupils: Pupils are equal, round, and reactive to light.   Neurological:      Mental Status: She is alert.   Psychiatric:         Behavior: Behavior is cooperative.     + septal deviation (insignificant)  + Kiesselbach region lesion   + inferior nasal turbinate hypertrophy  + no signs of infection    AUDIOGRAM: The patient underwent an audiogram 09/11/2024.  Results: Hearing WNL bilaterally. 100% word rec. bilaterally. Tymps WNL. Reflexes as marked.  Right: Speech reception threshold is 15 dB with 100% word recognition. Tympanogram -- type.  Left: Speech reception threshold is 15 dB with 100% word recognition.  Tympanogram -- type.    A/P  This pleasant patient has presented with hearing loss and a Kiesselbach lesion with nasal congestion that may be complicated by inferior nasal hypertrophy and septal deviation. Audiogram/Imaging was independently reviewed and discussed in detail with the patient, which demonstrated that her hearing is within expected normal limits. The symptoms that she is describing is issues with word recognition and processing issues. Methods of focusing her listening. We may refer her to a Speech Pathologist for further consultation for listen process strategies.     Physical examination shown a lesion in the Kiesselbach region and some congestion of the inferior turbinates and septal deviation. Options including medical and surgical ones were discussed. Topical nasal sprays including mild steroids, antihistaminic Azelastine were reviewed. She has been using nasal spray for her allergies. She has been advised to continue the Nasonex for at least 6-8 weeks. For her occasional nasal bleeding, she has been advised to use saline gel to keep the nasal passages moisturized. If her symptoms do not improve, we can consider cauterization with silver nitrate in the future at her next visit.     Follow up in clinic in 6-8 weeks or as needed.    Scribe/Staff:    Scribe Disclosure:   I, Estella Paiz, am serving as a scribe; to document services personally performed by Leila Verma MD based on data collection and the provider's statements to me.     Provider Disclosure:  I agree with above History, Review of Systems, Physical exam and Plan.  I have reviewed the content of the documentation and have edited it as needed. I have personally performed the services documented here and the documentation accurately represents those services and the decisions I have made.      Electronically signed by:  Leila Verma MD

## 2024-09-06 DIAGNOSIS — I25.10 ASYMPTOMATIC ARTERIOSCLEROSIS OF CORONARY ARTERY: ICD-10-CM

## 2024-09-06 RX ORDER — ROSUVASTATIN CALCIUM 10 MG/1
10 TABLET, COATED ORAL DAILY
Qty: 90 TABLET | Refills: 2 | Status: SHIPPED | OUTPATIENT
Start: 2024-09-06

## 2024-09-11 ENCOUNTER — OFFICE VISIT (OUTPATIENT)
Dept: OTOLARYNGOLOGY | Facility: CLINIC | Age: 64
End: 2024-09-11
Payer: MEDICARE

## 2024-09-11 ENCOUNTER — OFFICE VISIT (OUTPATIENT)
Dept: AUDIOLOGY | Facility: CLINIC | Age: 64
End: 2024-09-11
Payer: MEDICARE

## 2024-09-11 DIAGNOSIS — H91.90 HEARING LOSS, UNSPECIFIED HEARING LOSS TYPE, UNSPECIFIED LATERALITY: ICD-10-CM

## 2024-09-11 DIAGNOSIS — H93.293 ABNORMAL AUDITORY PERCEPTION OF BOTH EARS: Primary | ICD-10-CM

## 2024-09-11 PROCEDURE — 92557 COMPREHENSIVE HEARING TEST: CPT | Performed by: AUDIOLOGIST

## 2024-09-11 PROCEDURE — 92550 TYMPANOMETRY & REFLEX THRESH: CPT | Performed by: AUDIOLOGIST

## 2024-09-11 PROCEDURE — 99203 OFFICE O/P NEW LOW 30 MIN: CPT | Performed by: OTOLARYNGOLOGY

## 2024-09-11 ASSESSMENT — ENCOUNTER SYMPTOMS
DIZZINESS: 0
CONSTITUTIONAL NEGATIVE: 1
DOUBLE VISION: 0
TINGLING: 0
BLURRED VISION: 0
GASTROINTESTINAL NEGATIVE: 1
WEAKNESS: 0
RESPIRATORY NEGATIVE: 1
MUSCULOSKELETAL NEGATIVE: 1
EYES NEGATIVE: 1
NEUROLOGICAL NEGATIVE: 1
SINUS PAIN: 0

## 2024-09-11 NOTE — PROGRESS NOTES
AUDIOLOGY REPORT    SUMMARY: Audiology visit completed. See audiogram for results.    RECOMMENDATIONS: Follow-up with ENT.    Michael Rice  Doctor of Audiology  [place] License # [id number]

## 2024-09-11 NOTE — LETTER
9/11/2024      Myriam Atkins  [address]  [place] [address]      Dear Colleague,    Thank you for referring your patient, Myriam Atkins, to the [facility]. Please see a copy of my visit note below.    No chief complaint on file.     PCP: Musa Mariano     Referring Provider: Musa Mariano    LMP  (LMP Unknown)     ENT Problem List:  Patient Active Problem List   Diagnosis     Osteoarthritis Of The Knee     Serum Total Cholesterol Was Elevated; CT Calcium Score 3 4/19/17     Essential hypertension     Obstructive sleep apnea     Asymptomatic arteriosclerosis of coronary artery     Bronchiectasis without acute exacerbation (H)     Non-seasonal allergic rhinitis     BCC (basal cell carcinoma), face      Current Medications:  Current Outpatient Medications   Medication Sig Dispense Refill     albuterol (VENTOLIN HFA) 108 (90 Base) MCG/ACT inhaler Inhale 2 puffs into the lungs every 6 hours 18 g 3     amLODIPine (NORVASC) 5 MG tablet Take 1 tablet (5 mg) by mouth daily 90 tablet 2     ASPIRIN NOT PRESCRIBED (INTENTIONAL) Please choose reason not prescribed from choices below.       cholecalciferol 25 MCG (1000 UT) TABS Take 2,000 Units by mouth daily       fluticasone-salmeterol (ADVAIR DISKUS) 100-50 MCG/ACT inhaler Inhale 1 puff into the lungs every 12 hours 60 each 11     irbesartan (AVAPRO) 300 MG tablet Take 1 tablet (300 mg) by mouth daily 90 tablet 2     mometasone (NASONEX) 50 MCG/ACT nasal spray SHAKE LIQUID AND USE 2 SPRAYS IN EACH NOSTRIL DAILY 17 g 2     rosuvastatin (CRESTOR) 10 MG tablet Take 1 tablet (10 mg) by mouth daily 90 tablet 3     No current facility-administered medications for this visit.     HPI  Pleasant 64 year old female presents today as a new patient for hearing loss. She has been experiencing hearing loss for a couple of years. She is not able to discern asymmetry between sides, but has noticed that she has to focus more whenever there is background sound.  She has been having difficulty with word understanding over the past few months. She has a history of TMJ and sinus issues. She had sinus surgery many years ago. She has a tendency to get bleeding and scabs from dryness in her nose. She began using Nasonex for her environmental allergies and reports that it has been helpful. She is allergic to ragweed. She has no history of having head trauma or noise exposure. She gets occasional tinnitus. She denies pain and drainage of the ears.    Review of Systems   Constitutional: Negative.    HENT:  Positive for tinnitus. Negative for congestion, ear discharge, ear pain and sinus pain.    Eyes: Negative.  Negative for blurred vision and double vision.   Respiratory: Negative.     Gastrointestinal: Negative.    Musculoskeletal: Negative.    Skin: Negative.    Neurological: Negative.  Negative for dizziness, tingling and weakness.   Endo/Heme/Allergies:  Positive for environmental allergies.   All other systems reviewed and are negative.      Physical Exam  Vitals and nursing note reviewed.   Constitutional:       Appearance: Normal appearance.   HENT:      Head: Normocephalic and atraumatic.      Right Ear: Tympanic membrane, ear canal and external ear normal. No middle ear effusion.      Left Ear: Tympanic membrane, ear canal and external ear normal.  No middle ear effusion.      Nose: Septal deviation and congestion present.      Right Turbinates: Swollen.      Left Turbinates: Swollen.      Mouth/Throat:      Mouth: Mucous membranes are moist.   Eyes:      Extraocular Movements: Extraocular movements intact.      Pupils: Pupils are equal, round, and reactive to light.   Neurological:      Mental Status: She is alert.   Psychiatric:         Behavior: Behavior is cooperative.     + septal deviation (insignificant)  + Kiesselbach region lesion   + inferior nasal turbinate hypertrophy  + no signs of infection    AUDIOGRAM: The patient underwent an audiogram  09/11/2024.  Results: Hearing WNL bilaterally. 100% word rec. bilaterally. Tymps WNL. Reflexes as marked.  Right: Speech reception threshold is 15 dB with 100% word recognition. Tympanogram -- type.  Left: Speech reception threshold is 15 dB with 100% word recognition. Tympanogram -- type.    A/P  This pleasant patient has presented with hearing loss and a Kiesselbach lesion with nasal congestion that may be complicated by inferior nasal hypertrophy and septal deviation. Audiogram/Imaging was independently reviewed and discussed in detail with the patient, which demonstrated that her hearing is within expected normal limits. The symptoms that she is describing is issues with word recognition and processing issues. Methods of focusing her listening. We may refer her to a Speech Pathologist for further consultation for listen process strategies.     Physical examination shown a lesion in the Kiesselbach region and some congestion of the inferior turbinates and septal deviation. Options including medical and surgical ones were discussed. Topical nasal sprays including mild steroids, antihistaminic Azelastine were reviewed. She has been using nasal spray for her allergies. She has been advised to continue the Nasonex for at least 6-8 weeks. For her occasional nasal bleeding, she has been advised to use saline gel to keep the nasal passages moisturized. If her symptoms do not improve, we can consider cauterization with silver nitrate in the future at her next visit.     Follow up in clinic in 6-8 weeks or as needed.    Scribe/Staff:    Scribe Disclosure:   I, Estella Paiz, am serving as a scribe; to document services personally performed by Lelia Verma MD based on data collection and the provider's statements to me.     Provider Disclosure:  I agree with above History, Review of Systems, Physical exam and Plan.  I have reviewed the content of the documentation and have edited it as needed. I have personally  performed the services documented here and the documentation accurately represents those services and the decisions I have made.      Electronically signed by:  Leila Verma MD         Again, thank you for allowing me to participate in the care of your patient.        Sincerely,        Leila Verma MD

## 2024-09-11 NOTE — NURSING NOTE
Myriam Atkins's chief complaint for this visit includes:  Chief Complaint   Patient presents with    Consult     Hearing loss, ongoing for a couple years and unsure of laterality, but notices that she has to focus more to understand words when in a room full of background noise. Also has been misinterpreting words often for the past 2-3 months. No pain or drainage from the ears. Has history of TMJ and prior sinus issues, had sinus surgery many years ago. Been using Nasonex for the past week for Ragweed allergy, has been helping.     PCP: Musa Mariano    Referring Provider:  Musa Mariano MD  [facility] [place] [address]    LMP  (LMP Unknown)       Alex Mondragon, JONI  September 11, 2024

## 2024-10-04 ENCOUNTER — PATIENT OUTREACH (OUTPATIENT)
Dept: CARE COORDINATION | Facility: CLINIC | Age: 64
End: 2024-10-04
Payer: COMMERCIAL

## 2024-11-07 ENCOUNTER — MEDICAL CORRESPONDENCE (OUTPATIENT)
Dept: HEALTH INFORMATION MANAGEMENT | Facility: CLINIC | Age: 64
End: 2024-11-07
Payer: COMMERCIAL

## 2024-11-07 ENCOUNTER — TRANSFERRED RECORDS (OUTPATIENT)
Dept: HEALTH INFORMATION MANAGEMENT | Facility: CLINIC | Age: 64
End: 2024-11-07
Payer: COMMERCIAL

## 2024-11-08 ENCOUNTER — TRANSFERRED RECORDS (OUTPATIENT)
Dept: HEALTH INFORMATION MANAGEMENT | Facility: CLINIC | Age: 64
End: 2024-11-08
Payer: COMMERCIAL

## 2025-02-04 ENCOUNTER — MYC MEDICAL ADVICE (OUTPATIENT)
Dept: INTERNAL MEDICINE | Facility: CLINIC | Age: 65
End: 2025-02-04
Payer: COMMERCIAL

## 2025-02-04 DIAGNOSIS — J47.9 BRONCHIECTASIS WITHOUT ACUTE EXACERBATION (H): Primary | ICD-10-CM

## 2025-02-05 NOTE — TELEPHONE ENCOUNTER
Please clarify.  She is currently taking an Advair discus.  Fluticasone-salmeterol Diskus is the same as Advair discus.  Did she not care for the Advair HFA?  It looks like the Advair HFA is what is currently going to be covered.  Has she tried that before?  Does she want to try one of the other options instead?

## 2025-02-11 RX ORDER — FLUTICASONE PROPIONATE AND SALMETEROL XINAFOATE 115; 21 UG/1; UG/1
2 AEROSOL, METERED RESPIRATORY (INHALATION) 2 TIMES DAILY
Qty: 36 G | Refills: 3 | Status: SHIPPED | OUTPATIENT
Start: 2025-02-11

## 2025-03-03 DIAGNOSIS — I25.10 ASYMPTOMATIC ARTERIOSCLEROSIS OF CORONARY ARTERY: ICD-10-CM

## 2025-03-03 RX ORDER — ROSUVASTATIN CALCIUM 10 MG/1
10 TABLET, COATED ORAL DAILY
Qty: 90 TABLET | Refills: 2 | OUTPATIENT
Start: 2025-03-03

## 2025-06-11 DIAGNOSIS — I25.10 ASYMPTOMATIC ARTERIOSCLEROSIS OF CORONARY ARTERY: ICD-10-CM

## 2025-06-11 RX ORDER — ROSUVASTATIN CALCIUM 10 MG/1
10 TABLET, COATED ORAL DAILY
Qty: 90 TABLET | Refills: 0 | Status: SHIPPED | OUTPATIENT
Start: 2025-06-11

## 2025-06-18 ENCOUNTER — OFFICE VISIT (OUTPATIENT)
Dept: INTERNAL MEDICINE | Facility: CLINIC | Age: 65
End: 2025-06-18
Payer: MEDICARE

## 2025-06-18 VITALS
TEMPERATURE: 97.5 F | HEIGHT: 64 IN | WEIGHT: 200 LBS | SYSTOLIC BLOOD PRESSURE: 138 MMHG | RESPIRATION RATE: 11 BRPM | BODY MASS INDEX: 34.15 KG/M2 | DIASTOLIC BLOOD PRESSURE: 70 MMHG | OXYGEN SATURATION: 98 % | HEART RATE: 69 BPM

## 2025-06-18 DIAGNOSIS — J47.9 BRONCHIECTASIS WITHOUT ACUTE EXACERBATION (H): ICD-10-CM

## 2025-06-18 DIAGNOSIS — D36.9 MULTIPLE ADENOMATOUS POLYPS: ICD-10-CM

## 2025-06-18 DIAGNOSIS — M79.672 CHRONIC PAIN OF BOTH FEET: ICD-10-CM

## 2025-06-18 DIAGNOSIS — G89.29 CHRONIC PAIN OF BOTH FEET: ICD-10-CM

## 2025-06-18 DIAGNOSIS — I25.10 ASYMPTOMATIC ARTERIOSCLEROSIS OF CORONARY ARTERY: ICD-10-CM

## 2025-06-18 DIAGNOSIS — Z78.0 MENOPAUSE: ICD-10-CM

## 2025-06-18 DIAGNOSIS — Z00.00 ENCOUNTER FOR MEDICARE ANNUAL WELLNESS EXAM: Primary | ICD-10-CM

## 2025-06-18 DIAGNOSIS — J30.89 NON-SEASONAL ALLERGIC RHINITIS DUE TO OTHER ALLERGIC TRIGGER: ICD-10-CM

## 2025-06-18 DIAGNOSIS — R73.9 HYPERGLYCEMIA: ICD-10-CM

## 2025-06-18 DIAGNOSIS — M79.671 CHRONIC PAIN OF BOTH FEET: ICD-10-CM

## 2025-06-18 DIAGNOSIS — I10 ESSENTIAL HYPERTENSION: ICD-10-CM

## 2025-06-18 DIAGNOSIS — G47.33 OBSTRUCTIVE SLEEP APNEA: ICD-10-CM

## 2025-06-18 LAB
ALBUMIN SERPL BCG-MCNC: 4.4 G/DL (ref 3.5–5.2)
ALBUMIN UR-MCNC: NEGATIVE MG/DL
ALP SERPL-CCNC: 79 U/L (ref 40–150)
ALT SERPL W P-5'-P-CCNC: 19 U/L (ref 0–50)
ANION GAP SERPL CALCULATED.3IONS-SCNC: 9 MMOL/L (ref 7–15)
APPEARANCE UR: CLEAR
AST SERPL W P-5'-P-CCNC: 20 U/L (ref 0–45)
BILIRUB SERPL-MCNC: 0.5 MG/DL
BILIRUB UR QL STRIP: NEGATIVE
BUN SERPL-MCNC: 12.4 MG/DL (ref 8–23)
CALCIUM SERPL-MCNC: 10 MG/DL (ref 8.8–10.4)
CHLORIDE SERPL-SCNC: 106 MMOL/L (ref 98–107)
CHOLEST SERPL-MCNC: 135 MG/DL
COLOR UR AUTO: YELLOW
CREAT SERPL-MCNC: 0.76 MG/DL (ref 0.51–0.95)
EGFRCR SERPLBLD CKD-EPI 2021: 86 ML/MIN/1.73M2
ERYTHROCYTE [DISTWIDTH] IN BLOOD BY AUTOMATED COUNT: 13.3 % (ref 10–15)
EST. AVERAGE GLUCOSE BLD GHB EST-MCNC: 105 MG/DL
FASTING STATUS PATIENT QL REPORTED: YES
FASTING STATUS PATIENT QL REPORTED: YES
GLUCOSE SERPL-MCNC: 98 MG/DL (ref 70–99)
GLUCOSE UR STRIP-MCNC: NEGATIVE MG/DL
HBA1C MFR BLD: 5.3 % (ref 0–5.6)
HCO3 SERPL-SCNC: 27 MMOL/L (ref 22–29)
HCT VFR BLD AUTO: 42.8 % (ref 35–47)
HDLC SERPL-MCNC: 53 MG/DL
HGB BLD-MCNC: 14.2 G/DL (ref 11.7–15.7)
HGB UR QL STRIP: NEGATIVE
KETONES UR STRIP-MCNC: NEGATIVE MG/DL
LDLC SERPL CALC-MCNC: 58 MG/DL
LEUKOCYTE ESTERASE UR QL STRIP: NEGATIVE
MCH RBC QN AUTO: 28.7 PG (ref 26.5–33)
MCHC RBC AUTO-ENTMCNC: 33.2 G/DL (ref 31.5–36.5)
MCV RBC AUTO: 87 FL (ref 78–100)
NITRATE UR QL: NEGATIVE
NONHDLC SERPL-MCNC: 82 MG/DL
PH UR STRIP: 7 [PH] (ref 5–8)
PLATELET # BLD AUTO: 267 10E3/UL (ref 150–450)
POTASSIUM SERPL-SCNC: 3.9 MMOL/L (ref 3.4–5.3)
PROT SERPL-MCNC: 7.7 G/DL (ref 6.4–8.3)
RBC # BLD AUTO: 4.94 10E6/UL (ref 3.8–5.2)
SODIUM SERPL-SCNC: 142 MMOL/L (ref 135–145)
SP GR UR STRIP: 1.01 (ref 1–1.03)
TRIGL SERPL-MCNC: 120 MG/DL
UROBILINOGEN UR STRIP-ACNC: 0.2 E.U./DL
WBC # BLD AUTO: 6.8 10E3/UL (ref 4–11)

## 2025-06-18 PROCEDURE — 83036 HEMOGLOBIN GLYCOSYLATED A1C: CPT | Performed by: INTERNAL MEDICINE

## 2025-06-18 PROCEDURE — 99214 OFFICE O/P EST MOD 30 MIN: CPT | Mod: 25 | Performed by: INTERNAL MEDICINE

## 2025-06-18 PROCEDURE — 36415 COLL VENOUS BLD VENIPUNCTURE: CPT | Performed by: INTERNAL MEDICINE

## 2025-06-18 PROCEDURE — 80053 COMPREHEN METABOLIC PANEL: CPT | Performed by: INTERNAL MEDICINE

## 2025-06-18 PROCEDURE — G2211 COMPLEX E/M VISIT ADD ON: HCPCS | Performed by: INTERNAL MEDICINE

## 2025-06-18 PROCEDURE — 1126F AMNT PAIN NOTED NONE PRSNT: CPT | Performed by: INTERNAL MEDICINE

## 2025-06-18 PROCEDURE — 81003 URINALYSIS AUTO W/O SCOPE: CPT | Performed by: INTERNAL MEDICINE

## 2025-06-18 PROCEDURE — 80061 LIPID PANEL: CPT | Performed by: INTERNAL MEDICINE

## 2025-06-18 PROCEDURE — 85027 COMPLETE CBC AUTOMATED: CPT | Performed by: INTERNAL MEDICINE

## 2025-06-18 PROCEDURE — G0438 PPPS, INITIAL VISIT: HCPCS | Performed by: INTERNAL MEDICINE

## 2025-06-18 PROCEDURE — 3078F DIAST BP <80 MM HG: CPT | Performed by: INTERNAL MEDICINE

## 2025-06-18 PROCEDURE — 3075F SYST BP GE 130 - 139MM HG: CPT | Performed by: INTERNAL MEDICINE

## 2025-06-18 RX ORDER — ALBUTEROL SULFATE 90 UG/1
2 INHALANT RESPIRATORY (INHALATION) EVERY 6 HOURS
Qty: 18 G | Refills: 3 | Status: SHIPPED | OUTPATIENT
Start: 2025-06-18

## 2025-06-18 SDOH — HEALTH STABILITY: PHYSICAL HEALTH
ON AVERAGE, HOW MANY DAYS PER WEEK DO YOU ENGAGE IN MODERATE TO STRENUOUS EXERCISE (LIKE A BRISK WALK)?: PATIENT DECLINED

## 2025-06-18 ASSESSMENT — SOCIAL DETERMINANTS OF HEALTH (SDOH): HOW OFTEN DO YOU GET TOGETHER WITH FRIENDS OR RELATIVES?: THREE TIMES A WEEK

## 2025-06-18 ASSESSMENT — PAIN SCALES - GENERAL: PAINLEVEL_OUTOF10: NO PAIN (0)

## 2025-06-18 NOTE — PATIENT INSTRUCTIONS
Patient Education   Preventive Care Advice   This is general advice given by our system to help you stay healthy. However, your care team may have specific advice just for you. Please talk to your care team about your preventive care needs.  Nutrition  Eat 5 or more servings of fruits and vegetables each day.  Try wheat bread, brown rice and whole grain pasta (instead of white bread, rice, and pasta).  Get enough calcium and vitamin D. Check the label on foods and aim for 100% of the RDA (recommended daily allowance).  Lifestyle  Exercise at least 150 minutes each week  (30 minutes a day, 5 days a week).  Do muscle strengthening activities 2 days a week. These help control your weight and prevent disease.  No smoking.  Wear sunscreen to prevent skin cancer.  Have a dental exam and cleaning every 6 months.  Yearly exams  See your health care team every year to talk about:  Any changes in your health.  Any medicines your care team has prescribed.  Preventive care, family planning, and ways to prevent chronic diseases.  Shots (vaccines)   HPV shots (up to age 26), if you've never had them before.  Hepatitis B shots (up to age 59), if you've never had them before.  COVID-19 shot: Get this shot when it's due.  Flu shot: Get a flu shot every year.  Tetanus shot: Get a tetanus shot every 10 years.  Pneumococcal, hepatitis A, and RSV shots: Ask your care team if you need these based on your risk.  Shingles shot (for age 50 and up)  General health tests  Diabetes screening:  Starting at age 35, Get screened for diabetes at least every 3 years.  If you are younger than age 35, ask your care team if you should be screened for diabetes.  Cholesterol test: At age 39, start having a cholesterol test every 5 years, or more often if advised.  Bone density scan (DEXA): At age 50, ask your care team if you should have this scan for osteoporosis (brittle bones).  Hepatitis C: Get tested at least once in your life.  STIs (sexually  transmitted infections)  Before age 24: Ask your care team if you should be screened for STIs.  After age 24: Get screened for STIs if you're at risk. You are at risk for STIs (including HIV) if:  You are sexually active with more than one person.  You don't use condoms every time.  You or a partner was diagnosed with a sexually transmitted infection.  If you are at risk for HIV, ask about PrEP medicine to prevent HIV.  Get tested for HIV at least once in your life, whether you are at risk for HIV or not.  Cancer screening tests  Cervical cancer screening: If you have a cervix, begin getting regular cervical cancer screening tests starting at age 21.  Breast cancer scan (mammogram): If you've ever had breasts, begin having regular mammograms starting at age 40. This is a scan to check for breast cancer.  Colon cancer screening: It is important to start screening for colon cancer at age 45.  Have a colonoscopy test every 10 years (or more often if you're at risk) Or, ask your provider about stool tests like a FIT test every year or Cologuard test every 3 years.  To learn more about your testing options, visit:   .  For help making a decision, visit:   https://bit.ly/iu86519.  Prostate cancer screening test: If you have a prostate, ask your care team if a prostate cancer screening test (PSA) at age 55 is right for you.  Lung cancer screening: If you are a current or former smoker ages 50 to 80, ask your care team if ongoing lung cancer screenings are right for you.  For informational purposes only. Not to replace the advice of your health care provider. Copyright   2023 [facility]. All rights reserved. Clinically reviewed by the [facility]. [employer] 727457 - REV 01/24.  Eating Healthy Foods: Care Instructions  With every meal, you can make healthy food choices. Try to eat a variety of fruits, vegetables, whole grains, lean proteins, and low-fat dairy products. This can help  "you get the right balance of nutrients, including vitamins and minerals. Small changes add up over time. You can start by adding one healthy food to your meals each day.    Try to make half your plate fruits and vegetables, one-fourth whole grains, and one-fourth lean proteins. Try including dairy with your meals.   Eat more fruits and vegetables. Try to have them with most meals and snacks.   Foods for healthy eating        Fruits   These can be fresh, frozen, canned, or dried.  Try to choose whole fruit rather than fruit juice.  Eat a variety of colors.        Vegetables   These can be fresh, frozen, canned, or dried.  Beans, peas, and lentils count too.        Whole grains   Choose whole-grain breads, cereals, and noodles.  Try brown rice.        Lean proteins   These can include lean meat, poultry, fish, and eggs.  You can also have tofu, beans, peas, lentils, nuts, and seeds.        Dairy   Try milk, yogurt, and cheese.  Choose low-fat or fat-free when you can.  If you need to, use lactose-free milk or fortified plant-based milk products, such as soy milk.        Water   Drink water when you're thirsty.  Limit sugar-sweetened drinks, including soda, fruit drinks, and sports drinks.  Where can you learn more?  Go to https://www.Syntertainment.net/patiented  Enter T756 in the search box to learn more about \"Eating Healthy Foods: Care Instructions.\"  Current as of: October 7, 2024  Content Version: 14.5 2024-2025 [employer].   Care instructions adapted under license by your healthcare professional. If you have questions about a medical condition or this instruction, always ask your healthcare professional. [employer] disclaims any warranty or liability for your use of this information.    Preventing Falls: Care Instructions  Injuries and health problems such as trouble walking or poor eyesight can increase your risk of falling. So can some medicines. But there are things you can do to help " "prevent falls. You can exercise to get stronger. You can also arrange your home to make it safer.    Talk to your doctor about the medicines you take. Ask if any of them increase the risk of falls and whether they can be changed or stopped.   Try to exercise regularly. It can help improve your strength and balance. This can help lower your risk of falling.         Practice fall safety and prevention.   Wear low-heeled shoes that fit well and give your feet good support. Talk to your doctor if you have foot problems that make this hard.  Carry a cellphone or wear a medical alert device that you can use to call for help.  Use stepladders instead of chairs to reach high objects. Don't climb if you're at risk for falls. Ask for help, if needed.  Wear the correct eyeglasses, if you need them.        Make your home safer.   Remove rugs, cords, clutter, and furniture from walkways.  Keep your house well lit. Use night-lights in hallways and bathrooms.  Install and use sturdy handrails on stairways.  Wear nonskid footwear, even inside. Don't walk barefoot or in socks without shoes.        Be safe outside.   Use handrails, curb cuts, and ramps whenever possible.  Keep your hands free by using a shoulder bag or backpack.  Try to walk in well-lit areas. Watch out for uneven ground, changes in pavement, and debris.  Be careful in the winter. Walk on the grass or gravel when sidewalks are slippery. Use de-icer on steps and walkways. Add non-slip devices to shoes.    Put grab bars and nonskid mats in your shower or tub and near the toilet. Try to use a shower chair or bath bench when bathing.   Get into a tub or shower by putting in your weaker leg first. Get out with your strong side first. Have a phone or medical alert device in the bathroom with you.   Where can you learn more?  Go to https://www.[employer].net/patiented  Enter G117 in the search box to learn more about \"Preventing Falls: Care Instructions.\"  Current as of: " July 31, 2024  Content Version: 14.5    3968-0299 [employer].   Care instructions adapted under license by your healthcare professional. If you have questions about a medical condition or this instruction, always ask your healthcare professional. [employer] disclaims any warranty or liability for your use of this information.    Hearing Loss: Care Instructions  Overview     Hearing loss is a sudden or slow decrease in how well you hear. It can range from slight to profound. Permanent hearing loss can occur with aging. It also can happen when you are exposed long-term to loud noise. Examples include listening to loud music, riding motorcycles, or being around other loud machines.  Hearing loss can affect your work and home life. It can make you feel lonely or depressed. You may feel that you have lost your independence. But hearing aids and other devices can help you hear better and feel connected to others.  Follow-up care is a key part of your treatment and safety. Be sure to make and go to all appointments, and call your doctor if you are having problems. It's also a good idea to know your test results and keep a list of the medicines you take.  How can you care for yourself at home?  Avoid loud noises whenever possible. This helps keep your hearing from getting worse.  Always wear hearing protection around loud noises.  Wear a hearing aid as directed.  A professional can help you pick a hearing aid that will work best for you.  You can also get hearing aids over the counter for mild to moderate hearing loss.  Have hearing tests as your doctor suggests. They can show whether your hearing has changed. Your hearing aid may need to be adjusted.  Use other devices as needed. These may include:  Telephone amplifiers and hearing aids that can connect to a television, stereo, radio, or microphone.  Devices that use lights or vibrations. These alert you to the doorbell, a ringing telephone, or a  "baby monitor.  Television closed-captioning. This shows the words at the bottom of the screen. Most new TVs can do this.  TTY (text telephone). This lets you type messages back and forth on the telephone instead of talking or listening. These devices are also called TDD. When messages are typed on the keyboard, they are sent over the phone line to a receiving TTY. The message is shown on a monitor.  Use text messaging, social media, and email if it is hard for you to communicate by telephone.  Try to learn a listening technique called speechreading. It is not lipreading. You pay attention to people's gestures, expressions, posture, and tone of voice. These clues can help you understand what a person is saying. Face the person you are talking to, and have them face you. Make sure the lighting is good. You need to see the other person's face clearly.  Think about counseling if you need help to adjust to your hearing loss.  When should you call for help?  Watch closely for changes in your health, and be sure to contact your doctor if:    You think your hearing is getting worse.     You have new symptoms, such as dizziness or nausea.   Where can you learn more?  Go to https://www.[employer].net/patiented  Enter R798 in the search box to learn more about \"Hearing Loss: Care Instructions.\"  Current as of: October 27, 2024  Content Version: 14.5 2024-2025 [employer].   Care instructions adapted under license by your healthcare professional. If you have questions about a medical condition or this instruction, always ask your healthcare professional. [employer] disclaims any warranty or liability for your use of this information.       "

## 2025-06-18 NOTE — PROGRESS NOTES
Preventive Care Visit  [facility]  RICARDO KESSLER MD, Internal Medicine  Jun 18, 2025      Assessment & Plan     1. Encounter for Medicare annual wellness exam (Primary)  I have urged her to complete her healthcare directive and bring that in.  Will check lab work today.  Due for bone density.  She is due for colonoscopy in the fall.  I did recommend she consider doing the genetic testing for her family sake.  I also recommended that she try to find some activity for exercise that does not bother her feet such as biking or swimming.  I told her how important it is to stay active as she is getting older.    2. Menopause  She is due for bone density  - DX Bone Density; Future    3. Asymptomatic arteriosclerosis of coronary artery  Lipids today for monitoring.  No chest pain  - Lipid panel reflex to direct LDL Non-fasting; Future    4. Essential hypertension  Blood pressure well-controlled at this time.  Continue irbesartan  - UA Macroscopic with reflex to Microscopic and Culture - Lab Collect; Future  - Comprehensive metabolic panel (BMP + Alb, Alk Phos, ALT, AST, Total. Bili, TP); Future    5. Obstructive sleep apnea  Continue CPAP.  She is very compliant with use.  She needs new supplies apparently.  - CPAP/Comprehensive Sleep Order  - CBC with platelets; Future    6. Bronchiectasis without acute exacerbation (H)  I did renew her albuterol for use  - albuterol (VENTOLIN HFA) 108 (90 Base) MCG/ACT inhaler; Inhale 2 puffs into the lungs every 6 hours.  Dispense: 18 g; Refill: 3    7. Chronic pain of both feet  I recommended a visit with an orthopedic foot surgeon to discuss her chronic pain.  I told her I really do not want her to get immobile because of the ongoing discomfort.  At her age it is important that she stay active  - Orthopedic  Referral; Future    8. Non-seasonal allergic rhinitis due to other allergic trigger  Continue current regimen.    9. Multiple adenomatous polyps  I  "have recommended that she pursue the colonoscopy as planned in the fall and recommended she pursue the genetic testing previously recommended    10. Hyperglycemia    - Hemoglobin A1c; Future             BMI  Estimated body mass index is 34.61 kg/m  as calculated from the following:    Height as of this encounter: 1.619 m (5' 3.74\").    Weight as of this encounter: 90.7 kg (200 lb).       Counseling  Appropriate preventive services were addressed with this patient via screening, questionnaire, or discussion as appropriate for fall prevention, nutrition, physical activity, Tobacco-use cessation, social engagement, weight loss and cognition.  Checklist reviewing preventive services available has been given to the patient.  Reviewed patient's diet, addressing concerns and/or questions.   The patient was provided with written information regarding signs of hearing loss.             Nohemi Miguel is a 64 year old, presenting for the following:  Wellness Visit (She is travelling to [place] in August. She is wondering on having Dexa scan. She was not able to do it last year due to insurance information. She did a hearing test last fall. She has a spot on her left forearm. She needs CPAP supplies. Feet sore from arthritis.)        6/18/2025     8:59 AM   Additional Questions   Roomed by Janae CONROY  Myriam comes in today for her annual wellness.  Very pleasant woman with a history of very mild asymptomatic coronary disease.  She is on low-dose statin.  She has hypertension and sleep apnea.  She is very compliant with her CPAP.  She needs some supplies for that today.  She has bronchiectasis.  She remains on Symbicort and although she does not like it as much as the Advair is working for her.  She complains of chronic bilateral foot pain.  She did go to the [employer] and is using some new shoes and inserts and Remi still out on that but she is not very active because of bilateral foot pain.    She was " found to have multiple colon polyps this last fall on colonoscopy.  Over 10 I believe.  They recommended a 1 year follow-up and they also recommended a genetic counselor.  She did not pursue that.    She is looking forward to going on a [place] cruise in August.  2 weeks.         Advance Care Planning    Patient states has Health Care Directive and will send to [employer].        6/18/2025   General Health   How would you rate your overall physical health? Good   Feel stress (tense, anxious, or unable to sleep) Only a little   (!) STRESS CONCERN      6/18/2025   Nutrition   Diet: Regular (no restrictions)         6/18/2025   Exercise   Days per week of moderate/strenous exercise Patient declined         6/18/2025   Social Factors   Frequency of gathering with friends or relatives Three times a week   Worry food won't last until get money to buy more No   Food not last or not have enough money for food? No   Do you have housing? (Housing is defined as stable permanent housing and does not include staying outside in a car, in a tent, in an abandoned building, in an overnight shelter, or couch-surfing.) Yes   Are you worried about losing your housing? No   Lack of transportation? No   Unable to get utilities (heat,electricity)? No         6/18/2025   Fall Risk   Fallen 2 or more times in the past year? No    No   Trouble with walking or balance? Yes    Yes   Reason Gait Speed Test Not Completed Patient declines   Reason for decline She has sore feet due to arthritis and just takes it slow.       Multiple values from one day are sorted in reverse-chronological order          6/18/2025   Activities of Daily Living- Home Safety   Needs help with the following daily activites None of the above   Safety concerns in the home None of the above         6/18/2025   Dental   Dentist two times every year? Yes         6/18/2025   Hearing Screening   Hearing concerns? (!) IT'S HARD TO FOLLOW A CONVERSATION IN A NOISY  RESTAURANT OR CROWDED ROOM.   Would you like a referral for hearing testing? No         6/18/2025   Driving Risk Screening   Patient/family members have concerns about driving No         6/18/2025   General Alertness/Fatigue Screening   Have you been more tired than usual lately? No         6/18/2025   Urinary Incontinence Screening   Bothered by leaking urine in past 6 months No         Today's PHQ-2 Score:       6/18/2025     9:00 AM   PHQ-2 ( 1999 Pfizer)   Q1: Little interest or pleasure in doing things 0   Q2: Feeling down, depressed or hopeless 0   PHQ-2 Score 0    Q1: Little interest or pleasure in doing things Not at all   Q2: Feeling down, depressed or hopeless Not at all   PHQ-2 Score 0       Patient-reported           6/18/2025   Substance Use   Alcohol more than 3/day or more than 7/wk No   Do you have a current opioid prescription? No   How severe/bad is pain from 1 to 10? 5/10   Do you use any other substances recreationally? No     Social History     Tobacco Use    Smoking status: Never     Passive exposure: Never    Smokeless tobacco: Never   Vaping Use    Vaping status: Never Used   Substance Use Topics    Alcohol use: Yes     Alcohol/week: 7.0 standard drinks of alcohol           6/15/2023   LAST FHS-7 RESULTS   1st degree relative breast or ovarian cancer No    Any relative bilateral breast cancer No    Any male have breast cancer No    Any ONE woman have BOTH breast AND ovarian cancer Yes    Any woman with breast cancer before 50yrs No    2 or more relatives with breast AND/OR ovarian cancer Yes    2 or more relatives with breast AND/OR bowel cancer Yes        Proxy-reported                      2/1/2023    12:11 PM 4/9/2019    12:00 AM   PAP / HPV   PAP Negative for Intraepithelial Lesion or Malignancy (NILM)     HPV_EXT - HISTORICAL  See Scanned Report      ASCVD Risk   The 10-year ASCVD risk score (Malu LINN, et al., 2019) is: 9%    Values used to calculate the score:      Age: 64  "years      Sex: Female      Is Non- : No      Diabetic: No      Tobacco smoker: No      Systolic Blood Pressure: 145 mmHg      Is BP treated: Yes      HDL Cholesterol: 61 mg/dL      Total Cholesterol: 154 mg/dL            Reviewed and updated as needed this visit by Provider                      Current providers sharing in care for this patient include:  Patient Care Team:  Musa Mariano MD as PCP - General (Internal Medicine)  Musa Mariano MD as Assigned PCP  Fernie Hart AuD (Audiology)  Leila Verma MD as MD (Otolaryngology)  Leila Verma MD as Assigned Surgical Provider    The following health maintenance items are reviewed in Epic and correct as of today:  Health Maintenance   Topic Date Due    BMP  06/17/2025    LIPID  06/17/2025    ANNUAL REVIEW OF HM ORDERS  06/17/2025    MEDICARE ANNUAL WELLNESS VISIT  06/17/2025    COLORECTAL CANCER SCREENING  11/07/2025    COVID-19 VACCINE (8 - 2024-25 season) 11/19/2025    FALL RISK ASSESSMENT  06/18/2026    MAMMO SCREENING  09/03/2026    DIABETES SCREENING  06/17/2027    DTAP/TDAP/TD VACCINE (3 - Td or Tdap) 02/06/2028    ADVANCE CARE PLANNING  06/17/2029    DEXA  04/09/2034    HEPATITIS C SCREENING  Completed    PHQ-2 (once per calendar year)  Completed    INFLUENZA VACCINE  Completed    PNEUMOCOCCAL VACCINE 50+ YEARS  Completed    ZOSTER VACCINE  Completed    RSV VACCINE  Completed    HPV VACCINE  Aged Out    MENINGITIS VACCINE  Aged Out    PAP  Discontinued            Objective    Exam  BP (!) 145/79 (BP Location: Left arm, Patient Position: Sitting, Cuff Size: Adult Large)   Pulse 69   Temp 97.5  F (36.4  C) (Temporal)   Resp 11   Ht 1.619 m (5' 3.74\")   Wt 90.7 kg (200 lb)   LMP  (LMP Unknown)   SpO2 98%   BMI 34.61 kg/m     Estimated body mass index is 34.61 kg/m  as calculated from the following:    Height as of this encounter: 1.619 m (5' 3.74\").    Weight as of this encounter: 90.7 kg (200 " lb).    Physical Exam  GENERAL: alert and no distress  EYES: Eyes grossly normal to inspection, PERRL and conjunctivae and sclerae normal  NECK: no adenopathy, no asymmetry, masses, or scars  RESP: lungs clear to auscultation - no rales, rhonchi or wheezes  CV: regular rate and rhythm, normal S1 S2, no S3 or S4, no murmur, click or rub, no peripheral edema  ABDOMEN: soft, nontender, no hepatosplenomegaly, no masses and bowel sounds normal  MS: no gross musculoskeletal defects noted, no edema  SKIN: no suspicious lesions or rashes  PSYCH: mentation appears normal, affect normal/bright  Gait and balance assessed per Gait Speed Test.  Result as above.        6/18/2025   Mini Cog   Clock Draw Score 2 Normal   3 Item Recall 3 objects recalled   Mini Cog Total Score 5             6/17/2024   Vision Screen   Vision Screen Results Pass       Signed Electronically by: RICARDO KESSLER MD  DME (Durable Medical Equipment) Orders and Documentation  No orders of the defined types were placed in this encounter.     The patient was assessed and it was determined the patient is in need of the following listed DME Supplies/Equipment. Please complete supporting documentation below to demonstrate medical necessity.

## 2025-06-19 ENCOUNTER — RESULTS FOLLOW-UP (OUTPATIENT)
Dept: INTERNAL MEDICINE | Facility: CLINIC | Age: 65
End: 2025-06-19

## 2025-06-19 ENCOUNTER — PATIENT OUTREACH (OUTPATIENT)
Dept: CARE COORDINATION | Facility: CLINIC | Age: 65
End: 2025-06-19
Payer: MEDICARE

## 2025-06-23 ENCOUNTER — PATIENT OUTREACH (OUTPATIENT)
Dept: CARE COORDINATION | Facility: CLINIC | Age: 65
End: 2025-06-23
Payer: MEDICARE

## 2025-06-30 ENCOUNTER — TELEPHONE (OUTPATIENT)
Dept: INTERNAL MEDICINE | Facility: CLINIC | Age: 65
End: 2025-06-30
Payer: MEDICARE

## 2025-06-30 NOTE — TELEPHONE ENCOUNTER
June 30, 2025    Durable medical equipment/medical supply order was received via fax for Dr. Mariano.  Patient label was attached to paperwork and placed in provider's inbox to be signed.    Marie Underwood

## 2025-09-04 DIAGNOSIS — I25.10 ASYMPTOMATIC ARTERIOSCLEROSIS OF CORONARY ARTERY: ICD-10-CM

## 2025-09-04 DIAGNOSIS — J30.89 NON-SEASONAL ALLERGIC RHINITIS DUE TO OTHER ALLERGIC TRIGGER: ICD-10-CM

## 2025-09-04 RX ORDER — ROSUVASTATIN CALCIUM 10 MG/1
10 TABLET, COATED ORAL DAILY
Qty: 90 TABLET | Refills: 2 | Status: SHIPPED | OUTPATIENT
Start: 2025-09-04

## 2025-09-04 RX ORDER — MOMETASONE FUROATE MONOHYDRATE 50 UG/1
2 SPRAY, METERED NASAL DAILY
Qty: 17 G | Refills: 2 | Status: SHIPPED | OUTPATIENT
Start: 2025-09-04